# Patient Record
Sex: FEMALE | Race: WHITE | NOT HISPANIC OR LATINO | URBAN - METROPOLITAN AREA
[De-identification: names, ages, dates, MRNs, and addresses within clinical notes are randomized per-mention and may not be internally consistent; named-entity substitution may affect disease eponyms.]

---

## 2017-01-05 ENCOUNTER — ON CAMPUS - OUTPATIENT (AMBULATORY)
Dept: URBAN - METROPOLITAN AREA HOSPITAL 2 | Facility: HOSPITAL | Age: 62
End: 2017-01-05

## 2017-01-05 VITALS
DIASTOLIC BLOOD PRESSURE: 67 MMHG | HEART RATE: 104 BPM | DIASTOLIC BLOOD PRESSURE: 62 MMHG | HEART RATE: 100 BPM | HEART RATE: 109 BPM | OXYGEN SATURATION: 94 % | DIASTOLIC BLOOD PRESSURE: 65 MMHG | OXYGEN SATURATION: 96 % | WEIGHT: 189 LBS | RESPIRATION RATE: 17 BRPM | DIASTOLIC BLOOD PRESSURE: 90 MMHG | SYSTOLIC BLOOD PRESSURE: 146 MMHG | SYSTOLIC BLOOD PRESSURE: 121 MMHG | HEART RATE: 111 BPM | SYSTOLIC BLOOD PRESSURE: 135 MMHG | RESPIRATION RATE: 18 BRPM | HEART RATE: 102 BPM | SYSTOLIC BLOOD PRESSURE: 103 MMHG | HEIGHT: 63 IN | DIASTOLIC BLOOD PRESSURE: 74 MMHG | OXYGEN SATURATION: 93 % | SYSTOLIC BLOOD PRESSURE: 118 MMHG | TEMPERATURE: 96.9 F | DIASTOLIC BLOOD PRESSURE: 64 MMHG

## 2017-01-05 DIAGNOSIS — T18.2XXA FOREIGN BODY IN STOMACH, INITIAL ENCOUNTER: ICD-10-CM

## 2017-01-05 DIAGNOSIS — K92.1 MELENA: ICD-10-CM

## 2017-01-05 DIAGNOSIS — K44.9 DIAPHRAGMATIC HERNIA WITHOUT OBSTRUCTION OR GANGRENE: ICD-10-CM

## 2017-01-05 PROCEDURE — 43235 EGD DIAGNOSTIC BRUSH WASH: CPT

## 2017-01-05 RX ADMIN — PROPOFOL: 10 INJECTION, EMULSION INTRAVENOUS at 08:57

## 2017-03-01 ENCOUNTER — HOSPITAL ENCOUNTER (OUTPATIENT)
Dept: PAIN MEDICINE | Facility: HOSPITAL | Age: 62
Discharge: HOME | End: 2017-03-01
Attending: ANESTHESIOLOGY | Admitting: ANESTHESIOLOGY

## 2017-03-01 LAB
AMPHETAMINES UR QL SCN: NEGATIVE
BARBITURATES UR QL SCN: NEGATIVE
BENZODIAZ UR QL SCN: POSITIVE
BZE UR QL SCN: NEGATIVE
CREAT 24H UR-MCNC: ABNORMAL MG/DL
METHADONE UR QL SCN: NEGATIVE
OPIATES TESTED UR SCN: POSITIVE
PCP UR QL: NEGATIVE
THC SERPLBLD CFM-MCNC: NEGATIVE NG/ML

## 2017-03-02 ENCOUNTER — HOSPITAL ENCOUNTER (OUTPATIENT)
Dept: OTHER | Facility: HOSPITAL | Age: 62
Discharge: HOME | End: 2017-03-02
Attending: ANESTHESIOLOGY | Admitting: ANESTHESIOLOGY

## 2017-03-24 ENCOUNTER — HOSPITAL ENCOUNTER (OUTPATIENT)
Dept: PAIN MEDICINE | Facility: HOSPITAL | Age: 62
Discharge: HOME | End: 2017-03-24
Attending: ANESTHESIOLOGY | Admitting: ANESTHESIOLOGY

## 2017-04-03 ENCOUNTER — HOSPITAL ENCOUNTER (OUTPATIENT)
Dept: CARDIOLOGY | Facility: HOSPITAL | Age: 62
Discharge: HOME | End: 2017-04-03
Attending: NURSE PRACTITIONER | Admitting: NURSE PRACTITIONER

## 2017-05-10 ENCOUNTER — HOSPITAL ENCOUNTER (OUTPATIENT)
Dept: LAB | Facility: HOSPITAL | Age: 62
Setting detail: SPECIMEN
Discharge: HOME | End: 2017-05-10
Attending: NURSE PRACTITIONER | Admitting: NURSE PRACTITIONER

## 2017-05-10 LAB
ALBUMIN SERPL-MCNC: 3.7 G/DL (ref 3.5–4.8)
ALBUMIN/GLOB SERPL: 1.1 {RATIO} (ref 1–1.7)
ALP SERPL-CCNC: 94 IU/L (ref 32–91)
ALT SERPL-CCNC: 22 IU/L (ref 14–54)
ANION GAP SERPL CALC-SCNC: 14.9 MMOL/L (ref 10–20)
AST SERPL-CCNC: 22 IU/L (ref 15–41)
BILIRUB SERPL-MCNC: 0.3 MG/DL (ref 0.3–1.2)
BUN SERPL-MCNC: 30 MG/DL (ref 8–20)
BUN/CREAT SERPL: 25 (ref 5.4–26.2)
CALCIUM SERPL-MCNC: 9.7 MG/DL (ref 8.9–10.3)
CHLORIDE SERPL-SCNC: 99 MMOL/L (ref 101–111)
CHOLEST SERPL-MCNC: 260 MG/DL
CHOLEST/HDLC SERPL: 7.1 {RATIO}
CONV CO2: 31 MMOL/L (ref 22–32)
CONV LDL CHOLESTEROL DIRECT: 117 MG/DL (ref 0–100)
CONV TOTAL PROTEIN: 7 G/DL (ref 6.1–7.9)
CREAT UR-MCNC: 1.2 MG/DL (ref 0.4–1)
GLOBULIN UR ELPH-MCNC: 3.3 G/DL (ref 2.5–3.8)
GLUCOSE SERPL-MCNC: 97 MG/DL (ref 65–99)
HDLC SERPL-MCNC: 37 MG/DL
LDLC/HDLC SERPL: 3.2 {RATIO}
LIPID INTERPRETATION: ABNORMAL
POTASSIUM SERPL-SCNC: 4.9 MMOL/L (ref 3.6–5.1)
SODIUM SERPL-SCNC: 140 MMOL/L (ref 136–144)
TRIGL SERPL-MCNC: 516 MG/DL
VLDLC SERPL CALC-MCNC: 106.4 MG/DL

## 2017-05-18 ENCOUNTER — OFFICE (AMBULATORY)
Dept: URBAN - METROPOLITAN AREA CLINIC 64 | Facility: CLINIC | Age: 62
End: 2017-05-18

## 2017-05-18 VITALS
SYSTOLIC BLOOD PRESSURE: 110 MMHG | HEART RATE: 99 BPM | WEIGHT: 174 LBS | DIASTOLIC BLOOD PRESSURE: 67 MMHG | HEIGHT: 63 IN

## 2017-05-18 DIAGNOSIS — R13.10 DYSPHAGIA, UNSPECIFIED: ICD-10-CM

## 2017-05-18 DIAGNOSIS — R63.4 ABNORMAL WEIGHT LOSS: ICD-10-CM

## 2017-05-18 DIAGNOSIS — R11.0 NAUSEA: ICD-10-CM

## 2017-05-18 DIAGNOSIS — R10.11 RIGHT UPPER QUADRANT PAIN: ICD-10-CM

## 2017-05-18 DIAGNOSIS — K92.1 MELENA: ICD-10-CM

## 2017-05-18 LAB
AMYLASE, SERUM: 45 U/L (ref 31–124)
CBC WITH DIFFERENTIAL/PLATELET: BASO (ABSOLUTE): 0.1 X10E3/UL (ref 0–0.2)
CBC WITH DIFFERENTIAL/PLATELET: BASOS: 1 %
CBC WITH DIFFERENTIAL/PLATELET: EOS (ABSOLUTE): 0.4 X10E3/UL (ref 0–0.4)
CBC WITH DIFFERENTIAL/PLATELET: EOS: 4 %
CBC WITH DIFFERENTIAL/PLATELET: HEMATOCRIT: 41 % (ref 34–46.6)
CBC WITH DIFFERENTIAL/PLATELET: HEMATOLOGY COMMENTS: (no result)
CBC WITH DIFFERENTIAL/PLATELET: HEMOGLOBIN: 13.3 G/DL (ref 11.1–15.9)
CBC WITH DIFFERENTIAL/PLATELET: IMMATURE CELLS: (no result)
CBC WITH DIFFERENTIAL/PLATELET: IMMATURE GRANS (ABS): 0 X10E3/UL (ref 0–0.1)
CBC WITH DIFFERENTIAL/PLATELET: IMMATURE GRANULOCYTES: 0 %
CBC WITH DIFFERENTIAL/PLATELET: LYMPHS (ABSOLUTE): 3.1 X10E3/UL (ref 0.7–3.1)
CBC WITH DIFFERENTIAL/PLATELET: LYMPHS: 33 %
CBC WITH DIFFERENTIAL/PLATELET: MCH: 29.7 PG (ref 26.6–33)
CBC WITH DIFFERENTIAL/PLATELET: MCHC: 32.4 G/DL (ref 31.5–35.7)
CBC WITH DIFFERENTIAL/PLATELET: MCV: 92 FL (ref 79–97)
CBC WITH DIFFERENTIAL/PLATELET: MONOCYTES(ABSOLUTE): 0.8 X10E3/UL (ref 0.1–0.9)
CBC WITH DIFFERENTIAL/PLATELET: MONOCYTES: 8 %
CBC WITH DIFFERENTIAL/PLATELET: NEUTROPHILS (ABSOLUTE): 5.2 X10E3/UL (ref 1.4–7)
CBC WITH DIFFERENTIAL/PLATELET: NEUTROPHILS: 54 %
CBC WITH DIFFERENTIAL/PLATELET: NRBC: (no result)
CBC WITH DIFFERENTIAL/PLATELET: PLATELETS: 279 X10E3/UL (ref 150–379)
CBC WITH DIFFERENTIAL/PLATELET: RBC: 4.48 X10E6/UL (ref 3.77–5.28)
CBC WITH DIFFERENTIAL/PLATELET: RDW: 13.5 % (ref 12.3–15.4)
CBC WITH DIFFERENTIAL/PLATELET: WBC: 9.6 X10E3/UL (ref 3.4–10.8)
COMP. METABOLIC PANEL (14): A/G RATIO: 1.5 (ref 1.2–2.2)
COMP. METABOLIC PANEL (14): ALBUMIN, SERUM: 4.3 G/DL (ref 3.6–4.8)
COMP. METABOLIC PANEL (14): ALKALINE PHOSPHATASE, S: 124 IU/L — HIGH (ref 39–117)
COMP. METABOLIC PANEL (14): ALT (SGPT): 16 IU/L (ref 0–32)
COMP. METABOLIC PANEL (14): AST (SGOT): 15 IU/L (ref 0–40)
COMP. METABOLIC PANEL (14): BILIRUBIN, TOTAL: 0.2 MG/DL (ref 0–1.2)
COMP. METABOLIC PANEL (14): BUN/CREATININE RATIO: 25 (ref 12–28)
COMP. METABOLIC PANEL (14): BUN: 29 MG/DL — HIGH (ref 8–27)
COMP. METABOLIC PANEL (14): CALCIUM, SERUM: 10.5 MG/DL — HIGH (ref 8.7–10.3)
COMP. METABOLIC PANEL (14): CARBON DIOXIDE, TOTAL: 26 MMOL/L (ref 18–29)
COMP. METABOLIC PANEL (14): CHLORIDE, SERUM: 97 MMOL/L (ref 96–106)
COMP. METABOLIC PANEL (14): CREATININE, SERUM: 1.17 MG/DL — HIGH (ref 0.57–1)
COMP. METABOLIC PANEL (14): EGFR IF AFRICN AM: 59 ML/MIN/1.73 — LOW (ref 59–?)
COMP. METABOLIC PANEL (14): EGFR IF NONAFRICN AM: 51 ML/MIN/1.73 — LOW (ref 59–?)
COMP. METABOLIC PANEL (14): GLOBULIN, TOTAL: 2.9 G/DL (ref 1.5–4.5)
COMP. METABOLIC PANEL (14): GLUCOSE, SERUM: 205 MG/DL — HIGH (ref 65–99)
COMP. METABOLIC PANEL (14): POTASSIUM, SERUM: 5 MMOL/L (ref 3.5–5.2)
COMP. METABOLIC PANEL (14): PROTEIN, TOTAL, SERUM: 7.2 G/DL (ref 6–8.5)
COMP. METABOLIC PANEL (14): SODIUM, SERUM: 141 MMOL/L (ref 134–144)
LIPASE, SERUM: 36 U/L (ref 0–59)

## 2017-05-18 PROCEDURE — 99214 OFFICE O/P EST MOD 30 MIN: CPT | Performed by: NURSE PRACTITIONER

## 2017-05-19 ENCOUNTER — HOSPITAL ENCOUNTER (OUTPATIENT)
Dept: PAIN MEDICINE | Facility: HOSPITAL | Age: 62
Discharge: HOME | End: 2017-05-19
Attending: ANESTHESIOLOGY | Admitting: ANESTHESIOLOGY

## 2017-05-25 ENCOUNTER — ON CAMPUS - OUTPATIENT (AMBULATORY)
Dept: URBAN - METROPOLITAN AREA HOSPITAL 2 | Facility: HOSPITAL | Age: 62
End: 2017-05-25

## 2017-05-25 VITALS
OXYGEN SATURATION: 95 % | SYSTOLIC BLOOD PRESSURE: 139 MMHG | DIASTOLIC BLOOD PRESSURE: 72 MMHG | DIASTOLIC BLOOD PRESSURE: 74 MMHG | DIASTOLIC BLOOD PRESSURE: 71 MMHG | RESPIRATION RATE: 15 BRPM | WEIGHT: 173 LBS | HEART RATE: 85 BPM | HEART RATE: 102 BPM | SYSTOLIC BLOOD PRESSURE: 104 MMHG | HEART RATE: 88 BPM | TEMPERATURE: 98.3 F | RESPIRATION RATE: 16 BRPM | OXYGEN SATURATION: 100 % | HEIGHT: 63 IN | HEART RATE: 93 BPM | SYSTOLIC BLOOD PRESSURE: 118 MMHG | HEART RATE: 87 BPM | DIASTOLIC BLOOD PRESSURE: 76 MMHG | SYSTOLIC BLOOD PRESSURE: 135 MMHG

## 2017-05-25 DIAGNOSIS — R11.0 NAUSEA: ICD-10-CM

## 2017-05-25 DIAGNOSIS — R10.11 RIGHT UPPER QUADRANT PAIN: ICD-10-CM

## 2017-05-25 DIAGNOSIS — T18.128A FOOD IN ESOPHAGUS CAUSING OTHER INJURY, INITIAL ENCOUNTER: ICD-10-CM

## 2017-05-25 DIAGNOSIS — K44.9 DIAPHRAGMATIC HERNIA WITHOUT OBSTRUCTION OR GANGRENE: ICD-10-CM

## 2017-05-25 PROCEDURE — 43235 EGD DIAGNOSTIC BRUSH WASH: CPT

## 2017-05-25 RX ADMIN — PROPOFOL: 10 INJECTION, EMULSION INTRAVENOUS at 10:25

## 2017-06-02 ENCOUNTER — HOSPITAL ENCOUNTER (OUTPATIENT)
Dept: PAIN MEDICINE | Facility: HOSPITAL | Age: 62
Discharge: HOME | End: 2017-06-02
Attending: ANESTHESIOLOGY | Admitting: ANESTHESIOLOGY

## 2017-06-08 ENCOUNTER — OFFICE (AMBULATORY)
Dept: URBAN - METROPOLITAN AREA CLINIC 64 | Facility: CLINIC | Age: 62
End: 2017-06-08

## 2017-06-08 VITALS
SYSTOLIC BLOOD PRESSURE: 149 MMHG | DIASTOLIC BLOOD PRESSURE: 87 MMHG | HEIGHT: 63 IN | HEART RATE: 84 BPM | WEIGHT: 187 LBS

## 2017-06-08 DIAGNOSIS — K59.00 CONSTIPATION, UNSPECIFIED: ICD-10-CM

## 2017-06-08 PROCEDURE — 99214 OFFICE O/P EST MOD 30 MIN: CPT

## 2017-06-08 RX ORDER — LUBIPROSTONE 24 UG/1
CAPSULE, GELATIN COATED ORAL
Qty: 60 | Refills: 8 | Status: COMPLETED
End: 2017-06-08

## 2017-06-08 RX ORDER — PLECANATIDE 3 MG/1
3 TABLET ORAL
Qty: 30 | Refills: 11 | Status: COMPLETED
Start: 2017-06-08 | End: 2017-08-02

## 2017-06-23 ENCOUNTER — HOSPITAL ENCOUNTER (OUTPATIENT)
Dept: LAB | Facility: HOSPITAL | Age: 62
Discharge: HOME | End: 2017-06-23
Attending: INTERNAL MEDICINE | Admitting: INTERNAL MEDICINE

## 2017-06-23 LAB
BASOPHILS # BLD AUTO: 0.1 10*3/UL (ref 0–0.2)
BASOPHILS NFR BLD AUTO: 1 % (ref 0–2)
DIFFERENTIAL METHOD BLD: (no result)
EOSINOPHIL # BLD AUTO: 0.2 10*3/UL (ref 0–0.3)
EOSINOPHIL # BLD AUTO: 2 % (ref 0–3)
ERYTHROCYTE [DISTWIDTH] IN BLOOD BY AUTOMATED COUNT: 13.5 % (ref 11.5–14.5)
HCT VFR BLD AUTO: 38.8 % (ref 35–49)
HGB BLD-MCNC: 12.7 G/DL (ref 12–15)
LYMPHOCYTES # BLD AUTO: 3.8 10*3/UL (ref 0.8–4.8)
LYMPHOCYTES NFR BLD AUTO: 29 % (ref 18–42)
MCH RBC QN AUTO: 29.3 PG (ref 26–32)
MCHC RBC AUTO-ENTMCNC: 32.6 G/DL (ref 32–36)
MCV RBC AUTO: 89.8 FL (ref 80–94)
MONOCYTES # BLD AUTO: 0.8 10*3/UL (ref 0.1–1.3)
MONOCYTES NFR BLD AUTO: 6 % (ref 2–11)
NEUTROPHILS # BLD AUTO: 8.3 10*3/UL (ref 2.3–8.6)
NEUTROPHILS NFR BLD AUTO: 62 % (ref 50–75)
NRBC BLD AUTO-RTO: 0 /100{WBCS}
NRBC/RBC NFR BLD MANUAL: 0 10*3/UL
PLATELET # BLD AUTO: 267 10*3/UL (ref 150–450)
PMV BLD AUTO: 9.9 FL (ref 7.4–10.4)
RBC # BLD AUTO: 4.32 10*6/UL (ref 4–5.4)
WBC # BLD AUTO: 13.3 10*3/UL (ref 4.5–11.5)

## 2017-06-24 ENCOUNTER — HOSPITAL ENCOUNTER (OUTPATIENT)
Dept: LAB | Facility: HOSPITAL | Age: 62
Setting detail: SPECIMEN
Discharge: HOME | End: 2017-06-24
Attending: INTERNAL MEDICINE | Admitting: INTERNAL MEDICINE

## 2017-06-24 LAB
HEMOCCULT STL QL IA: NEGATIVE

## 2017-07-05 ENCOUNTER — HOSPITAL ENCOUNTER (OUTPATIENT)
Dept: PAIN MEDICINE | Facility: HOSPITAL | Age: 62
Discharge: HOME | End: 2017-07-05
Attending: ANESTHESIOLOGY | Admitting: ANESTHESIOLOGY

## 2017-07-05 LAB
AMPHETAMINES UR QL SCN: NEGATIVE
BZE UR QL SCN: NEGATIVE
CREAT 24H UR-MCNC: NORMAL MG/DL
METHADONE UR QL SCN: NEGATIVE
OPIATE CONFIRMATION URINE: NORMAL
THC SERPLBLD CFM-MCNC: NEGATIVE NG/ML

## 2017-08-02 ENCOUNTER — OFFICE (AMBULATORY)
Dept: URBAN - METROPOLITAN AREA CLINIC 64 | Facility: CLINIC | Age: 62
End: 2017-08-02

## 2017-08-02 VITALS
DIASTOLIC BLOOD PRESSURE: 69 MMHG | HEIGHT: 63 IN | WEIGHT: 193 LBS | SYSTOLIC BLOOD PRESSURE: 115 MMHG | HEART RATE: 92 BPM

## 2017-08-02 DIAGNOSIS — R10.11 RIGHT UPPER QUADRANT PAIN: ICD-10-CM

## 2017-08-02 DIAGNOSIS — R11.0 NAUSEA: ICD-10-CM

## 2017-08-02 DIAGNOSIS — K59.00 CONSTIPATION, UNSPECIFIED: ICD-10-CM

## 2017-08-02 LAB
CBC WITH DIFFERENTIAL/PLATELET: BASO (ABSOLUTE): 0.1 X10E3/UL (ref 0–0.2)
CBC WITH DIFFERENTIAL/PLATELET: BASOS: 1 %
CBC WITH DIFFERENTIAL/PLATELET: EOS (ABSOLUTE): 0.3 X10E3/UL (ref 0–0.4)
CBC WITH DIFFERENTIAL/PLATELET: EOS: 2 %
CBC WITH DIFFERENTIAL/PLATELET: HEMATOCRIT: 36.3 % (ref 34–46.6)
CBC WITH DIFFERENTIAL/PLATELET: HEMATOLOGY COMMENTS: (no result)
CBC WITH DIFFERENTIAL/PLATELET: HEMOGLOBIN: 12.1 G/DL (ref 11.1–15.9)
CBC WITH DIFFERENTIAL/PLATELET: IMMATURE CELLS: (no result)
CBC WITH DIFFERENTIAL/PLATELET: IMMATURE GRANS (ABS): 0.1 X10E3/UL (ref 0–0.1)
CBC WITH DIFFERENTIAL/PLATELET: IMMATURE GRANULOCYTES: 1 %
CBC WITH DIFFERENTIAL/PLATELET: LYMPHS (ABSOLUTE): 3.5 X10E3/UL — HIGH (ref 0.7–3.1)
CBC WITH DIFFERENTIAL/PLATELET: LYMPHS: 31 %
CBC WITH DIFFERENTIAL/PLATELET: MCH: 29.4 PG (ref 26.6–33)
CBC WITH DIFFERENTIAL/PLATELET: MCHC: 33.3 G/DL (ref 31.5–35.7)
CBC WITH DIFFERENTIAL/PLATELET: MCV: 88 FL (ref 79–97)
CBC WITH DIFFERENTIAL/PLATELET: MONOCYTES(ABSOLUTE): 0.9 X10E3/UL (ref 0.1–0.9)
CBC WITH DIFFERENTIAL/PLATELET: MONOCYTES: 8 %
CBC WITH DIFFERENTIAL/PLATELET: NEUTROPHILS (ABSOLUTE): 6.4 X10E3/UL (ref 1.4–7)
CBC WITH DIFFERENTIAL/PLATELET: NEUTROPHILS: 57 %
CBC WITH DIFFERENTIAL/PLATELET: NRBC: (no result)
CBC WITH DIFFERENTIAL/PLATELET: PLATELETS: 280 X10E3/UL (ref 150–379)
CBC WITH DIFFERENTIAL/PLATELET: RBC: 4.12 X10E6/UL (ref 3.77–5.28)
CBC WITH DIFFERENTIAL/PLATELET: RDW: 13.8 % (ref 12.3–15.4)
CBC WITH DIFFERENTIAL/PLATELET: WBC: 11.1 X10E3/UL — HIGH (ref 3.4–10.8)

## 2017-08-02 PROCEDURE — 99213 OFFICE O/P EST LOW 20 MIN: CPT | Performed by: NURSE PRACTITIONER

## 2017-08-02 RX ORDER — PLECANATIDE 3 MG/1
3 TABLET ORAL
Qty: 30 | Refills: 11 | Status: ACTIVE
Start: 2017-08-02

## 2017-09-14 ENCOUNTER — HOSPITAL ENCOUNTER (OUTPATIENT)
Dept: LAB | Facility: HOSPITAL | Age: 62
Setting detail: SPECIMEN
Discharge: HOME | End: 2017-09-14
Attending: INTERNAL MEDICINE | Admitting: INTERNAL MEDICINE

## 2017-10-18 ENCOUNTER — HOSPITAL ENCOUNTER (OUTPATIENT)
Dept: PAIN MEDICINE | Facility: HOSPITAL | Age: 62
Discharge: HOME | End: 2017-10-18
Attending: ANESTHESIOLOGY | Admitting: ANESTHESIOLOGY

## 2017-10-21 LAB
ALPRAZ SERPL-MCNC: NORMAL NG/ML
AMOBARBITAL SERPL-MCNC: NORMAL UG/ML
AMPHETAMINES SERPL QL SCN: NEGATIVE
AMPHETAMINES SPEC QL: NORMAL
AMPHETAMINES UR QL: NORMAL
BARBITURATES SERPLBLD QL: NEGATIVE
BENZODIAZ SERPL SCN-MCNC: NEGATIVE NG/ML
BUTABARBITAL SERPL-MCNC: NORMAL UG/ML
BUTALBITAL SERPL-MCNC: NORMAL UG/ML
BZE SERPL-MCNC: NORMAL NG/ML
BZE UR QL: NEGATIVE
CANNABINOIDS SERPL-MCNC: NEGATIVE NG/ML
CARBOXYTHC SPEC-MCNC: NORMAL NG/ML
COCAETHYLENE SERPL-MCNC: NORMAL MG/ML
COCAINE SPEC-MCNC: NORMAL NG/ML
CODEINE UR QL: NORMAL
DESALKYLFLURAZ BLD CFM-MCNC: NORMAL NG/ML
EME SERPL-MCNC: NORMAL NG/ML
HYDROCODONE SERPL-MCNC: NORMAL NG/ML
HYDROMORPHONE SERPL-MCNC: NORMAL NG/ML
LORAZEPAM SPEC-MCNC: NORMAL NG/ML
MDA SERPLBLD-MCNC: NORMAL NG/ML
MDMA SERPLBLD-MCNC: NORMAL NG/ML
METHADONE BLD QL SCN: NEGATIVE
METHADONE SPEC QL: NORMAL
MORPHINE SERPLBLD-MCNC: NORMAL NG/ML
NORDIAZEPAM SERPL-MCNC: NORMAL NG/ML
NORPROPOXYPH SPEC-MCNC: NORMAL UG/ML
OPIATES UR QL SCN: NEGATIVE
OXAZEPAM SPEC-MCNC: NORMAL UG/ML
OXYCODONE SERPL-MCNC: NORMAL NG/ML
PCP SPEC QL: NORMAL
PCP SPEC-MCNC: NEGATIVE NG/ML
PENTOBARB SERPL-MCNC: NORMAL UG/ML
PHENOBARB SERPL-MCNC: NORMAL UG/ML
PROPOXYPHENE SCREEN, SER/BLD: NEGATIVE
SECOBARBITAL UR QL: NORMAL
THC CONFIRM: NORMAL

## 2017-12-07 ENCOUNTER — ON CAMPUS - OUTPATIENT (AMBULATORY)
Dept: URBAN - METROPOLITAN AREA HOSPITAL 2 | Facility: HOSPITAL | Age: 62
End: 2017-12-07

## 2017-12-07 VITALS
HEART RATE: 90 BPM | OXYGEN SATURATION: 96 % | RESPIRATION RATE: 18 BRPM | DIASTOLIC BLOOD PRESSURE: 65 MMHG | HEART RATE: 85 BPM | OXYGEN SATURATION: 97 % | DIASTOLIC BLOOD PRESSURE: 67 MMHG | SYSTOLIC BLOOD PRESSURE: 130 MMHG | DIASTOLIC BLOOD PRESSURE: 71 MMHG | HEART RATE: 87 BPM | HEART RATE: 78 BPM | HEIGHT: 63 IN | SYSTOLIC BLOOD PRESSURE: 125 MMHG | SYSTOLIC BLOOD PRESSURE: 116 MMHG | HEART RATE: 80 BPM | OXYGEN SATURATION: 95 % | WEIGHT: 193 LBS | SYSTOLIC BLOOD PRESSURE: 101 MMHG | OXYGEN SATURATION: 98 % | OXYGEN SATURATION: 94 % | TEMPERATURE: 96.8 F | SYSTOLIC BLOOD PRESSURE: 113 MMHG | RESPIRATION RATE: 16 BRPM | DIASTOLIC BLOOD PRESSURE: 59 MMHG

## 2017-12-07 DIAGNOSIS — R10.13 EPIGASTRIC PAIN: ICD-10-CM

## 2017-12-07 PROCEDURE — 43450 DILATE ESOPHAGUS 1/MULT PASS: CPT

## 2017-12-07 PROCEDURE — 43235 EGD DIAGNOSTIC BRUSH WASH: CPT

## 2017-12-07 RX ADMIN — PROPOFOL: 10 INJECTION, EMULSION INTRAVENOUS at 09:24

## 2017-12-20 ENCOUNTER — HOSPITAL ENCOUNTER (OUTPATIENT)
Dept: CARDIOLOGY | Facility: HOSPITAL | Age: 62
Discharge: HOME | End: 2017-12-20
Attending: INTERNAL MEDICINE | Admitting: INTERNAL MEDICINE

## 2018-01-24 ENCOUNTER — OFFICE (AMBULATORY)
Dept: URBAN - METROPOLITAN AREA CLINIC 64 | Facility: CLINIC | Age: 63
End: 2018-01-24

## 2018-01-24 VITALS
HEIGHT: 63 IN | WEIGHT: 195 LBS | SYSTOLIC BLOOD PRESSURE: 118 MMHG | DIASTOLIC BLOOD PRESSURE: 67 MMHG | HEART RATE: 82 BPM

## 2018-01-24 DIAGNOSIS — R13.10 DYSPHAGIA, UNSPECIFIED: ICD-10-CM

## 2018-01-24 DIAGNOSIS — R14.0 ABDOMINAL DISTENSION (GASEOUS): ICD-10-CM

## 2018-01-24 DIAGNOSIS — R10.32 LEFT LOWER QUADRANT PAIN: ICD-10-CM

## 2018-01-24 DIAGNOSIS — Z72.0 TOBACCO USE: ICD-10-CM

## 2018-01-24 DIAGNOSIS — R19.5 OTHER FECAL ABNORMALITIES: ICD-10-CM

## 2018-01-24 DIAGNOSIS — K59.1 FUNCTIONAL DIARRHEA: ICD-10-CM

## 2018-01-24 DIAGNOSIS — Z86.010 PERSONAL HISTORY OF COLONIC POLYPS: ICD-10-CM

## 2018-01-24 PROCEDURE — 99214 OFFICE O/P EST MOD 30 MIN: CPT | Performed by: NURSE PRACTITIONER

## 2018-01-24 RX ORDER — METRONIDAZOLE 500 MG/1
1500 TABLET, FILM COATED ORAL
Qty: 42 | Refills: 0 | Status: COMPLETED
Start: 2018-01-24 | End: 2018-03-08

## 2018-02-23 ENCOUNTER — HOSPITAL ENCOUNTER (OUTPATIENT)
Dept: PAIN MEDICINE | Facility: HOSPITAL | Age: 63
Discharge: HOME | End: 2018-02-23
Attending: ANESTHESIOLOGY | Admitting: ANESTHESIOLOGY

## 2018-03-08 ENCOUNTER — OFFICE (AMBULATORY)
Dept: URBAN - METROPOLITAN AREA CLINIC 64 | Facility: CLINIC | Age: 63
End: 2018-03-08

## 2018-03-08 VITALS
SYSTOLIC BLOOD PRESSURE: 141 MMHG | DIASTOLIC BLOOD PRESSURE: 84 MMHG | HEIGHT: 63 IN | WEIGHT: 199 LBS | HEART RATE: 79 BPM

## 2018-03-08 DIAGNOSIS — K59.00 CONSTIPATION, UNSPECIFIED: ICD-10-CM

## 2018-03-08 DIAGNOSIS — R14.0 ABDOMINAL DISTENSION (GASEOUS): ICD-10-CM

## 2018-03-08 PROCEDURE — 99213 OFFICE O/P EST LOW 20 MIN: CPT

## 2018-03-08 RX ORDER — NALOXEGOL OXALATE 25 MG/1
TABLET, FILM COATED ORAL
Qty: 30 | Status: COMPLETED
End: 2018-03-08

## 2018-03-08 RX ORDER — PLECANATIDE 3 MG/1
3 TABLET ORAL
Qty: 30 | Refills: 11 | Status: COMPLETED
Start: 2018-03-08 | End: 2018-05-29

## 2018-04-06 ENCOUNTER — HOSPITAL ENCOUNTER (OUTPATIENT)
Dept: PAIN MEDICINE | Facility: HOSPITAL | Age: 63
Discharge: HOME | End: 2018-04-06
Attending: ANESTHESIOLOGY | Admitting: ANESTHESIOLOGY

## 2018-04-20 ENCOUNTER — HOSPITAL ENCOUNTER (OUTPATIENT)
Dept: PAIN MEDICINE | Facility: HOSPITAL | Age: 63
Discharge: HOME | End: 2018-04-20
Attending: ANESTHESIOLOGY | Admitting: ANESTHESIOLOGY

## 2018-05-23 ENCOUNTER — HOSPITAL ENCOUNTER (OUTPATIENT)
Dept: PAIN MEDICINE | Facility: HOSPITAL | Age: 63
Discharge: HOME | End: 2018-05-23
Attending: ANESTHESIOLOGY | Admitting: ANESTHESIOLOGY

## 2018-05-29 ENCOUNTER — OFFICE (AMBULATORY)
Dept: URBAN - METROPOLITAN AREA CLINIC 64 | Facility: CLINIC | Age: 63
End: 2018-05-29

## 2018-05-29 VITALS
SYSTOLIC BLOOD PRESSURE: 122 MMHG | DIASTOLIC BLOOD PRESSURE: 73 MMHG | HEIGHT: 63 IN | HEART RATE: 103 BPM | WEIGHT: 195 LBS

## 2018-05-29 DIAGNOSIS — R11.0 NAUSEA: ICD-10-CM

## 2018-05-29 DIAGNOSIS — K59.00 CONSTIPATION, UNSPECIFIED: ICD-10-CM

## 2018-05-29 DIAGNOSIS — R10.11 RIGHT UPPER QUADRANT PAIN: ICD-10-CM

## 2018-05-29 DIAGNOSIS — R14.0 ABDOMINAL DISTENSION (GASEOUS): ICD-10-CM

## 2018-05-29 DIAGNOSIS — K21.9 GASTRO-ESOPHAGEAL REFLUX DISEASE WITHOUT ESOPHAGITIS: ICD-10-CM

## 2018-05-29 DIAGNOSIS — R13.19 OTHER DYSPHAGIA: ICD-10-CM

## 2018-05-29 PROCEDURE — 99214 OFFICE O/P EST MOD 30 MIN: CPT | Performed by: NURSE PRACTITIONER

## 2018-05-29 RX ORDER — PLECANATIDE 3 MG/1
3 TABLET ORAL
Qty: 30 | Refills: 11 | Status: COMPLETED
Start: 2018-03-08 | End: 2018-05-29

## 2018-05-29 RX ORDER — SORBITOL SOLUTION 70 %
SOLUTION, ORAL MISCELLANEOUS
Qty: 100 | Refills: 0 | Status: COMPLETED
Start: 2018-05-29 | End: 2018-07-18

## 2018-06-06 ENCOUNTER — HOSPITAL ENCOUNTER (OUTPATIENT)
Dept: ULTRASOUND IMAGING | Facility: HOSPITAL | Age: 63
Discharge: HOME | End: 2018-06-06
Attending: NURSE PRACTITIONER | Admitting: NURSE PRACTITIONER

## 2018-06-27 ENCOUNTER — HOSPITAL ENCOUNTER (OUTPATIENT)
Dept: PAIN MEDICINE | Facility: HOSPITAL | Age: 63
Discharge: HOME | End: 2018-06-27
Attending: ANESTHESIOLOGY | Admitting: ANESTHESIOLOGY

## 2018-06-29 ENCOUNTER — ON CAMPUS - OUTPATIENT (AMBULATORY)
Dept: URBAN - METROPOLITAN AREA HOSPITAL 85 | Facility: HOSPITAL | Age: 63
End: 2018-06-29

## 2018-06-29 ENCOUNTER — HOSPITAL ENCOUNTER (OUTPATIENT)
Dept: PREOP | Facility: HOSPITAL | Age: 63
Setting detail: HOSPITAL OUTPATIENT SURGERY
Discharge: HOME | End: 2018-06-29
Attending: INTERNAL MEDICINE | Admitting: INTERNAL MEDICINE

## 2018-06-29 DIAGNOSIS — R13.10 DYSPHAGIA, UNSPECIFIED: ICD-10-CM

## 2018-06-29 DIAGNOSIS — K59.00 CONSTIPATION, UNSPECIFIED: ICD-10-CM

## 2018-06-29 DIAGNOSIS — K64.8 OTHER HEMORRHOIDS: ICD-10-CM

## 2018-06-29 DIAGNOSIS — K62.5 HEMORRHAGE OF ANUS AND RECTUM: ICD-10-CM

## 2018-06-29 DIAGNOSIS — R19.4 CHANGE IN BOWEL HABIT: ICD-10-CM

## 2018-06-29 DIAGNOSIS — D12.2 BENIGN NEOPLASM OF ASCENDING COLON: ICD-10-CM

## 2018-06-29 LAB
GLUCOSE BLD-MCNC: 133 MG/DL (ref 70–105)
GLUCOSE BLD-MCNC: 134 MG/DL (ref 70–105)
GLUCOSE BLD-MCNC: 135 MG/DL (ref 70–105)

## 2018-06-29 PROCEDURE — 43450 DILATE ESOPHAGUS 1/MULT PASS: CPT | Performed by: INTERNAL MEDICINE

## 2018-06-29 PROCEDURE — 43235 EGD DIAGNOSTIC BRUSH WASH: CPT | Performed by: INTERNAL MEDICINE

## 2018-06-29 PROCEDURE — 45380 COLONOSCOPY AND BIOPSY: CPT | Performed by: INTERNAL MEDICINE

## 2018-07-11 ENCOUNTER — HOSPITAL ENCOUNTER (OUTPATIENT)
Dept: PAIN MEDICINE | Facility: HOSPITAL | Age: 63
Discharge: HOME | End: 2018-07-11
Attending: ANESTHESIOLOGY | Admitting: ANESTHESIOLOGY

## 2018-07-18 ENCOUNTER — OFFICE (AMBULATORY)
Dept: URBAN - METROPOLITAN AREA CLINIC 64 | Facility: CLINIC | Age: 63
End: 2018-07-18

## 2018-07-18 VITALS
HEIGHT: 63 IN | SYSTOLIC BLOOD PRESSURE: 123 MMHG | WEIGHT: 200 LBS | HEART RATE: 91 BPM | DIASTOLIC BLOOD PRESSURE: 74 MMHG

## 2018-07-18 DIAGNOSIS — R13.10 DYSPHAGIA, UNSPECIFIED: ICD-10-CM

## 2018-07-18 DIAGNOSIS — K59.00 CONSTIPATION, UNSPECIFIED: ICD-10-CM

## 2018-07-18 PROCEDURE — 99214 OFFICE O/P EST MOD 30 MIN: CPT | Performed by: INTERNAL MEDICINE

## 2018-07-18 RX ORDER — METHYLNALTREXONE BROMIDE 150 MG/1
450 TABLET ORAL
Qty: 90 | Refills: 11 | Status: COMPLETED
Start: 2018-07-18 | End: 2018-10-22

## 2018-07-18 NOTE — SERVICEHPINOTES
Patient is a 63-year-old  female with a history of chronic pain on narcotics, fibromyalgia, diabetes, COPD with current tobacco use, asthma, anxiety, depression, GERD, seizures, constipation, and colon polyps who presents to clinic today with complaint of constipation. She had recent egd/colonoscopy with results below. She has tried numerous meds for constipation, most recently movantik which quit working. She goes 1 week between bms. She also notes trouble swallowing mainly coughing after drinking. She denies recent pna.BRJune 2018 egd/colon nl esophagus dilated 54F, g1 hemorrhoids, [id number]/7/17 EGD with Dr. Valentin showed normal esophagus status post dilation to 52 FrenchBRMay 2017 EGD by Dr. Valentin showed hiatal hernia, food in body of stomach, normal stomach and duodenumBRJanuary 2014 colonoscopy by Dr. Valentin showed normal colonoscopyBRNovember 2013 colonoscopy by Dr. Valentin with poor prepBR4/2012 M2A - normal capsule studyBRApril 2011 colonoscopy by Dr. Valentin showed polyps (path showing leiomyoma of muscularis mocosa)

## 2018-09-11 ENCOUNTER — HOSPITAL ENCOUNTER (OUTPATIENT)
Dept: LAB | Facility: HOSPITAL | Age: 63
Discharge: HOME | End: 2018-09-11
Attending: ANESTHESIOLOGY | Admitting: ANESTHESIOLOGY

## 2018-09-11 LAB
AMPHETAMINES UR QL SCN: NEGATIVE
BARBITURATES UR QL SCN: NEGATIVE
BENZODIAZ UR QL SCN: NEGATIVE
BZE UR QL SCN: NEGATIVE
CREAT 24H UR-MCNC: NORMAL MG/DL
METHADONE UR QL SCN: NEGATIVE
OPIATE CONFIRMATION URINE: NORMAL
OPIATES TESTED UR SCN: NEGATIVE
PCP UR QL: NEGATIVE
THC SERPLBLD CFM-MCNC: NEGATIVE NG/ML

## 2018-09-20 ENCOUNTER — OFFICE (AMBULATORY)
Dept: URBAN - METROPOLITAN AREA CLINIC 64 | Facility: CLINIC | Age: 63
End: 2018-09-20

## 2018-09-20 VITALS
HEART RATE: 85 BPM | SYSTOLIC BLOOD PRESSURE: 131 MMHG | WEIGHT: 187 LBS | HEIGHT: 63 IN | DIASTOLIC BLOOD PRESSURE: 82 MMHG

## 2018-09-20 DIAGNOSIS — R11.0 NAUSEA: ICD-10-CM

## 2018-09-20 DIAGNOSIS — R14.0 ABDOMINAL DISTENSION (GASEOUS): ICD-10-CM

## 2018-09-20 DIAGNOSIS — K62.89 OTHER SPECIFIED DISEASES OF ANUS AND RECTUM: ICD-10-CM

## 2018-09-20 DIAGNOSIS — K59.00 CONSTIPATION, UNSPECIFIED: ICD-10-CM

## 2018-09-20 DIAGNOSIS — R13.10 DYSPHAGIA, UNSPECIFIED: ICD-10-CM

## 2018-09-20 DIAGNOSIS — R19.5 OTHER FECAL ABNORMALITIES: ICD-10-CM

## 2018-09-20 PROCEDURE — 99213 OFFICE O/P EST LOW 20 MIN: CPT | Performed by: NURSE PRACTITIONER

## 2018-09-20 RX ORDER — METHYLNALTREXONE BROMIDE 150 MG/1
450 TABLET ORAL
Qty: 90 | Refills: 11 | Status: COMPLETED
Start: 2018-09-20 | End: 2018-10-22

## 2018-10-03 ENCOUNTER — HOSPITAL ENCOUNTER (OUTPATIENT)
Dept: LAB | Facility: HOSPITAL | Age: 63
Setting detail: SPECIMEN
Discharge: HOME | End: 2018-10-03
Attending: INTERNAL MEDICINE | Admitting: INTERNAL MEDICINE

## 2018-10-03 LAB
ALBUMIN SERPL-MCNC: 3.6 G/DL (ref 3.5–4.8)
ALBUMIN/GLOB SERPL: 1 {RATIO} (ref 1–1.7)
ALP SERPL-CCNC: 131 IU/L (ref 32–91)
ALT SERPL-CCNC: 21 IU/L (ref 14–54)
ANION GAP SERPL CALC-SCNC: 14.7 MMOL/L (ref 10–20)
AST SERPL-CCNC: 25 IU/L (ref 15–41)
BILIRUB SERPL-MCNC: 0.4 MG/DL (ref 0.3–1.2)
BUN SERPL-MCNC: 22 MG/DL (ref 8–20)
BUN/CREAT SERPL: 20 (ref 5.4–26.2)
CALCIUM SERPL-MCNC: 9.6 MG/DL (ref 8.9–10.3)
CHLORIDE SERPL-SCNC: 99 MMOL/L (ref 101–111)
CHOLEST SERPL-MCNC: 183 MG/DL
CHOLEST/HDLC SERPL: 4.6 {RATIO}
CONV CO2: 28 MMOL/L (ref 22–32)
CONV LDL CHOLESTEROL DIRECT: 101 MG/DL (ref 0–100)
CONV TOTAL PROTEIN: 7.1 G/DL (ref 6.1–7.9)
CREAT UR-MCNC: 1.1 MG/DL (ref 0.4–1)
GLOBULIN UR ELPH-MCNC: 3.5 G/DL (ref 2.5–3.8)
GLUCOSE SERPL-MCNC: 114 MG/DL (ref 65–99)
HDLC SERPL-MCNC: 40 MG/DL
LDLC/HDLC SERPL: 2.5 {RATIO}
LIPID INTERPRETATION: ABNORMAL
POTASSIUM SERPL-SCNC: 4.7 MMOL/L (ref 3.6–5.1)
SODIUM SERPL-SCNC: 137 MMOL/L (ref 136–144)
TRIGL SERPL-MCNC: 277 MG/DL
VLDLC SERPL CALC-MCNC: 41.4 MG/DL

## 2018-10-04 LAB — HBA1C MFR BLD: 7.3 % (ref 0–5.6)

## 2018-10-05 ENCOUNTER — HOSPITAL ENCOUNTER (OUTPATIENT)
Dept: PAIN MEDICINE | Facility: HOSPITAL | Age: 63
Discharge: HOME | End: 2018-10-05
Attending: ANESTHESIOLOGY | Admitting: ANESTHESIOLOGY

## 2018-10-22 ENCOUNTER — OFFICE (AMBULATORY)
Dept: URBAN - METROPOLITAN AREA CLINIC 64 | Facility: CLINIC | Age: 63
End: 2018-10-22

## 2018-10-22 VITALS
HEART RATE: 81 BPM | HEIGHT: 63 IN | WEIGHT: 197 LBS | SYSTOLIC BLOOD PRESSURE: 102 MMHG | DIASTOLIC BLOOD PRESSURE: 59 MMHG

## 2018-10-22 DIAGNOSIS — K59.00 CONSTIPATION, UNSPECIFIED: ICD-10-CM

## 2018-10-22 DIAGNOSIS — R19.5 OTHER FECAL ABNORMALITIES: ICD-10-CM

## 2018-10-22 DIAGNOSIS — R10.32 LEFT LOWER QUADRANT PAIN: ICD-10-CM

## 2018-10-22 PROCEDURE — 99214 OFFICE O/P EST MOD 30 MIN: CPT | Performed by: NURSE PRACTITIONER

## 2018-10-22 RX ORDER — METHYLNALTREXONE BROMIDE 150 MG/1
TABLET ORAL
Qty: 90 | Refills: 3 | Status: COMPLETED
Start: 2018-10-22 | End: 2019-08-19

## 2018-10-22 RX ORDER — SORBITOL SOLUTION 70 %
SOLUTION, ORAL MISCELLANEOUS
Qty: 100 | Refills: 0 | Status: COMPLETED
Start: 2018-10-22 | End: 2019-02-08

## 2018-10-22 RX ORDER — LACTULOSE 10 G/15ML
600 SOLUTION ORAL
Qty: 1800 | Refills: 11 | Status: COMPLETED
Start: 2018-10-22 | End: 2019-02-08

## 2018-11-26 ENCOUNTER — HOSPITAL ENCOUNTER (OUTPATIENT)
Dept: GENERAL RADIOLOGY | Facility: HOSPITAL | Age: 63
Discharge: HOME | End: 2018-11-26
Attending: NURSE PRACTITIONER | Admitting: NURSE PRACTITIONER

## 2018-11-28 ENCOUNTER — HOSPITAL ENCOUNTER (OUTPATIENT)
Dept: PAIN MEDICINE | Facility: HOSPITAL | Age: 63
Discharge: HOME | End: 2018-11-28
Attending: ANESTHESIOLOGY | Admitting: ANESTHESIOLOGY

## 2018-11-29 LAB — GLUCOSE BLD-MCNC: 200 MG/DL (ref 70–105)

## 2018-12-12 ENCOUNTER — HOSPITAL ENCOUNTER (OUTPATIENT)
Dept: PAIN MEDICINE | Facility: HOSPITAL | Age: 63
Discharge: HOME | End: 2018-12-12
Attending: ANESTHESIOLOGY | Admitting: ANESTHESIOLOGY

## 2018-12-12 ENCOUNTER — OFFICE (AMBULATORY)
Dept: URBAN - METROPOLITAN AREA CLINIC 64 | Facility: CLINIC | Age: 63
End: 2018-12-12

## 2018-12-12 VITALS
SYSTOLIC BLOOD PRESSURE: 125 MMHG | HEIGHT: 63 IN | DIASTOLIC BLOOD PRESSURE: 68 MMHG | WEIGHT: 197 LBS | HEART RATE: 82 BPM

## 2018-12-12 DIAGNOSIS — K59.00 CONSTIPATION, UNSPECIFIED: ICD-10-CM

## 2018-12-12 DIAGNOSIS — R13.10 DYSPHAGIA, UNSPECIFIED: ICD-10-CM

## 2018-12-12 DIAGNOSIS — R11.0 NAUSEA: ICD-10-CM

## 2018-12-12 DIAGNOSIS — K21.9 GASTRO-ESOPHAGEAL REFLUX DISEASE WITHOUT ESOPHAGITIS: ICD-10-CM

## 2018-12-12 PROCEDURE — 99214 OFFICE O/P EST MOD 30 MIN: CPT | Performed by: NURSE PRACTITIONER

## 2018-12-12 RX ORDER — PANTOPRAZOLE SODIUM 40 MG/1
80 TABLET, DELAYED RELEASE ORAL
Qty: 180 | Refills: 3 | Status: COMPLETED
Start: 2018-12-12 | End: 2019-09-03

## 2018-12-12 RX ORDER — ONDANSETRON 4 MG/1
12 TABLET, ORALLY DISINTEGRATING ORAL
Qty: 30 | Refills: 4 | Status: COMPLETED
Start: 2018-12-12 | End: 2019-09-03

## 2019-01-17 ENCOUNTER — HOSPITAL ENCOUNTER (OUTPATIENT)
Dept: GASTROENTEROLOGY | Facility: HOSPITAL | Age: 64
Setting detail: HOSPITAL OUTPATIENT SURGERY
Discharge: HOME | End: 2019-01-17
Attending: INTERNAL MEDICINE | Admitting: INTERNAL MEDICINE

## 2019-01-17 ENCOUNTER — ON CAMPUS - OUTPATIENT (AMBULATORY)
Dept: URBAN - METROPOLITAN AREA HOSPITAL 85 | Facility: HOSPITAL | Age: 64
End: 2019-01-17

## 2019-01-17 DIAGNOSIS — K29.70 GASTRITIS, UNSPECIFIED, WITHOUT BLEEDING: ICD-10-CM

## 2019-01-17 DIAGNOSIS — K22.4 DYSKINESIA OF ESOPHAGUS: ICD-10-CM

## 2019-01-17 DIAGNOSIS — R13.10 DYSPHAGIA, UNSPECIFIED: ICD-10-CM

## 2019-01-17 LAB
GLUCOSE BLD-MCNC: 202 MG/DL (ref 70–105)
GLUCOSE BLD-MCNC: 214 MG/DL (ref 70–105)

## 2019-01-17 PROCEDURE — 43450 DILATE ESOPHAGUS 1/MULT PASS: CPT | Performed by: INTERNAL MEDICINE

## 2019-01-17 PROCEDURE — 43239 EGD BIOPSY SINGLE/MULTIPLE: CPT | Performed by: INTERNAL MEDICINE

## 2019-02-08 ENCOUNTER — OFFICE (AMBULATORY)
Dept: URBAN - METROPOLITAN AREA CLINIC 64 | Facility: CLINIC | Age: 64
End: 2019-02-08

## 2019-02-08 VITALS
HEIGHT: 63 IN | DIASTOLIC BLOOD PRESSURE: 66 MMHG | HEART RATE: 82 BPM | WEIGHT: 201 LBS | SYSTOLIC BLOOD PRESSURE: 113 MMHG

## 2019-02-08 DIAGNOSIS — K59.00 CONSTIPATION, UNSPECIFIED: ICD-10-CM

## 2019-02-08 PROCEDURE — 99213 OFFICE O/P EST LOW 20 MIN: CPT | Performed by: NURSE PRACTITIONER

## 2019-02-08 RX ORDER — ONDANSETRON 4 MG/1
12 TABLET, ORALLY DISINTEGRATING ORAL
Qty: 30 | Refills: 4 | Status: COMPLETED
Start: 2019-02-08 | End: 2019-09-03

## 2019-02-08 RX ORDER — BISACODYL 10 MG
10 SUPPOSITORY, RECTAL RECTAL
Qty: 10 | Refills: 1 | Status: COMPLETED
Start: 2019-02-08 | End: 2019-09-03

## 2019-02-22 ENCOUNTER — HOSPITAL ENCOUNTER (OUTPATIENT)
Dept: PAIN MEDICINE | Facility: HOSPITAL | Age: 64
Discharge: HOME | End: 2019-02-22
Attending: ANESTHESIOLOGY | Admitting: ANESTHESIOLOGY

## 2019-02-22 LAB
AMPHETAMINES UR QL SCN: NEGATIVE
BARBITURATES UR QL SCN: NEGATIVE
BENZODIAZ UR QL SCN: NEGATIVE
BZE UR QL SCN: NEGATIVE
CREAT 24H UR-MCNC: ABNORMAL MG/DL
METHADONE UR QL SCN: NEGATIVE
OPIATE CONFIRMATION URINE: ABNORMAL
OPIATES TESTED UR SCN: ABNORMAL
OPIATES TESTED UR SCN: ABNORMAL
PCP UR QL: NEGATIVE
THC SERPLBLD CFM-MCNC: NEGATIVE NG/ML

## 2019-02-26 ENCOUNTER — HOSPITAL ENCOUNTER (OUTPATIENT)
Dept: LAB | Facility: HOSPITAL | Age: 64
Setting detail: SPECIMEN
Discharge: HOME | End: 2019-02-26
Attending: NURSE PRACTITIONER | Admitting: NURSE PRACTITIONER

## 2019-02-26 LAB
ALBUMIN SERPL-MCNC: 3.6 G/DL (ref 3.5–4.8)
ALBUMIN/GLOB SERPL: 1.1 {RATIO} (ref 1–1.7)
ALP SERPL-CCNC: 126 IU/L (ref 32–91)
ALT SERPL-CCNC: 16 IU/L (ref 14–54)
ANION GAP SERPL CALC-SCNC: 18.2 MMOL/L (ref 10–20)
AST SERPL-CCNC: 18 IU/L (ref 15–41)
BILIRUB SERPL-MCNC: 0.3 MG/DL (ref 0.3–1.2)
BUN SERPL-MCNC: 20 MG/DL (ref 8–20)
BUN/CREAT SERPL: 18.2 (ref 5.4–26.2)
CALCIUM SERPL-MCNC: 9.3 MG/DL (ref 8.9–10.3)
CHLORIDE SERPL-SCNC: 97 MMOL/L (ref 101–111)
CONV CO2: 27 MMOL/L (ref 22–32)
CONV TOTAL PROTEIN: 6.8 G/DL (ref 6.1–7.9)
CREAT UR-MCNC: 1.1 MG/DL (ref 0.4–1)
GLOBULIN UR ELPH-MCNC: 3.2 G/DL (ref 2.5–3.8)
GLUCOSE SERPL-MCNC: 178 MG/DL (ref 65–99)
POTASSIUM SERPL-SCNC: 4.2 MMOL/L (ref 3.6–5.1)
SODIUM SERPL-SCNC: 138 MMOL/L (ref 136–144)

## 2019-04-19 ENCOUNTER — HOSPITAL ENCOUNTER (OUTPATIENT)
Dept: PAIN MEDICINE | Facility: HOSPITAL | Age: 64
Discharge: HOME | End: 2019-04-19
Attending: ANESTHESIOLOGY | Admitting: ANESTHESIOLOGY

## 2019-05-29 ENCOUNTER — HOSPITAL ENCOUNTER (OUTPATIENT)
Dept: LAB | Facility: HOSPITAL | Age: 64
Setting detail: SPECIMEN
Discharge: HOME | End: 2019-05-29
Attending: NURSE PRACTITIONER | Admitting: NURSE PRACTITIONER

## 2019-05-29 LAB
ALBUMIN SERPL-MCNC: 3.6 G/DL (ref 3.5–4.8)
ALBUMIN/GLOB SERPL: 1.1 {RATIO} (ref 1–1.7)
ALP SERPL-CCNC: 96 IU/L (ref 32–91)
ALT SERPL-CCNC: 21 IU/L (ref 14–54)
ANION GAP SERPL CALC-SCNC: 18.2 MMOL/L (ref 10–20)
AST SERPL-CCNC: 31 IU/L (ref 15–41)
BILIRUB SERPL-MCNC: 0.4 MG/DL (ref 0.3–1.2)
BUN SERPL-MCNC: 14 MG/DL (ref 8–20)
BUN/CREAT SERPL: 12.7 (ref 5.4–26.2)
CALCIUM SERPL-MCNC: 9.3 MG/DL (ref 8.9–10.3)
CHLORIDE SERPL-SCNC: 99 MMOL/L (ref 101–111)
CHOLEST SERPL-MCNC: 191 MG/DL
CHOLEST/HDLC SERPL: 4.8 {RATIO}
CONV CO2: 25 MMOL/L (ref 22–32)
CONV LDL CHOLESTEROL DIRECT: 92 MG/DL (ref 0–100)
CONV TOTAL PROTEIN: 6.9 G/DL (ref 6.1–7.9)
CREAT UR-MCNC: 1.1 MG/DL (ref 0.4–1)
GLOBULIN UR ELPH-MCNC: 3.3 G/DL (ref 2.5–3.8)
GLUCOSE SERPL-MCNC: 186 MG/DL (ref 65–99)
HBA1C MFR BLD: 7.3 % (ref 0–5.6)
HDLC SERPL-MCNC: 40 MG/DL
LDLC/HDLC SERPL: 2.3 {RATIO}
LIPID INTERPRETATION: ABNORMAL
POTASSIUM SERPL-SCNC: 4.2 MMOL/L (ref 3.6–5.1)
SODIUM SERPL-SCNC: 138 MMOL/L (ref 136–144)
TRIGL SERPL-MCNC: 285 MG/DL
TSH SERPL-ACNC: 1.73 UIU/ML (ref 0.34–5.6)
VLDLC SERPL CALC-MCNC: 59.4 MG/DL

## 2019-06-05 ENCOUNTER — CONVERSION ENCOUNTER (OUTPATIENT)
Dept: ENDOCRINOLOGY | Facility: CLINIC | Age: 64
End: 2019-06-05

## 2019-06-05 VITALS
OXYGEN SATURATION: 90 % | WEIGHT: 195 LBS | HEART RATE: 84 BPM | BODY MASS INDEX: 33.29 KG/M2 | HEIGHT: 64 IN | SYSTOLIC BLOOD PRESSURE: 112 MMHG | DIASTOLIC BLOOD PRESSURE: 64 MMHG

## 2019-06-06 NOTE — PROGRESS NOTES
Visit Type:  Follow-up Visit  Referring Provider:  EBER Cunningham  Primary Provider:  Dottie VELÁZQUEZ    CC:  DM 2.    History of Present Illness:  This is a 64 years old female who presents with follow-up of diabetes Type 2.  She is currently taking Tresiba 30 units HS, Janumet 50/1000 mg XR 2 daily, and Novolog 4 units ac meals. She tells me, she forgets the Novolog with meals. The patient  denies   polyuria, polyphagia, nocturia, hypoglycemia requiring assistance, self managed hypoglycemia, nocturnal hypoglycemia, hypoglycemic unawareness and weight gain.  The patient denies any nausea and vomiting.  Since the last visit the patient admits to HBGM   testing:, dietary compliance is good and complying with medications.  Checks BS 3 x daily . Reports BS are high in the morning > 200 mg /dl. Labs showed A1c is 7.3 % -      Standards of Care   Discussed Carrying Glucose Source: Yes  Discussed Wear DM Alert ID: Advised  NO  Last Eye Exam: 2019  Influenza vaccine: 2018  Pneumovax: 2018      Past Medical History:     Reviewed history from 06/07/2018 and no changes required:        COPD        Atrial Fibrilation proxysmal        Hypertension        Hyperlipidemia        Diabetes, Type 2        GERD        Anxiety Disorder        Depression        IBS         Seizure Disorder        Obesity        dark stool        Weight Gain         Anemia        Arthritis        Hernia        Kidney Disease        Liver Disease        Thyroid Disorder    Past Surgical History:     Reviewed history from 03/24/2017 and no changes required:        Tonsillectomy        Cholecystectomy        Arthroscopy        Cardiac Cath: [facility] ?[facility], 3-16        back surgery        hysterectomy        EGD-3/31/15-normal        hernia repair-- 1-19-16  Dr Mota        COPD    Family History Summary:      Reviewed history Last on 04/30/2019 and no changes required:06/05/2019  Mother - Has Family History of Other Cancer - Entered On:  6/7/2018    General Comments - FH:  FH Heart Disease mother  FH Hypertension-both parents   FH Diabetes-paternal uncles  FH Stroke-mother  Cancer - uncles paternal side - colon cancer   FH Kidney Disease-father       Social History:     Reviewed history from 10/10/2018 and no changes required:        Patient currently smokes every day.        Patient has been counseled to quit.        Passive Smoke: N        Alcohol Use: N        Regular Exercise: N                Risk Factors:     Smoked Tobacco Use:  Current every day smoker     Cigarettes:  Yes -- 1/2 pack(s) per day,    Pack-years:  40        Year started:  started back ~ 6 months ago         Years smoked:  45  Smokeless Tobacco Use:  Never     Counseled to quit/cut down:  yes  Passive smoke exposure:  no  Drug use:  none  Caffeine use:  3 drinks per day  Alcohol use:  no  Exercise:  no  Seatbelt use:  100 %  Sun Exposure:  occasionally    Family History Risk Factors:     Family History of MI in females < 65 years old:  yes     Family History of MI in males < 55 years old:  no    Previous Tobacco Use: Signed On - 04/30/2019  Smoked Tobacco Use:  Current every day smoker     Cigarettes:  Yes -- 1/2 pack(s) per day,    Pack-years:  40        Year started:  started back ~ 6 months ago         Years smoked:  45  Smokeless Tobacco Use:  Never     Counseled to quit/cut down:  yes  Passive smoke exposure:  no  Drug use:  none  Caffeine use:  3 drinks per day    Previous Alcohol Use: Signed On - 04/30/2019  Alcohol use:  no  Exercise:  no  Seatbelt use:  100 %  Sun Exposure:  occasionally    Family History Risk Factors:     Family History of MI in females < 65 years old:  yes     Family History of MI in males < 55 years old:  no    Colonoscopy History:     Date of Last Colonoscopy:  01/02/2012    Mammogram History:     Date of Last Mammogram:  07/01/2009    Active Medications (reviewed today):  ZOHYDRO ER 15 MG ORAL CAPSULE ER 12 HOUR ABUSE-DETERRENT (HYDROCODONE  BITARTRATE) 1 po Q12H [BMN]  BUPROPION HCL ER (XL) 150 MG ORAL TABLET EXTENDED RELEASE 24 HOUR (BUPROPION HCL) Take one tablet every morning for depression  ARIPIPRAZOLE 15 MG ORAL TABLET (ARIPIPRAZOLE) Take one tablety by mouth once daily  ISOSORBIDE MONONITRATE ER 30 MG ORAL TABLET EXTENDED RELEASE 24 HOUR (ISOSORBIDE MONONITRATE) 1 po  qday  AMITIZA 24 MCG ORAL CAPSULE (LUBIPROSTONE) 1 po BID [BMN]  NOVOLOG FLEXPEN 100 UNIT/ML SUBCUTANEOUS SOLUTION PEN-INJECTOR (INSULIN ASPART) inject 5 units each meal  TRESIBA FLEXTOUCH 200 UNIT/ML SUBCUTANEOUS SOLUTION PEN-INJECTOR (INSULIN DEGLUDEC) Inject 30  units daily  JANUMET XR  MG ORAL TABLET EXTENDED RELEASE 24 HOUR (SITAGLIPTIN-METFORMIN HCL) Take 2 tablets by mouth daily with dinner  EQ COMPLETE MULTIVIT ADULT 50+ ORAL TABLET (MULTIPLE VITAMINS-MINERALS) Take 1 tablet by mouth daily  TRAZODONE  MG ORAL TABLET (TRAZODONE HCL) Take one tablet at bedtime  SYNTHROID 50 MCG ORAL TABLET (LEVOTHYROXINE SODIUM) Take 1 tablet by mouth daily  EFFEXOR  MG ORAL CAPSULE EXTENDED RELEASE 24 HOUR (VENLAFAXINE HCL) Take two capsules daily  LANCETS ULTRA THIN 30G (LANCETS) use to check BS 4 times daily DX E11.65  ALCOHOL SWABS 70 % PAD (ALCOHOL SWABS) use 7 times daily with insulin injections and blood sugar checks. DX E11.76  UNFINE PEN-NEEDLES 07AE7XR (INSULIN PEN NEEDLE) USE 3 TIMES DAILY WITH INSULIN INJECTIONS DX E11.65  NITROSTAT 0.4 MG SUBLINGUAL TABLET SUBLINGUAL (NITROGLYCERIN) Dissolve one (1) tablet under the tongue every five minutes as needed for chest pain. Do Not Exceed 3 tablets.  METOPROLOL TARTRATE 25 MG ORAL TABLET (METOPROLOL TARTRATE) Take 1 tablet by mouth twice a day.  FUROSEMIDE 40 MG ORAL TABLET (FUROSEMIDE) Take 1 tablet by mouth daily  ADVAIR DISKUS 250-50 MCG/DOSE INHALATION AEROSOL POWDER BREATH ACTIVATED (FLUTICASONE-SALMETEROL) Inhale 1 puff into the lungs two times daily for 30 days.  CETIRIZINE HCL 10 MG ORAL TABLET (CETIRIZINE  HCL) Take 1 tablet by mouth daily as needed for allergies or rhinitis  CLONAZEPAM 0.5 MG TABS (CLONAZEPAM) TAKE ONE TABLET BY MOUTH TWICE DAILY AS NEEDED FOR ANXIETY    Current Allergies (reviewed today):  SULFA (Critical)  PCN (Critical)  * ASPIRIN (Critical)  * STATINS (Critical)  * PAPER TAPE (Critical)  * IV DYE (Critical)  * DAYQIUL (Critical)  LATEX EXAM GLOVES (Severe)      Review of Systems     General       Denies fever, fever & chills, sweat, sweating, appetite loss, appetite, weight loss, malaise and fatigue.    Eyes       Denies double vision, vision loss - both eyes, blurring, halos and light sensitivity.    ENT       Denies ringing in the ears, earache and nosebleeds.    CV       Denies chest pain or discomfort, lightheadedness, palpitations and difficulty breathing while lying down.    Resp       Denies shortness of breath and chest discomfort.    GI       Denies loss of appetite, nausea, vomiting, abdominal pain, diarrhea, dark tarry stools and bloody stools.           Denies urinary urgency, painful urination, genital sores, other abnormal vaginal bleeding and pelvic pain.    MS       Complains of back pain.       Denies joint pain, presence of joint fluid, stiffness, loss of strength and muscle aches.    Derm       Denies excessive perspiration, night sweats, suspicious lesions, changes in nail beds, dryness, poor wound healing, unusual hair distribution, skin cancer, itching, changes in color of skin, flushing and rash.    Neuro       Denies difficulty with concentration, poor balance, headaches, disturbances in coordination, numbness, inability to speak, falling down, tingling, brief paralysis, visual disturbances, seizures, weakness, sensation of room spinning, tremors, fainting,   excessive daytime sleeping and memory loss.    Psych       Denies sense of great danger, anxiety, thoughts of suicide, mental problems, depression, thoughts of violence and frightening visions or sounds.    Endo        Denies excessive hunger, cold intolerance, heat intolerance, excessive urination, excessive thirst and weight change.    Heme       Denies enlarged lymph nodes, bleeding, skin discoloration, abnormal bruising and fevers.    Allergy       Denies persistent infections, hives or rash, seasonal allergies and HIV exposure.      Vital Signs:    Patient Profile:    64 Years Old Female  Height:     64 inches (161.29 cm)  Weight:     195 pounds  BMI:        33.47     O2 Sat:     90 %  Pulse rate: 84 / minute  BP Sittin / 64  (left arm)    Cuff size:  large      Problems: Active problems were reviewed with the patient during this visit.  Medications: Medications were reviewed with the patient during this visit.  Allergies: Allergies were reviewed with the patient during this visit.        Vitals Entered By: Fernanda Zimmerman (2019 9:11 AM)      Physical Exam    General:      Well developed, well nourished, in no acute distress. Voice and ability to communicate normal with no hoarseness or stridor.  Head:      Normocephalic and atraumatic.  Eyes:      Head, eyes, ears, nose and throat examination reveals pupils that are equally reactive light. Conjunctivae are clear.   Neck:      Neck is supple with no JVD or LAD.  Thyroid is not palpable.  No Carotid bruits are heard at this time.  Lungs:      Chest and lungs are symmetrical and clear to auscultation bilaterally.   Heart:      Cardiovascular examination reveals S1 and S2 are regular with no murmurs heard at this time.  Abdomen:      Abdomen is soft and nontender with no organomegaly.  Bowel sounds are positive.  Msk:      No deformity or scoliosis noted with normal posture and gait.  Extremities:      No ulcer / or swelling   Neurologic:      No focal deficits, CN II-XII grossly intact. Hearing normal to conversational speech.   Skin:      Intact without lesions or rashes.  Cervical Nodes:      No significant adenopathy.  Psych:      Alert and cooperative;  normal mood and affect; normal attention span and concentration.      Blood Pressure:  Today's BP: 112/64 mm Hg    Labwork:   Most Recent Lab Results:   LDL: 92 mg/dL 05/29/2019  HbA1c: : 7.3 % 05/29/2019      Impression & Recommendations:    Problem # 1:  DIABETES, TYPE 2 (ICD-250.00) (AGG92-W03.9)  Assessment: Comment Only  A1c is 7.3 % - acceptable . BS are running high in the morning .   Advised to take tresiba at bedtime .     Her updated medication list for this problem includes:     Novolog Flexpen 100 Unit/ml Subcutaneous Solution Pen-injector (Insulin aspart) ..... Inject 4 units in the am     Tresiba Flextouch 200 Unit/ml Subcutaneous Solution Pen-injector (Insulin degludec) ..... Inject 20   units daily at bedtime     Janumet Xr  Mg Oral Tablet Extended Release 24 Hour (Sitagliptin-metformin hcl) ..... Take 2 tablets by mouth daily with dinner    Orders:  Glucose (55226)  Ofc Vst, Est Level IV (55140)  [facility] HEMOGLOBIN A1c (A1DCA)  [facility] BASIC METABOLIC PANEL (BMP) (MPB)      Problem # 2:  HYPERTENSION (ICD-401.9) ([id number])  Assessment: Comment Only  BP is at goal. Continue same meds   Her updated medication list for this problem includes:     Metoprolol Tartrate 25 Mg Oral Tablet (Metoprolol tartrate) ..... Take 1 tablet by mouth twice a day.     Furosemide 40 Mg Oral Tablet (Furosemide) ..... Take 1 tablet by mouth daily    Orders:  Ofc Vst, Est Level IV (01765)  [facility] HEMOGLOBIN A1c (A1DCA)  [facility] BASIC METABOLIC PANEL (BMP) (MPB)      Problem # 3:  HYPERLIPIDEMIA (ICD-272.4) (HOH54-G68.5)  Add Atorvastatin - 10 mg per day .   Her updated medication list for this problem includes:     Atorvastatin Calcium 10 Mg Oral Tablet (Atorvastatin calcium) ..... One tab po daily    Orders:  Ofc Vst, Est Level IV (38732)  [facility] HEMOGLOBIN A1c (A1DCA)  [facility] BASIC METABOLIC PANEL (BMP) (MPB)      Medications Added to Medication List This Visit:  1)  Atorvastatin Calcium 10 Mg Oral Tablet (Atorvastatin calcium) ....  One tab po daily  2)  Novolog Flexpen 100 Unit/ml Subcutaneous Solution Pen-injector (Insulin aspart) .... Inject 4 units in the am  3)  Tresiba Flextouch 200 Unit/ml Subcutaneous Solution Pen-injector (Insulin degludec) .... Inject 20   units daily at bedtime      Patient Instructions:  1)  Please schedule a follow-up appointment in 3 months.  2)  Discussed importance of regular exercise and recommended starting or continuing a regular exercise program for good health.  3)  The patient was encouraged to lose weight for better health.                    Medication Administration    Orders Added:  1)  Glucose [05981]  2)  Ofc Vst, Est Level IV [24196]  3)  [facility] HEMOGLOBIN A1c [A1DCA]  4)  [facility] BASIC METABOLIC PANEL (BMP) [MPB]  ]  Technician: Ubaldo Irizarry         Date/Time Collected: June 5, 2019 9:10 AM)  Date/Time Received: June 5, 2019 9:10 AM)  Performed by: UBALDO IRIZARRY    Glucose (fast): 144 mg/dL       65-99 mg/dL (normal range)                Electronically signed by Kiki De Los Santos MD on 06/05/2019 at 10:09 AM  ________________________________________________________________________       Disclaimer: Converted Note message may not contain all data elements that existed in the legacy source system. Please see [employer] for the original note details.

## 2019-06-19 ENCOUNTER — TELEPHONE (OUTPATIENT)
Dept: ENDOCRINOLOGY | Facility: CLINIC | Age: 64
End: 2019-06-19

## 2019-06-19 NOTE — TELEPHONE ENCOUNTER
Pt called to say Bg has been elevated in am.   She has not been taking the 20 of Tresiba as EMR shows. She has been taking 14 units. Will increase back up to 18 units at hs and call BG next week. May increase back up to dose as Rx after reassessing BG next week.

## 2019-06-20 ENCOUNTER — TELEPHONE (OUTPATIENT)
Dept: PSYCHIATRY | Facility: CLINIC | Age: 64
End: 2019-06-20

## 2019-06-20 RX ORDER — BACLOFEN 5 MG/1
TABLET ORAL
Refills: 0 | COMMUNITY
Start: 2019-06-04 | End: 2019-07-30 | Stop reason: ALTCHOICE

## 2019-06-20 RX ORDER — VENLAFAXINE HYDROCHLORIDE 150 MG/1
2 CAPSULE, EXTENDED RELEASE ORAL EVERY 24 HOURS
COMMUNITY
Start: 2018-02-23 | End: 2019-07-12 | Stop reason: SDUPTHER

## 2019-06-20 RX ORDER — CLONAZEPAM 0.5 MG/1
TABLET ORAL EVERY 12 HOURS
COMMUNITY
End: 2019-08-29 | Stop reason: SDUPTHER

## 2019-06-20 RX ORDER — BUPROPION HYDROCHLORIDE 150 MG/1
TABLET ORAL
Refills: 3 | COMMUNITY
Start: 2019-03-20 | End: 2019-07-15 | Stop reason: SDUPTHER

## 2019-06-20 RX ORDER — ARIPIPRAZOLE 15 MG/1
TABLET ORAL
COMMUNITY
Start: 2019-03-20 | End: 2019-07-12 | Stop reason: SDUPTHER

## 2019-06-20 RX ORDER — TRAZODONE HYDROCHLORIDE 150 MG/1
TABLET ORAL
Refills: 3 | COMMUNITY
Start: 2019-03-20 | End: 2019-07-12 | Stop reason: SDUPTHER

## 2019-06-20 NOTE — TELEPHONE ENCOUNTER
Patient is requesting increase on clonazePAM (KlonoPIN) 0.5 MG tablet she states she is having panic attacks. Patient cancelled appt today due to not feeling well

## 2019-06-28 ENCOUNTER — APPOINTMENT (OUTPATIENT)
Dept: PAIN MEDICINE | Facility: HOSPITAL | Age: 64
End: 2019-06-28

## 2019-07-12 ENCOUNTER — APPOINTMENT (OUTPATIENT)
Dept: PAIN MEDICINE | Facility: HOSPITAL | Age: 64
End: 2019-07-12

## 2019-07-15 RX ORDER — TRAZODONE HYDROCHLORIDE 150 MG/1
TABLET ORAL
Qty: 30 TABLET | Refills: 3 | Status: SHIPPED | OUTPATIENT
Start: 2019-07-15 | End: 2019-07-30 | Stop reason: ALTCHOICE

## 2019-07-15 RX ORDER — ARIPIPRAZOLE 15 MG/1
TABLET ORAL
Qty: 30 TABLET | Refills: 3 | Status: SHIPPED | OUTPATIENT
Start: 2019-07-15 | End: 2019-07-30 | Stop reason: ALTCHOICE

## 2019-07-15 RX ORDER — VENLAFAXINE HYDROCHLORIDE 150 MG/1
CAPSULE, EXTENDED RELEASE ORAL
Qty: 60 CAPSULE | Refills: 3 | Status: SHIPPED | OUTPATIENT
Start: 2019-07-15 | End: 2019-10-14 | Stop reason: SDUPTHER

## 2019-07-16 RX ORDER — BUPROPION HYDROCHLORIDE 150 MG/1
TABLET ORAL
Qty: 30 TABLET | Refills: 3 | Status: SHIPPED | OUTPATIENT
Start: 2019-07-16 | End: 2019-07-30 | Stop reason: ALTCHOICE

## 2019-07-30 ENCOUNTER — OFFICE VISIT (OUTPATIENT)
Dept: PAIN MEDICINE | Facility: CLINIC | Age: 64
End: 2019-07-30

## 2019-07-30 VITALS
WEIGHT: 196 LBS | DIASTOLIC BLOOD PRESSURE: 77 MMHG | HEART RATE: 90 BPM | HEIGHT: 64 IN | RESPIRATION RATE: 16 BRPM | SYSTOLIC BLOOD PRESSURE: 117 MMHG | TEMPERATURE: 97.9 F | BODY MASS INDEX: 33.46 KG/M2

## 2019-07-30 DIAGNOSIS — G89.29 CHRONIC MIDLINE LOW BACK PAIN WITH SCIATICA, SCIATICA LATERALITY UNSPECIFIED: ICD-10-CM

## 2019-07-30 DIAGNOSIS — M25.562 ARTHRALGIA OF LEFT KNEE: ICD-10-CM

## 2019-07-30 DIAGNOSIS — M46.96 LUMBAR SPONDYLITIS (HCC): ICD-10-CM

## 2019-07-30 DIAGNOSIS — M79.604 LEG PAIN, BILATERAL: ICD-10-CM

## 2019-07-30 DIAGNOSIS — M47.816 LUMBAR SPONDYLOSIS: ICD-10-CM

## 2019-07-30 DIAGNOSIS — Z79.899 OTHER LONG TERM (CURRENT) DRUG THERAPY: ICD-10-CM

## 2019-07-30 DIAGNOSIS — M54.12 CERVICAL RADICULOPATHY: ICD-10-CM

## 2019-07-30 DIAGNOSIS — M54.40 CHRONIC MIDLINE LOW BACK PAIN WITH SCIATICA, SCIATICA LATERALITY UNSPECIFIED: ICD-10-CM

## 2019-07-30 DIAGNOSIS — M48.061 SPINAL STENOSIS OF LUMBAR REGION, UNSPECIFIED WHETHER NEUROGENIC CLAUDICATION PRESENT: ICD-10-CM

## 2019-07-30 DIAGNOSIS — M54.16 LUMBAR RADICULOPATHY: ICD-10-CM

## 2019-07-30 DIAGNOSIS — M54.40 LUMBAGO OF LUMBAR REGION WITH SCIATICA: ICD-10-CM

## 2019-07-30 DIAGNOSIS — M79.605 LEG PAIN, BILATERAL: ICD-10-CM

## 2019-07-30 DIAGNOSIS — M54.2 NECK PAIN: ICD-10-CM

## 2019-07-30 DIAGNOSIS — K59.03 CONSTIPATION DUE TO PAIN MEDICATION: Primary | ICD-10-CM

## 2019-07-30 PROCEDURE — G0463 HOSPITAL OUTPT CLINIC VISIT: HCPCS | Performed by: PHYSICAL MEDICINE & REHABILITATION

## 2019-07-30 PROCEDURE — 99214 OFFICE O/P EST MOD 30 MIN: CPT | Performed by: PHYSICAL MEDICINE & REHABILITATION

## 2019-07-30 RX ORDER — INSULIN DEGLUDEC INJECTION 100 U/ML
INJECTION, SOLUTION SUBCUTANEOUS
Refills: 5 | COMMUNITY
Start: 2019-06-14 | End: 2019-11-14

## 2019-07-30 RX ORDER — PEN NEEDLE, DIABETIC 32GX 5/32"
NEEDLE, DISPOSABLE MISCELLANEOUS
Refills: 12 | COMMUNITY
Start: 2019-07-19 | End: 2019-10-28 | Stop reason: SDUPTHER

## 2019-07-30 RX ORDER — ALBUTEROL SULFATE 2.5 MG/3ML
SOLUTION RESPIRATORY (INHALATION)
COMMUNITY
Start: 2018-01-11 | End: 2019-11-14

## 2019-07-30 RX ORDER — TIZANIDINE 2 MG/1
TABLET ORAL
Refills: 0 | COMMUNITY
Start: 2019-07-10 | End: 2019-08-07 | Stop reason: SDUPTHER

## 2019-07-30 RX ORDER — OXYCODONE AND ACETAMINOPHEN 10; 325 MG/1; MG/1
1 TABLET ORAL EVERY 6 HOURS PRN
Qty: 90 TABLET | Refills: 0 | Status: SHIPPED | OUTPATIENT
Start: 2019-07-30 | End: 2019-07-30 | Stop reason: SDUPTHER

## 2019-07-30 RX ORDER — NITROGLYCERIN 0.4 MG/1
TABLET SUBLINGUAL
Refills: 3 | COMMUNITY
Start: 2019-04-30 | End: 2019-11-14

## 2019-07-30 RX ORDER — CETIRIZINE HYDROCHLORIDE 10 MG/1
TABLET ORAL
COMMUNITY
Start: 2016-08-05 | End: 2019-11-14

## 2019-07-30 RX ORDER — LANCETS 30 GAUGE
EACH MISCELLANEOUS
COMMUNITY
Start: 2017-09-20 | End: 2019-11-14

## 2019-07-30 RX ORDER — TRAZODONE HYDROCHLORIDE 100 MG/1
100 TABLET ORAL NIGHTLY
COMMUNITY
End: 2019-10-29 | Stop reason: ALTCHOICE

## 2019-07-30 RX ORDER — HYDROCODONE BITARTRATE AND ACETAMINOPHEN 10; 325 MG/1; MG/1
TABLET ORAL
Refills: 0 | COMMUNITY
Start: 2019-07-10 | End: 2019-07-30

## 2019-07-30 RX ORDER — PREGABALIN 100 MG/1
CAPSULE ORAL
COMMUNITY
Start: 2019-06-17 | End: 2019-10-14 | Stop reason: SDUPTHER

## 2019-07-30 RX ORDER — CIMETIDINE 400 MG/1
TABLET, FILM COATED ORAL
Refills: 3 | COMMUNITY
Start: 2019-07-03 | End: 2019-11-14

## 2019-07-30 RX ORDER — LUBIPROSTONE 24 UG/1
24 CAPSULE ORAL
COMMUNITY
Start: 2019-02-22 | End: 2020-05-28 | Stop reason: SDUPTHER

## 2019-07-30 RX ORDER — DICLOFENAC SODIUM 75 MG/1
75 TABLET, DELAYED RELEASE ORAL 2 TIMES DAILY
Refills: 0 | COMMUNITY
Start: 2019-06-24 | End: 2022-06-09

## 2019-07-30 RX ORDER — FLUTICASONE PROPIONATE 50 MCG
2 SPRAY, SUSPENSION (ML) NASAL DAILY
Refills: 11 | COMMUNITY
Start: 2019-05-07 | End: 2019-11-14

## 2019-07-30 RX ORDER — OXYCODONE AND ACETAMINOPHEN 10; 325 MG/1; MG/1
1 TABLET ORAL EVERY 6 HOURS PRN
Qty: 21 TABLET | Refills: 0 | Status: SHIPPED | OUTPATIENT
Start: 2019-07-30 | End: 2019-07-30 | Stop reason: SDUPTHER

## 2019-07-30 RX ORDER — PROMETHAZINE HYDROCHLORIDE 25 MG/1
25 TABLET ORAL DAILY
Refills: 1 | COMMUNITY
Start: 2019-06-04 | End: 2019-10-14

## 2019-07-30 RX ORDER — OXYCODONE AND ACETAMINOPHEN 10; 325 MG/1; MG/1
1 TABLET ORAL EVERY 6 HOURS PRN
Qty: 90 TABLET | Refills: 0 | Status: SHIPPED | OUTPATIENT
Start: 2019-07-30 | End: 2019-09-30

## 2019-07-30 RX ORDER — MULTIVITAMIN/IRON/FOLIC ACID 18MG-0.4MG
TABLET ORAL
COMMUNITY
Start: 2018-06-07 | End: 2019-11-14 | Stop reason: SDUPTHER

## 2019-07-30 RX ORDER — FUROSEMIDE 40 MG/1
TABLET ORAL
COMMUNITY
Start: 2016-08-05 | End: 2019-11-14 | Stop reason: SDUPTHER

## 2019-07-30 RX ORDER — OXYCODONE AND ACETAMINOPHEN 10; 325 MG/1; MG/1
1 TABLET ORAL EVERY 6 HOURS PRN
Qty: 69 TABLET | Refills: 0 | Status: SHIPPED | OUTPATIENT
Start: 2019-07-30 | End: 2019-07-30 | Stop reason: SDUPTHER

## 2019-07-30 RX ORDER — LEVOTHYROXINE SODIUM 0.05 MG/1
TABLET ORAL
COMMUNITY
Start: 2018-06-07 | End: 2019-11-14

## 2019-07-30 RX ORDER — INSULIN ASPART 100 [IU]/ML
INJECTION, SOLUTION INTRAVENOUS; SUBCUTANEOUS
Refills: 3 | COMMUNITY
Start: 2019-06-14 | End: 2019-11-14

## 2019-07-30 NOTE — PROGRESS NOTES
Subjective   Marlena Avilez is a 64 y.o. female.     Low back and yen. legs pain,   Lumbar Back Pain with BLLE Pain,  RLE Numbness and RLE Weakness,  Lumbar Back Pain is increased with prolonged sitting.  Patient planned Lumbar Epidural #1 with Dr. Pleitez, reports very little relief with LESIs. Pain is 9/10 at best, aching, throbbing, worse with bending and standing, interferes with ADLs, activity, failed injections, meds. MRI L-spine with laminectomy and fusion. Taking Zohydro 15mg BID with Dr. Pleitez with poor relief, Norco 10mg TID with PCP with poor relief, had good relief in past with Percocet 10mg TID prn. Current patient of this clinic.         The following portions of the patient's history were reviewed and updated as appropriate: allergies, current medications, past family history, past medical history, past social history, past surgical history and problem list.    Review of Systems   Constitutional: Negative for chills, fatigue and fever.   HENT: Positive for hearing loss. Negative for trouble swallowing.    Eyes: Positive for visual disturbance.   Respiratory: Positive for shortness of breath.    Cardiovascular: Negative for chest pain.   Gastrointestinal: Negative for abdominal pain, constipation, diarrhea, nausea and vomiting.   Genitourinary: Negative for urinary incontinence.   Musculoskeletal: Negative for arthralgias, back pain, joint swelling, myalgias and neck pain.   Neurological: Positive for headache. Negative for dizziness, weakness and numbness.       Objective   Physical Exam   Constitutional: She is oriented to person, place, and time. She appears well-developed and well-nourished.   HENT:   Head: Normocephalic and atraumatic.   Eyes: EOM are normal. Pupils are equal, round, and reactive to light.   Neck: Normal range of motion.   Cardiovascular: Normal rate, regular rhythm, normal heart sounds and intact distal pulses.   Pulmonary/Chest: Breath sounds normal.   Abdominal: Soft. Bowel  sounds are normal. She exhibits no distension. There is no tenderness.   Musculoskeletal:   Strength 4/5 globally   Neurological: She is alert and oriented to person, place, and time. She has normal strength and normal reflexes. She displays normal reflexes. No sensory deficit.   Psychiatric: She has a normal mood and affect. Her behavior is normal. Thought content normal.         Assessment/Plan   Marlena was seen today for back pain and leg pain.    Diagnoses and all orders for this visit:    Constipation due to pain medication    Leg pain, bilateral    Cervical radiculopathy    Chronic midline low back pain with sciatica, sciatica laterality unspecified    Lumbago of lumbar region with sciatica    Lumbar radiculopathy    Neck pain    Arthralgia of left knee    Lumbar spondylitis (CMS/HCC)    Lumbar spondylosis    Spinal stenosis of lumbar region, unspecified whether neurogenic claudication present    Other long term (current) drug therapy        Inspect reviewed, in order. UDS 2/22/19 in order.  Stop Zohydro, Norco. Begin Percocet 10mg TID prn which has worked well in past, cont Zanaflex 2mg qHS prn.  RTC 2-3 months for f/u.

## 2019-08-07 ENCOUNTER — TELEPHONE (OUTPATIENT)
Dept: PAIN MEDICINE | Facility: HOSPITAL | Age: 64
End: 2019-08-07

## 2019-08-07 RX ORDER — TIZANIDINE 2 MG/1
2 TABLET ORAL EVERY 8 HOURS PRN
Qty: 90 TABLET | Refills: 2 | Status: SHIPPED | OUTPATIENT
Start: 2019-08-07 | End: 2019-09-30 | Stop reason: SDUPTHER

## 2019-08-19 ENCOUNTER — OFFICE (AMBULATORY)
Dept: URBAN - METROPOLITAN AREA CLINIC 64 | Facility: CLINIC | Age: 64
End: 2019-08-19

## 2019-08-19 VITALS
HEIGHT: 63 IN | HEART RATE: 93 BPM | WEIGHT: 194 LBS | DIASTOLIC BLOOD PRESSURE: 65 MMHG | SYSTOLIC BLOOD PRESSURE: 112 MMHG

## 2019-08-19 DIAGNOSIS — R13.10 DYSPHAGIA, UNSPECIFIED: ICD-10-CM

## 2019-08-19 DIAGNOSIS — R19.5 OTHER FECAL ABNORMALITIES: ICD-10-CM

## 2019-08-19 DIAGNOSIS — K59.00 CONSTIPATION, UNSPECIFIED: ICD-10-CM

## 2019-08-19 PROCEDURE — 99214 OFFICE O/P EST MOD 30 MIN: CPT | Performed by: NURSE PRACTITIONER

## 2019-08-19 RX ORDER — LINACLOTIDE 290 UG/1
290 CAPSULE, GELATIN COATED ORAL
Qty: 90 | Refills: 3 | Status: COMPLETED
Start: 2019-08-19 | End: 2019-08-28

## 2019-08-19 RX ORDER — HYDROCORTISONE ACETATE 25 MG/1
25 SUPPOSITORY RECTAL
Qty: 14 | Refills: 1 | Status: COMPLETED
Start: 2019-08-19 | End: 2019-09-03

## 2019-08-21 ENCOUNTER — TELEPHONE (OUTPATIENT)
Dept: PAIN MEDICINE | Facility: HOSPITAL | Age: 64
End: 2019-08-21

## 2019-08-21 NOTE — TELEPHONE ENCOUNTER
Pt states she has whelps on her arms and legs and forehead. She is wondering if it could be her Percocet. She started percocet 7/20/19 and has not had a problem with it. She has also taken it in the past without problem.  She also recently started Linzess-3 days ago. I told her to hold both the Percocet and Linzess and see if the whelps improved.  I informed her to call Dr Fabi Gottlieb who ordered the Linzess to tell her about the whelps and to see if she would like to order her something different. Pt said she would try to do without her Percocet for a day or two to see if the whelps resolve.  She denies SOA.  Would you like to do anything else.

## 2019-08-21 NOTE — TELEPHONE ENCOUNTER
It's unlikely to be the Percocet, probably the Linzess. She could try benadryl for the welts.     PS Whelp is another word for puppy.

## 2019-08-28 NOTE — TELEPHONE ENCOUNTER
Spoke to Marlena today to follow up on reaction.  She states the rash/welts are gone. She stopped the Linzess and continues taking the Percocet. So the Linzess was causing the rash.  Instructed her to call the physician that ordered the Linzess to inform them of the reaction and to get a different medication for her constipation. Verbalized understanding

## 2019-08-29 DIAGNOSIS — E11.9 TYPE 2 DIABETES MELLITUS WITHOUT COMPLICATION, UNSPECIFIED WHETHER LONG TERM INSULIN USE (HCC): Primary | ICD-10-CM

## 2019-08-29 PROBLEM — E66.01 MORBID OBESITY (HCC): Status: ACTIVE | Noted: 2018-06-07

## 2019-08-29 PROBLEM — F17.200 CURRENT EVERY DAY SMOKER: Status: ACTIVE | Noted: 2019-08-29

## 2019-08-29 PROBLEM — K22.2 ESOPHAGEAL STRICTURE: Status: ACTIVE | Noted: 2018-06-07

## 2019-08-29 PROBLEM — I10 HYPERTENSION: Status: ACTIVE | Noted: 2018-05-15

## 2019-08-29 PROBLEM — R29.90 SYMPTOM REFERRABLE TO NERVOUS SYSTEM: Status: ACTIVE | Noted: 2018-06-07

## 2019-08-29 PROBLEM — Z98.890 HISTORY OF TRACHEOSTOMY: Status: ACTIVE | Noted: 2019-08-29

## 2019-08-29 PROBLEM — R00.2 PALPITATIONS: Status: ACTIVE | Noted: 2017-12-18

## 2019-08-29 PROBLEM — I48.91 ATRIAL FIBRILLATION (HCC): Status: ACTIVE | Noted: 2019-08-29

## 2019-08-29 RX ORDER — CLONAZEPAM 0.5 MG/1
TABLET ORAL
Qty: 60 TABLET | Refills: 0 | Status: SHIPPED | OUTPATIENT
Start: 2019-08-29 | End: 2019-10-14 | Stop reason: ALTCHOICE

## 2019-08-29 NOTE — TELEPHONE ENCOUNTER
Please let Marlena know that this is the last refill until she is seen since she missed her June appt.  She can make this last until October appt or call daily to for cancellation to be seen sooner.

## 2019-09-03 ENCOUNTER — ON CAMPUS - OUTPATIENT (AMBULATORY)
Dept: URBAN - METROPOLITAN AREA HOSPITAL 2 | Facility: HOSPITAL | Age: 64
End: 2019-09-03

## 2019-09-03 VITALS
OXYGEN SATURATION: 93 % | DIASTOLIC BLOOD PRESSURE: 67 MMHG | HEART RATE: 97 BPM | DIASTOLIC BLOOD PRESSURE: 64 MMHG | HEART RATE: 90 BPM | OXYGEN SATURATION: 95 % | TEMPERATURE: 97.8 F | SYSTOLIC BLOOD PRESSURE: 131 MMHG | DIASTOLIC BLOOD PRESSURE: 65 MMHG | HEART RATE: 93 BPM | SYSTOLIC BLOOD PRESSURE: 97 MMHG | OXYGEN SATURATION: 90 % | RESPIRATION RATE: 14 BRPM | OXYGEN SATURATION: 92 % | DIASTOLIC BLOOD PRESSURE: 69 MMHG | RESPIRATION RATE: 18 BRPM | HEART RATE: 91 BPM | HEIGHT: 63 IN | HEART RATE: 86 BPM | OXYGEN SATURATION: 97 % | SYSTOLIC BLOOD PRESSURE: 118 MMHG | DIASTOLIC BLOOD PRESSURE: 73 MMHG | WEIGHT: 191 LBS | HEART RATE: 103 BPM | SYSTOLIC BLOOD PRESSURE: 125 MMHG | SYSTOLIC BLOOD PRESSURE: 132 MMHG | OXYGEN SATURATION: 99 % | HEART RATE: 89 BPM | DIASTOLIC BLOOD PRESSURE: 78 MMHG | OXYGEN SATURATION: 96 % | SYSTOLIC BLOOD PRESSURE: 114 MMHG | DIASTOLIC BLOOD PRESSURE: 77 MMHG | RESPIRATION RATE: 16 BRPM

## 2019-09-03 DIAGNOSIS — K22.2 ESOPHAGEAL OBSTRUCTION: ICD-10-CM

## 2019-09-03 DIAGNOSIS — R13.10 DYSPHAGIA, UNSPECIFIED: ICD-10-CM

## 2019-09-03 DIAGNOSIS — K31.811 ANGIODYSPLASIA OF STOMACH AND DUODENUM WITH BLEEDING: ICD-10-CM

## 2019-09-03 PROCEDURE — 43255 EGD CONTROL BLEEDING ANY: CPT | Performed by: INTERNAL MEDICINE

## 2019-09-03 PROCEDURE — 43450 DILATE ESOPHAGUS 1/MULT PASS: CPT | Performed by: INTERNAL MEDICINE

## 2019-09-03 RX ORDER — PANTOPRAZOLE SODIUM 40 MG/1
80 TABLET, DELAYED RELEASE ORAL
Qty: 60 | Refills: 3 | Status: COMPLETED
Start: 2019-09-03 | End: 2021-10-27

## 2019-09-03 RX ADMIN — IPRATROPIUM BROMIDE: 21 SPRAY, METERED NASAL at 14:05

## 2019-09-03 RX ADMIN — ALBUTEROL SULFATE: 0.63 SOLUTION INTRABRONCHIAL at 14:04

## 2019-09-24 RX ORDER — CLONAZEPAM 0.5 MG/1
TABLET ORAL
Qty: 60 TABLET | Refills: 0 | OUTPATIENT
Start: 2019-09-24

## 2019-09-24 NOTE — TELEPHONE ENCOUNTER
Please let Marlena know that the las refill given in August was it until she is seen since she missed her June appt.  She was supposed to make the refill last until October appt or call daily to for cancellation to be seen sooner

## 2019-09-25 RX ORDER — CLONAZEPAM 0.5 MG/1
TABLET ORAL
Qty: 60 TABLET | Refills: 0 | OUTPATIENT
Start: 2019-09-25

## 2019-09-25 NOTE — TELEPHONE ENCOUNTER
Please let Marlena know that the August refill was the last refill until she is seen since she missed her June appt. She must be seen for further refills.  She can call daily for cancellation

## 2019-09-30 ENCOUNTER — OFFICE VISIT (OUTPATIENT)
Dept: PAIN MEDICINE | Facility: CLINIC | Age: 64
End: 2019-09-30

## 2019-09-30 VITALS
WEIGHT: 191 LBS | RESPIRATION RATE: 16 BRPM | HEIGHT: 67 IN | OXYGEN SATURATION: 97 % | SYSTOLIC BLOOD PRESSURE: 141 MMHG | TEMPERATURE: 97.1 F | DIASTOLIC BLOOD PRESSURE: 84 MMHG | HEART RATE: 105 BPM | BODY MASS INDEX: 29.98 KG/M2

## 2019-09-30 DIAGNOSIS — M54.40 CHRONIC MIDLINE LOW BACK PAIN WITH SCIATICA, SCIATICA LATERALITY UNSPECIFIED: ICD-10-CM

## 2019-09-30 DIAGNOSIS — G89.29 CHRONIC MIDLINE LOW BACK PAIN WITH SCIATICA, SCIATICA LATERALITY UNSPECIFIED: ICD-10-CM

## 2019-09-30 DIAGNOSIS — M79.605 LEG PAIN, BILATERAL: ICD-10-CM

## 2019-09-30 DIAGNOSIS — M54.12 CERVICAL RADICULOPATHY: ICD-10-CM

## 2019-09-30 DIAGNOSIS — M54.2 NECK PAIN: ICD-10-CM

## 2019-09-30 DIAGNOSIS — M54.40 LUMBAGO OF LUMBAR REGION WITH SCIATICA: ICD-10-CM

## 2019-09-30 DIAGNOSIS — K59.03 CONSTIPATION DUE TO PAIN MEDICATION: Primary | ICD-10-CM

## 2019-09-30 DIAGNOSIS — M79.604 LEG PAIN, BILATERAL: ICD-10-CM

## 2019-09-30 DIAGNOSIS — M54.16 LUMBAR RADICULOPATHY: ICD-10-CM

## 2019-09-30 PROCEDURE — G0463 HOSPITAL OUTPT CLINIC VISIT: HCPCS | Performed by: PHYSICAL MEDICINE & REHABILITATION

## 2019-09-30 PROCEDURE — 99213 OFFICE O/P EST LOW 20 MIN: CPT | Performed by: PHYSICAL MEDICINE & REHABILITATION

## 2019-09-30 RX ORDER — HYDROCODONE BITARTRATE AND ACETAMINOPHEN 10; 325 MG/1; MG/1
1 TABLET ORAL EVERY 6 HOURS PRN
Qty: 120 TABLET | Refills: 0 | Status: SHIPPED | OUTPATIENT
Start: 2019-09-30 | End: 2019-12-30 | Stop reason: SDUPTHER

## 2019-09-30 RX ORDER — CEPHALEXIN 500 MG/1
CAPSULE ORAL
Refills: 0 | COMMUNITY
Start: 2019-07-31 | End: 2019-11-14

## 2019-09-30 RX ORDER — ROPINIROLE 0.5 MG/1
TABLET, FILM COATED ORAL
COMMUNITY
Start: 2019-09-09 | End: 2019-10-14

## 2019-09-30 RX ORDER — HYDROCODONE BITARTRATE AND ACETAMINOPHEN 10; 325 MG/1; MG/1
1 TABLET ORAL EVERY 6 HOURS PRN
Qty: 120 TABLET | Refills: 0 | Status: SHIPPED | OUTPATIENT
Start: 2019-09-30 | End: 2019-09-30 | Stop reason: SDUPTHER

## 2019-09-30 RX ORDER — AMMONIUM LACTATE 12 G/100G
LOTION TOPICAL
COMMUNITY
Start: 2019-01-07 | End: 2019-11-14

## 2019-09-30 RX ORDER — PROMETHAZINE HYDROCHLORIDE 25 MG/1
25 TABLET ORAL
COMMUNITY
Start: 2019-06-04 | End: 2019-10-14

## 2019-09-30 RX ORDER — CYCLOBENZAPRINE HCL 10 MG
TABLET ORAL
Refills: 0 | COMMUNITY
Start: 2019-08-29 | End: 2019-11-14

## 2019-09-30 RX ORDER — OXYCODONE AND ACETAMINOPHEN 10; 325 MG/1; MG/1
1 TABLET ORAL
COMMUNITY
Start: 2019-07-30 | End: 2019-09-30

## 2019-09-30 RX ORDER — PANTOPRAZOLE SODIUM 40 MG/1
40 TABLET, DELAYED RELEASE ORAL DAILY
COMMUNITY
Start: 2019-08-12 | End: 2022-02-10

## 2019-09-30 RX ORDER — FUROSEMIDE 40 MG/1
TABLET ORAL
COMMUNITY
Start: 2019-09-09 | End: 2019-11-14

## 2019-09-30 RX ORDER — TIZANIDINE 2 MG/1
2 TABLET ORAL
COMMUNITY
Start: 2019-08-07 | End: 2019-10-14

## 2019-09-30 RX ORDER — TIZANIDINE 2 MG/1
2 TABLET ORAL EVERY 8 HOURS PRN
Qty: 90 TABLET | Refills: 2 | Status: SHIPPED | OUTPATIENT
Start: 2019-09-30 | End: 2019-10-02 | Stop reason: SDUPTHER

## 2019-09-30 RX ORDER — DOXYCYCLINE HYCLATE 100 MG/1
100 CAPSULE ORAL 2 TIMES DAILY
Refills: 0 | COMMUNITY
Start: 2019-09-26 | End: 2019-10-14

## 2019-09-30 RX ORDER — PREGABALIN 100 MG/1
CAPSULE ORAL
COMMUNITY
Start: 2019-08-16 | End: 2019-11-14

## 2019-09-30 RX ORDER — NYSTATIN 100000 U/G
CREAM TOPICAL
COMMUNITY
Start: 2019-01-11 | End: 2019-11-14

## 2019-09-30 NOTE — PROGRESS NOTES
Subjective   Marlena Avilez is a 64 y.o. female.     Low back and yen. legs pain,   Lumbar Back Pain with BLLE Pain,  RLE Numbness and RLE Weakness,  Lumbar Back Pain is increased with prolonged sitting.  Patient planned Lumbar Epidural #1 with Dr. Pleitez, reports very little relief with LESIs. Pain is 9/10 at best, aching, throbbing, worse with bending and standing, interferes with ADLs, activity, failed injections, meds. MRI L-spine with laminectomy and fusion. Taking Zohydro 15mg BID with Dr. Pleitez with poor relief, Norco 10mg TID with PCP with poor relief, had good relief in past with Percocet 10mg TID prn, restarted, but less relief than Hydrocodone, worsening pain. Current patient of this clinic.         The following portions of the patient's history were reviewed and updated as appropriate: allergies, current medications, past family history, past medical history, past social history, past surgical history and problem list.    Review of Systems   Constitutional: Negative for chills, fatigue and fever.   HENT: Positive for hearing loss. Negative for trouble swallowing.    Eyes: Positive for visual disturbance.   Respiratory: Positive for shortness of breath.    Cardiovascular: Negative for chest pain.   Gastrointestinal: Negative for abdominal pain, constipation, diarrhea, nausea and vomiting.   Genitourinary: Negative for urinary incontinence.   Musculoskeletal: Negative for arthralgias, back pain, joint swelling, myalgias and neck pain.   Neurological: Positive for headache. Negative for dizziness, weakness and numbness.       Objective   Physical Exam   Constitutional: She is oriented to person, place, and time. She appears well-developed and well-nourished.   HENT:   Head: Normocephalic and atraumatic.   Eyes: EOM are normal. Pupils are equal, round, and reactive to light.   Neck: Normal range of motion.   Cardiovascular: Normal rate, regular rhythm, normal heart sounds and intact distal pulses.    Pulmonary/Chest: Breath sounds normal.   Abdominal: Soft. Bowel sounds are normal. She exhibits no distension. There is no tenderness.   Musculoskeletal:   Strength 4/5 globally   Neurological: She is alert and oriented to person, place, and time. She has normal strength and normal reflexes. She displays normal reflexes. No sensory deficit.   Psychiatric: She has a normal mood and affect. Her behavior is normal. Thought content normal.         Assessment/Plan   Marlena was seen today for back pain.    Diagnoses and all orders for this visit:    Constipation due to pain medication    Leg pain, bilateral    Cervical radiculopathy    Chronic midline low back pain with sciatica, sciatica laterality unspecified    Lumbago of lumbar region with sciatica    Lumbar radiculopathy    Neck pain        Inspect reviewed, in order. UDS 2/22/19 in order.  Stopped Zohydro, Norco. Failed Percocet 10mg TID prn which has worked well in past, cont Zanaflex 2mg qHS prn.   Begin Norco 10mg QID prn.  RTC 3 months for f/u.

## 2019-10-02 ENCOUNTER — TELEPHONE (OUTPATIENT)
Dept: PAIN MEDICINE | Facility: CLINIC | Age: 64
End: 2019-10-02

## 2019-10-02 RX ORDER — TIZANIDINE 2 MG/1
2 TABLET ORAL EVERY 8 HOURS PRN
Qty: 90 TABLET | Refills: 2 | Status: SHIPPED | OUTPATIENT
Start: 2019-10-02 | End: 2019-10-14

## 2019-10-02 NOTE — TELEPHONE ENCOUNTER
Patient wants to know if you can send something for muscle spasm,  she stated she was not able to sleep.

## 2019-10-08 RX ORDER — ATORVASTATIN CALCIUM 10 MG/1
TABLET, FILM COATED ORAL
Qty: 30 TABLET | Refills: 3 | Status: SHIPPED | OUTPATIENT
Start: 2019-10-08 | End: 2019-11-14

## 2019-10-14 ENCOUNTER — OFFICE VISIT (OUTPATIENT)
Dept: PSYCHIATRY | Facility: CLINIC | Age: 64
End: 2019-10-14

## 2019-10-14 DIAGNOSIS — F34.1 DYSTHYMIA: ICD-10-CM

## 2019-10-14 DIAGNOSIS — F51.05 INSOMNIA DUE TO MENTAL DISORDER: ICD-10-CM

## 2019-10-14 DIAGNOSIS — F41.1 GENERALIZED ANXIETY DISORDER: Primary | ICD-10-CM

## 2019-10-14 PROCEDURE — 99213 OFFICE O/P EST LOW 20 MIN: CPT | Performed by: PHYSICIAN ASSISTANT

## 2019-10-14 RX ORDER — ALPRAZOLAM 0.5 MG/1
0.5 TABLET ORAL 2 TIMES DAILY PRN
Qty: 60 TABLET | Refills: 2 | Status: SHIPPED | OUTPATIENT
Start: 2019-10-14 | End: 2020-01-15

## 2019-10-14 RX ORDER — ICOSAPENT ETHYL 1000 MG/1
2 CAPSULE ORAL DAILY
COMMUNITY
Start: 2019-10-04 | End: 2022-12-05 | Stop reason: ALTCHOICE

## 2019-10-14 RX ORDER — BUPROPION HYDROCHLORIDE 150 MG/1
150 TABLET ORAL EVERY MORNING
Qty: 30 TABLET | Refills: 3 | Status: SHIPPED | OUTPATIENT
Start: 2019-10-14 | End: 2020-03-23

## 2019-10-14 RX ORDER — VENLAFAXINE HYDROCHLORIDE 150 MG/1
CAPSULE, EXTENDED RELEASE ORAL
Qty: 60 CAPSULE | Refills: 3 | Status: SHIPPED | OUTPATIENT
Start: 2019-10-14 | End: 2019-11-14

## 2019-10-14 NOTE — PROGRESS NOTES
Subjective   Marlena Avilez is a 64 y.o.white female who presents today for follow up    Chief Complaint:  Depression and anxiety    History of Present Illness:   She missed her last appt and has not had Klonopin for a month, last visit March 2019  She is still in Pain management, rx for hyrdocodone QID but she says she only takes it BID  She ran out of the Wellbutrin since she did not follow up and did not get to increase it.  Sleeping ok  Depression 6/10  Anxiety 6/10  No SI/HI    The following portions of the patient's history were reviewed and updated as appropriate: allergies, current medications, past family history, past medical history, past social history, past surgical history and problem list.    PAST PSYCHIATRIC HISTORY  Axis I  Affective/Bipoloar Disorder, Anxiety/Panic Disorder  Axis II  None    PAST OUTPATIENT TREATMENT  Diagnosis treated:  Affective Disorder, Anxiety/Panic Disorder  Treatment Type:  Individual Therapy, Medication Management  Prior Psychiatric Medications:  Vistaril  Effexor  Abilify  Trazodone  Clonazepam  nepport Groups:  None  Sequelae Of Mental Disorder:  social isolation, emotional distress          Interval History  No Change    Side Effects  None      Past Medical History:  Past Medical History:   Diagnosis Date   • Anemia    • Anxiety disorder    • Arthritis    • Atrial fibrillation (CMS/HCC)     Proxysmal   • Chronic kidney disease    • COPD (chronic obstructive pulmonary disease) (CMS/HCC)    • Dark stools    • Depression    • Disease of thyroid gland    • GERD (gastroesophageal reflux disease)    • History of hernia repair    • Hyperlipidemia    • Hypertension    • IBS (irritable bowel syndrome)    • Liver disease    • Low back pain    • Obesity    • Seizure disorder (CMS/HCC)    • Type 2 diabetes mellitus (CMS/HCC)    • Weight gain        Social History:  Social History     Socioeconomic History   • Marital status: Single     Spouse name: Not on file   • Number of  children: Not on file   • Years of education: Not on file   • Highest education level: Not on file   Tobacco Use   • Smoking status: Current Every Day Smoker     Packs/day: 1.00   • Smokeless tobacco: Never Used   • Tobacco comment: Passive Smoke: N   Substance and Sexual Activity   • Alcohol use: No     Frequency: Never   • Drug use: Defer   • Sexual activity: Defer       Family History:  Family History   Problem Relation Age of Onset   • Cancer Mother         Other Cancer   • Heart disease Mother    • Hypertension Mother    • Stroke Mother    • Hypertension Father    • Kidney disease Father    • Diabetes Paternal Uncle    • Cancer Paternal Uncle         Colon Cancer       Past Surgical History:  Past Surgical History:   Procedure Laterality Date   • BACK SURGERY     • CARDIAC CATHETERIZATION      Abstraction from [facility]: [facility]? 1980's, 3-16   • CHOLECYSTECTOMY     • ENDOSCOPY  03/31/2015    Normal   • HERNIA REPAIR  01/19/2016    Dr Mota   • HYSTERECTOMY     • TONSILLECTOMY         Problem List:  Patient Active Problem List   Diagnosis   • Dysthymia   • Generalized anxiety disorder   • Arthralgia of left knee   • Lumbar spondylitis (CMS/HCC)   • Constipation due to pain medication   • Diabetic neuropathy (CMS/HCC)   • Leg pain, bilateral   • Cervical radiculopathy   • Low back pain   • Lumbago of lumbar region with sciatica   • Lumbar radiculopathy   • Spinal stenosis of lumbar region   • Lumbar spondylosis   • Neck pain   • Other long term (current) drug therapy   • Abnormal cardiovascular stress test   • Atrial fibrillation (CMS/HCC)   • Chronic obstructive pulmonary disease (CMS/HCC)   • Congenital coronary artery fistula   • Congestive heart failure (CMS/HCC)   • Current every day smoker   • Fibromyositis   • Esophageal stricture   • Generalized abdominal pain   • History of tracheostomy   • Hyperlipidemia   • Hypertension   • Hypothyroidism   • Insomnia due to mental disorder   • Iron deficiency  anemia   • Myofascial muscle pain   • Nausea   • Morbid obesity (CMS/HCC)   • Palpitations   • Renal insufficiency   • S/P lumbar fusion   • Seasonal allergies   • Symptom referrable to nervous system   • Tobacco abuse counseling   • Tobacco dependence syndrome   • Type 2 diabetes mellitus (CMS/HCC)   • Upper respiratory tract infection   • Vitamin D deficiency   • Chronic back pain   • Depression   • Bronchitis   • Chest pain       Allergy:   Allergies   Allergen Reactions   • Adhesive Tape Rash     Paper tape causes the rash   • Contrast Dye GI Intolerance   • Iodinated Diagnostic Agents Nausea Only   • Latex Rash   • Penicillins Hives   • Statins Rash     BAD LEG CRAMPS     • Sulfa Antibiotics Hives        Discontinued Medications:  Medications Discontinued During This Encounter   Medication Reason   • clonazePAM (KlonoPIN) 0.5 MG tablet Alternate therapy   • doxycycline (VIBRAMYCIN) 100 MG capsule *Therapy completed   • pregabalin (LYRICA) 100 MG capsule Duplicate order   • promethazine (PHENERGAN) 25 MG tablet *Therapy completed   • promethazine (PHENERGAN) 25 MG tablet *Therapy completed   • SITagliptin-metFORMIN HCl ER (JANUMET XR)  MG tablet Duplicate order   • neomycin-polymyxin-hydrocortisone (CORTISPORIN) 3.5-37773-7 otic solution    • rOPINIRole (REQUIP) 0.5 MG tablet *Therapy completed   • tiZANidine (ZANAFLEX) 2 MG tablet *Therapy completed   • tiZANidine (ZANAFLEX) 2 MG tablet *Therapy completed   • venlafaxine XR (EFFEXOR-XR) 150 MG 24 hr capsule Reorder       Current Medications:   Current Outpatient Medications   Medication Sig Dispense Refill   • traZODone (DESYREL) 100 MG tablet Take 100 mg by mouth Every Night.     • venlafaxine XR (EFFEXOR-XR) 150 MG 24 hr capsule Take two capsules daily 60 capsule 3   • albuterol (PROVENTIL) (2.5 MG/3ML) 0.083% nebulizer solution USE 1 VIAL IN MINI-NEBULIZER EVERY FOUR HOURS AS NEEDED FOR WHEEZING     • ALPRAZolam (XANAX) 0.5 MG tablet Take 1 tablet by  mouth 2 (Two) Times a Day As Needed for Anxiety. 60 tablet 2   • ammonium lactate (LAC-HYDRIN) 12 % lotion Apply  topically to the appropriate area as directed.     • atorvastatin (LIPITOR) 10 MG tablet TAKE ONE TABLET BY MOUTH DAILY 30 tablet 3   • buPROPion XL (WELLBUTRIN XL) 150 MG 24 hr tablet Take 1 tablet by mouth Every Morning. 30 tablet 3   • cephalexin (KEFLEX) 500 MG capsule TAKE 1 CAPSULE BY MOUTH 3 (THREE) TIMES DAILY FOR 10 DAYS.  0   • cetirizine (ALL DAY ALLERGY) 10 MG tablet TAKE ONE TABLET BY MOUTH EVERY DAY AS NEEDED FOR ALLERGIES/RHINITIS     • cimetidine (TAGAMET) 400 MG tablet TAKE ONE TABLET BY MOUTH TWICE DAILY AS NEEDED (NOT DISPILL)  3   • cyclobenzaprine (FLEXERIL) 10 MG tablet TAKE ONE BY MOUTH THREE TIMES PER DAY AS NEEDED FOR MUSCLE SPASM  0   • diclofenac (VOLTAREN) 75 MG EC tablet Take 75 mg by mouth 2 (Two) Times a Day.  0   • fluticasone (FLONASE) 50 MCG/ACT nasal spray 2 sprays by Each Nare route Daily.  11   • fluticasone-salmeterol (ADVAIR) 250-50 MCG/DOSE DISKUS Inhale 1 puff.     • furosemide (LASIX) 40 MG tablet TAKE ONE TABLET BY MOUTH EVERY DAY     • furosemide (LASIX) 40 MG tablet TAKE ONE TABLET BY MOUTH EVERY DAY     • HYDROcodone-acetaminophen (NORCO)  MG per tablet Take 1 tablet by mouth Every 6 (Six) Hours As Needed for Moderate Pain . 120 tablet 0   • icosapent ethyl (VASCEPA) 1 g capsule capsule Take 2 g by mouth.     • LANCETS ULTRA THIN 30G [facility] LANCETS ULTRA THIN 30G     • levothyroxine (SYNTHROID, LEVOTHROID) 50 MCG tablet TAKE ONE TABLET BY MOUTH EVERY DAY     • lubiprostone (AMITIZA) 24 MCG capsule Take 24 mcg by mouth.     • metoprolol tartrate (LOPRESSOR) 25 MG tablet TAKE ONE TABLET BY MOUTH TWICE DAILY     • MULTIPLE VITAMINS ESSENTIAL PO Take 1 tablet by mouth Daily.     • Multiple Vitamins-Minerals (EQ COMPLETE MULTIVIT ADULT 50+) tablet EQ COMPLETE MULTIVIT ADULT 50+ TABS     • neomycin-polymyxin-hydrocortisone (CORTISPORIN) 3.5-20737-3 otic  solution Administer 3 drops into ear(s) as directed by provider 3 (Three) Times a Day.     • nitroglycerin (NITROSTAT) 0.4 MG SL tablet DISSOLVE 1 TABLET UNDER TONGUE EVERY 5 MINUTES AS NEEDED FOR CHEST PAIN NO MORE THAN 3 TABLETS  3   • NOVOLOG FLEXPEN 100 UNIT/ML solution pen-injector sc pen INJECT 5 UNITS INTO SKIN THREE TIMES A DAY BEFORE MEALS  3   • nystatin (MYCOSTATIN) 851519 UNIT/GM cream Apply  topically to the appropriate area as directed.     • pantoprazole (PROTONIX) 40 MG EC tablet Take 40 mg by mouth Daily.     • pregabalin (LYRICA) 100 MG capsule TAKE 1 CAPSULE BY MOUTH TWICE DAILY     • SITagliptin-metFORMIN HCl ER (JANUMET XR)  MG tablet TAKE 2 TABLETS BY MOUTH DAILY WITH DINNER *PT REQUEST AM/PM DUE TO SWALLOWING TROUBLE*     • TRESIBA FLEXTOUCH 100 UNIT/ML solution pen-injector injection INJECT 14 UNITS SUB-Q DAILY (ACTUAL D/S 107)  5   • UNIFINE PENTIPS 32G X 4 MM misc USE 3 TIMES DAILY WITH INSULIN INJECTIONS DX E11.65  12     No current facility-administered medications for this visit.          Review of Symptoms:    Psychiatric/Behavioral: Negative for agitation, behavioral problems, confusion, decreased concentration, hallucinations, self-injury, sleep disturbance and suicidal ideas. The patient is  nervous/anxious with dysphoric mood and is not hyperactive.        Physical Exam:   There were no vitals taken for this visit.    Mental Status Exam:   Hygiene:   good  Cooperation:  Cooperative  Eye Contact:  Good  Psychomotor Behavior:  Slow  Affect:  Blunted  Mood: depressed  Hopelessness: Denies  Speech:  Normal  Thought Process:  Goal directed  Thought Content:  Normal  Suicidal:  None  Homicidal:  None  Hallucinations:  None  Delusion:  None  Memory:  Intact  Orientation:  Person, Place, Time and Situation  Reliability:  good  Insight:  Good  Judgement:  Good  Impulse Control:  Good  Physical/Medical Issues:  No        PHQ-9 Depression Screening  Little interest or pleasure in doing  things? 2   Feeling down, depressed, or hopeless? 2   Trouble falling or staying asleep, or sleeping too much? 2   Feeling tired or having little energy? 2   Poor appetite or overeating? 1   Feeling bad about yourself - or that you are a failure or have let yourself or your family down? 1   Trouble concentrating on things, such as reading the newspaper or watching television? 1   Moving or speaking so slowly that other people could have noticed? Or the opposite - being so fidgety or restless that you have been moving around a lot more than usual? 1   Thoughts that you would be better off dead, or of hurting yourself in some way? 0   PHQ-9 Total Score 12   If you checked off any problems, how difficult have these problems made it for you to do your work, take care of things at home, or get along with other people? Very difficult           Current every day smoker less than 3 minutes spent counseling Will try to cut down    I advised Marlena of the risks of tobacco use.     Lab Results:   No visits with results within 3 Month(s) from this visit.   Latest known visit with results is:   Hospital Outpatient Visit on 05/29/2019   Component Date Value Ref Range Status   • Total Cholesterol 05/29/2019 191  <200 mg/dL Final   • Triglycerides 05/29/2019 285* <150 mg/dL Final   • HDL Cholesterol 05/29/2019 40  >39 mg/dL Final   • LDL Cholesterol  05/29/2019 92  0 - 100 mg/dL Final   • VLDL Cholesterol 05/29/2019 59.4* <21 mg/dL Final   • LDL/HDL Ratio 05/29/2019 2.3   Final   • Chol/HDL Ratio 05/29/2019 4.8   Final   • Lipid Interpretation 05/29/2019 (SEE BELOW)   Final    Comment:   The following guidelines have been recommended by the NCEP for Total  Cholesterol, Total Triglycerides, LDL Cholesterol,and HDL Cholesterol:    TOTAL CHOLESTEROL                    HDL CHOLESTEROL   Desirable:            <200 mg/dL     Low HDL:            <40 mg/dL   Borderline High:   200-239 mg/dL     Normal:           40-60 mg/dL   High:            > or = 240 mg/dL     Desirable:          >60 mg/dL    TOTAL TRIGLYCERIDE                   LDL CHOLESTEROL   Normal:               <150 mg/dL     Optimal:           <100 mg/dL   Borderline High:   150-199 mg/dL     Low Risk:       100-129 mg/dL   High:              200-499 mg/dL     Borderline High:130-159 mg/dL   Very High:      > or = 500 mg/dL     High:           160-189 mg/dL                                        Very High:   > or = 190 mg/dL    The following ratios of LDL to HDL and Total Cholesterol to HDL are  for information only:    LDL/HDL RATIO                        TOTAL CHOLESTEROL/HDL RATIO   Desirable:              <5                                      Low Risk:       3.3-4.4    Optimal:        < or = 3.5           Average Risk:   4.4-7.1                                         Medium Risk:    7.1-11                                          High Risk:         >11         • Sodium 05/29/2019 138  136 - 144 mmol/L Final   • Potassium 05/29/2019 4.2  3.6 - 5.1 mmol/L Final   • Chloride 05/29/2019 99* 101 - 111 mmol/L Final   • CO2 05/29/2019 25  22 - 32 mmol/L Final   • Glucose 05/29/2019 186* 65 - 99 mg/dL Final   • BUN 05/29/2019 14  8 - 20 mg/dL Final   • Creatinine 05/29/2019 1.1* 0.4 - 1.0 mg/dl Final   • Calcium 05/29/2019 9.3  8.9 - 10.3 mg/dL Final   • Total Protein 05/29/2019 6.9  6.1 - 7.9 g/dL Final   • Albumin 05/29/2019 3.6  3.5 - 4.8 g/dL Final   • Total Bilirubin 05/29/2019 0.4  0.3 - 1.2 mg/dL Final   • Alkaline Phosphatase 05/29/2019 96* 32 - 91 IU/L Final   • AST (SGOT) 05/29/2019 31  15 - 41 IU/L Final   • ALT (SGPT) 05/29/2019 21  14 - 54 IU/L Final   • Anion Gap 05/29/2019 18.2  10 - 20 Final   • BUN/Creatinine Ratio 05/29/2019 12.7  5.4 - 26.2 Final   • GFR MDRD Non  05/29/2019 50* >60 mL/min/1.73m2 Final   • GFR MDRD  05/29/2019 >60  >60 mL/min/1.73m2 Final   • Globulin 05/29/2019 3.3  2.5 - 3.8 G/dL Final   • A/G Ratio 05/29/2019 1.1  1.0 - 1.7  Final   • TSH 05/29/2019 1.73  0.34 - 5.60 uIU/mL Final    Comment: (SEE BELOW)  NOTE: Results may be falsely decreased if patient taking Biotin     • Hemoglobin A1C 05/29/2019 7.3* 0 - 5.6 % Final    Comment: (SEE BELOW)       Range          Diabetic Status  __________________________________________      <5.7 %              Normal   5.7 - 6.4 %   Increased risk for Diabetes      >6.4 %             Diabetes    These guidelines have been recommended by the  American Diabetic Association for Hgb A1c.         Assessment/Plan   Problems Addressed this Visit        Other    Dysthymia    Relevant Medications    ALPRAZolam (XANAX) 0.5 MG tablet    venlafaxine XR (EFFEXOR-XR) 150 MG 24 hr capsule    buPROPion XL (WELLBUTRIN XL) 150 MG 24 hr tablet    Generalized anxiety disorder - Primary    Relevant Medications    ALPRAZolam (XANAX) 0.5 MG tablet    venlafaxine XR (EFFEXOR-XR) 150 MG 24 hr capsule    buPROPion XL (WELLBUTRIN XL) 150 MG 24 hr tablet    Insomnia due to mental disorder    Relevant Medications    ALPRAZolam (XANAX) 0.5 MG tablet    venlafaxine XR (EFFEXOR-XR) 150 MG 24 hr capsule    buPROPion XL (WELLBUTRIN XL) 150 MG 24 hr tablet          Visit Diagnoses:    ICD-10-CM ICD-9-CM   1. Generalized anxiety disorder F41.1 300.02   2. Dysthymia F34.1 300.4   3. Insomnia due to mental disorder F51.05 300.9     327.02       TREATMENT PLAN/GOALS: Continue supportive psychotherapy efforts and medications as indicated. Treatment and medication options discussed during today's visit. Patient ackowledged and verbally consented to continue with current treatment plan and was educated on the importance of compliance with treatment and follow-up appointments.    MEDICATION ISSUES:  INSPECT reviewed as expected  Discussed medication options and treatment plan of prescribed medication as well as the risks, benefits, and side effects including potential falls, possible impaired driving and metabolic adversities among others.  Patient is agreeable to call the office with any worsening of symptoms or onset of side effects. Patient is agreeable to call 911 or go to the nearest ER should he/she begin having SI/HI. No medication side effects or related complaints today.     Continue Trazodone, Effexor, Klonopin and Abilify at current dosages.  Restart Wellbutrin XL 150mg and will increase to 300mg in a month or so if needed.    MEDS ORDERED DURING VISIT:  New Medications Ordered This Visit   Medications   • ALPRAZolam (XANAX) 0.5 MG tablet     Sig: Take 1 tablet by mouth 2 (Two) Times a Day As Needed for Anxiety.     Dispense:  60 tablet     Refill:  2   • venlafaxine XR (EFFEXOR-XR) 150 MG 24 hr capsule     Sig: Take two capsules daily     Dispense:  60 capsule     Refill:  3     Generic For:*EFFEXOR XR   150MG  07/12/2019 3:53:19 PM   • buPROPion XL (WELLBUTRIN XL) 150 MG 24 hr tablet     Sig: Take 1 tablet by mouth Every Morning.     Dispense:  30 tablet     Refill:  3       Return in about 3 months (around 1/14/2020).         This document has been electronically signed by Erin Avilez PA-C  October 16, 2019 11:10 PM

## 2019-10-24 ENCOUNTER — TELEPHONE (OUTPATIENT)
Dept: PSYCHIATRY | Facility: CLINIC | Age: 64
End: 2019-10-24
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
## 2019-10-24 NOTE — TELEPHONE ENCOUNTER
If she is really shaken up, she may want to go to [facility] so they can help medicate her through it because I cannot give her anymore Xanax with her being on pain medication at home.  [facility] may be able to calm her with close supervision.  You can move her appt with up to a sooner date if you can find one available.  If she does not go inpatient, I would advise that she immediately get into counseling.

## 2019-10-29 ENCOUNTER — OFFICE VISIT (OUTPATIENT)
Dept: PSYCHIATRY | Facility: CLINIC | Age: 64
End: 2019-10-29

## 2019-10-29 DIAGNOSIS — F51.05 INSOMNIA DUE TO MENTAL DISORDER: Primary | ICD-10-CM

## 2019-10-29 PROCEDURE — 99213 OFFICE O/P EST LOW 20 MIN: CPT | Performed by: PHYSICIAN ASSISTANT

## 2019-10-29 RX ORDER — PRAZOSIN HYDROCHLORIDE 1 MG/1
CAPSULE ORAL
Qty: 30 CAPSULE | Refills: 2 | Status: SHIPPED | OUTPATIENT
Start: 2019-10-29 | End: 2019-12-30

## 2019-10-29 RX ORDER — TRAZODONE HYDROCHLORIDE 100 MG/1
TABLET ORAL
Qty: 60 TABLET | Refills: 2 | Status: SHIPPED | OUTPATIENT
Start: 2019-10-29 | End: 2019-12-30
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
## 2019-10-30 RX ORDER — PEN NEEDLE, DIABETIC 32GX 5/32"
NEEDLE, DISPOSABLE MISCELLANEOUS
Qty: 100 EACH | Refills: 1 | Status: SHIPPED | OUTPATIENT
Start: 2019-10-30 | End: 2019-11-14

## 2019-11-06 ENCOUNTER — OFFICE (AMBULATORY)
Dept: URBAN - METROPOLITAN AREA CLINIC 64 | Facility: CLINIC | Age: 64
End: 2019-11-06

## 2019-11-06 VITALS
SYSTOLIC BLOOD PRESSURE: 131 MMHG | HEART RATE: 79 BPM | HEIGHT: 63 IN | DIASTOLIC BLOOD PRESSURE: 86 MMHG | WEIGHT: 189 LBS

## 2019-11-06 DIAGNOSIS — R10.31 RIGHT LOWER QUADRANT PAIN: ICD-10-CM

## 2019-11-06 DIAGNOSIS — K59.00 CONSTIPATION, UNSPECIFIED: ICD-10-CM

## 2019-11-06 DIAGNOSIS — R13.10 DYSPHAGIA, UNSPECIFIED: ICD-10-CM

## 2019-11-06 PROCEDURE — 99213 OFFICE O/P EST LOW 20 MIN: CPT | Performed by: NURSE PRACTITIONER

## 2019-11-06 RX ORDER — LACTULOSE 10 G/15ML
600 SOLUTION ORAL
Qty: 1800 | Refills: 11 | Status: COMPLETED
Start: 2019-11-06 | End: 2020-10-08

## 2019-11-12 ENCOUNTER — APPOINTMENT (OUTPATIENT)
Dept: CT IMAGING | Facility: HOSPITAL | Age: 64
End: 2019-11-12

## 2019-11-12 ENCOUNTER — HOSPITAL ENCOUNTER (EMERGENCY)
Facility: HOSPITAL | Age: 64
Discharge: HOME | End: 2019-11-12
Attending: EMERGENCY MEDICINE | Admitting: EMERGENCY MEDICINE

## 2019-11-12 VITALS
RESPIRATION RATE: 18 BRPM | BODY MASS INDEX: 32.63 KG/M2 | HEIGHT: 64 IN | OXYGEN SATURATION: 92 % | HEART RATE: 96 BPM | TEMPERATURE: 98 F | WEIGHT: 191.14 LBS | SYSTOLIC BLOOD PRESSURE: 108 MMHG | DIASTOLIC BLOOD PRESSURE: 52 MMHG

## 2019-11-12 DIAGNOSIS — R10.9 ABDOMINAL PAIN, UNSPECIFIED ABDOMINAL LOCATION: Primary | ICD-10-CM

## 2019-11-12 DIAGNOSIS — R33.9 URINARY RETENTION: ICD-10-CM

## 2019-11-12 LAB
ANION GAP SERPL CALCULATED.3IONS-SCNC: 14 MMOL/L (ref 5–15)
BASOPHILS # BLD AUTO: 0.1 10*3/MM3 (ref 0–0.2)
BASOPHILS NFR BLD AUTO: 1.2 % (ref 0–1.5)
BILIRUB UR QL STRIP: NEGATIVE
BUN BLD-MCNC: 23 MG/DL (ref 8–23)
BUN/CREAT SERPL: 21.5 (ref 7–25)
CALCIUM SPEC-SCNC: 10 MG/DL (ref 8.6–10.5)
CHLORIDE SERPL-SCNC: 96 MMOL/L (ref 98–107)
CLARITY UR: CLEAR
CO2 SERPL-SCNC: 27 MMOL/L (ref 22–29)
COLOR UR: YELLOW
CREAT BLD-MCNC: 1.07 MG/DL (ref 0.57–1)
DEPRECATED RDW RBC AUTO: 42 FL (ref 37–54)
EOSINOPHIL # BLD AUTO: 0.4 10*3/MM3 (ref 0–0.4)
EOSINOPHIL NFR BLD AUTO: 3.4 % (ref 0.3–6.2)
ERYTHROCYTE [DISTWIDTH] IN BLOOD BY AUTOMATED COUNT: 13.2 % (ref 12.3–15.4)
GFR SERPL CREATININE-BSD FRML MDRD: 52 ML/MIN/1.73
GLUCOSE BLD-MCNC: 111 MG/DL (ref 65–99)
GLUCOSE UR STRIP-MCNC: NEGATIVE MG/DL
HCT VFR BLD AUTO: 41.4 % (ref 34–46.6)
HGB BLD-MCNC: 14.4 G/DL (ref 12–15.9)
HGB UR QL STRIP.AUTO: NEGATIVE
KETONES UR QL STRIP: NEGATIVE
LEUKOCYTE ESTERASE UR QL STRIP.AUTO: NEGATIVE
LYMPHOCYTES # BLD AUTO: 3.8 10*3/MM3 (ref 0.7–3.1)
LYMPHOCYTES NFR BLD AUTO: 35.2 % (ref 19.6–45.3)
MCH RBC QN AUTO: 31.4 PG (ref 26.6–33)
MCHC RBC AUTO-ENTMCNC: 34.9 G/DL (ref 31.5–35.7)
MCV RBC AUTO: 90.2 FL (ref 79–97)
MONOCYTES # BLD AUTO: 0.8 10*3/MM3 (ref 0.1–0.9)
MONOCYTES NFR BLD AUTO: 7.8 % (ref 5–12)
NEUTROPHILS # BLD AUTO: 5.6 10*3/MM3 (ref 1.7–7)
NEUTROPHILS NFR BLD AUTO: 52.4 % (ref 42.7–76)
NITRITE UR QL STRIP: NEGATIVE
NRBC BLD AUTO-RTO: 0.1 /100 WBC (ref 0–0.2)
PH UR STRIP.AUTO: <=5 [PH] (ref 5–8)
PLATELET # BLD AUTO: 223 10*3/MM3 (ref 140–450)
PMV BLD AUTO: 9.1 FL (ref 6–12)
POTASSIUM BLD-SCNC: 4.1 MMOL/L (ref 3.5–5.2)
PROT UR QL STRIP: NEGATIVE
RBC # BLD AUTO: 4.59 10*6/MM3 (ref 3.77–5.28)
SODIUM BLD-SCNC: 137 MMOL/L (ref 136–145)
SP GR UR STRIP: 1.01 (ref 1–1.03)
UROBILINOGEN UR QL STRIP: NORMAL
WBC NRBC COR # BLD: 10.7 10*3/MM3 (ref 3.4–10.8)

## 2019-11-12 PROCEDURE — 96374 THER/PROPH/DIAG INJ IV PUSH: CPT

## 2019-11-12 PROCEDURE — 81003 URINALYSIS AUTO W/O SCOPE: CPT | Performed by: EMERGENCY MEDICINE

## 2019-11-12 PROCEDURE — 74176 CT ABD & PELVIS W/O CONTRAST: CPT

## 2019-11-12 PROCEDURE — 96375 TX/PRO/DX INJ NEW DRUG ADDON: CPT

## 2019-11-12 PROCEDURE — 25010000002 HYDROMORPHONE PER 4 MG: Performed by: EMERGENCY MEDICINE

## 2019-11-12 PROCEDURE — P9612 CATHETERIZE FOR URINE SPEC: HCPCS

## 2019-11-12 PROCEDURE — 99283 EMERGENCY DEPT VISIT LOW MDM: CPT

## 2019-11-12 PROCEDURE — 80048 BASIC METABOLIC PNL TOTAL CA: CPT | Performed by: EMERGENCY MEDICINE

## 2019-11-12 PROCEDURE — 25010000002 ONDANSETRON PER 1 MG: Performed by: EMERGENCY MEDICINE

## 2019-11-12 PROCEDURE — 85025 COMPLETE CBC W/AUTO DIFF WBC: CPT | Performed by: EMERGENCY MEDICINE

## 2019-11-12 RX ORDER — ONDANSETRON 2 MG/ML
4 INJECTION INTRAMUSCULAR; INTRAVENOUS ONCE
Status: COMPLETED | OUTPATIENT
Start: 2019-11-12 | End: 2019-11-12

## 2019-11-12 RX ORDER — HYDROMORPHONE HCL 110MG/55ML
1 PATIENT CONTROLLED ANALGESIA SYRINGE INTRAVENOUS ONCE
Status: COMPLETED | OUTPATIENT
Start: 2019-11-12 | End: 2019-11-12

## 2019-11-12 RX ORDER — SODIUM CHLORIDE 0.9 % (FLUSH) 0.9 %
10 SYRINGE (ML) INJECTION AS NEEDED
Status: DISCONTINUED | OUTPATIENT
Start: 2019-11-12 | End: 2019-11-12 | Stop reason: HOSPADM

## 2019-11-12 RX ADMIN — HYDROMORPHONE HYDROCHLORIDE 1 MG: 2 INJECTION, SOLUTION INTRAMUSCULAR; INTRAVENOUS; SUBCUTANEOUS at 17:27

## 2019-11-12 RX ADMIN — SODIUM CHLORIDE 500 ML: 900 INJECTION, SOLUTION INTRAVENOUS at 17:27

## 2019-11-12 RX ADMIN — ONDANSETRON 4 MG: 2 INJECTION INTRAMUSCULAR; INTRAVENOUS at 17:27

## 2019-11-12 NOTE — ED NOTES
Informed primary that patient states she needs to urinate but can't.      Jody Parikh RN  11/12/19 [id number]

## 2019-11-12 NOTE — ED PROVIDER NOTES
Subjective   History of Present Illness  Abdominal pain  64-year-old female complains of moderate to severe pain in her right lower quadrant over the last 1 week.  She states she is had some loose stool she denies melena or hematochezia or fevers or chills she has had no vomiting she has had nausea.  She reports no unusual ingestions or trauma.  She reports no relieving or exacerbating factors.  Review of Systems   Constitutional: Negative.    HENT: Negative.    Eyes: Negative.    Cardiovascular: Negative.    Gastrointestinal: Positive for abdominal pain, diarrhea and nausea. Negative for vomiting.   Genitourinary: Negative.    Musculoskeletal: Negative.    Skin: Negative.    Neurological: Negative.    Psychiatric/Behavioral: Negative.        Past Medical History:   Diagnosis Date   • Anemia    • Anxiety disorder    • Arthritis    • Atrial fibrillation (CMS/HCC)     Proxysmal   • Chronic kidney disease    • COPD (chronic obstructive pulmonary disease) (CMS/HCC)    • Dark stools    • Depression    • Disease of thyroid gland    • GERD (gastroesophageal reflux disease)    • History of hernia repair    • Hyperlipidemia    • Hypertension    • IBS (irritable bowel syndrome)    • Liver disease    • Low back pain    • Obesity    • Seizure disorder (CMS/HCC)    • Type 2 diabetes mellitus (CMS/HCC)    • Weight gain        Allergies   Allergen Reactions   • Adhesive Tape Rash     Paper tape causes the rash   • Contrast Dye GI Intolerance   • Iodinated Diagnostic Agents Nausea Only   • Latex Rash   • Penicillins Hives   • Statins Rash     BAD LEG CRAMPS     • Sulfa Antibiotics Hives       Past Surgical History:   Procedure Laterality Date   • BACK SURGERY     • CARDIAC CATHETERIZATION      [facility]? 1980's, 3-16   • CHOLECYSTECTOMY     • ENDOSCOPY  03/31/2015    Normal   • HERNIA REPAIR  01/19/2016    Dr Mota   • HYSTERECTOMY     • TONSILLECTOMY         Family History   Problem Relation Age of  "Onset   • Cancer Mother         Other Cancer   • Heart disease Mother    • Hypertension Mother    • Stroke Mother    • Hypertension Father    • Kidney disease Father    • Diabetes Paternal Uncle    • Cancer Paternal Uncle         Colon Cancer       Social History     Socioeconomic History   • Marital status: Single     Spouse name: Not on file   • Number of children: Not on file   • Years of education: Not on file   • Highest education level: Not on file   Tobacco Use   • Smoking status: Current Every Day Smoker     Packs/day: 1.00   • Smokeless tobacco: Never Used   • Tobacco comment: Passive Smoke: N   Substance and Sexual Activity   • Alcohol use: No     Frequency: Never   • Drug use: Defer   • Sexual activity: Defer           Objective   Physical Exam  /84 (BP Location: Left arm, Patient Position: Sitting)   Pulse 108   Temp 99.6 °F (37.6 °C) (Oral)   Resp 20   Ht 162.6 cm (64\")   Wt 86.7 kg (191 lb 2.2 oz)   SpO2 99%   BMI 32.81 kg/m²   General: Well-developed well-appearing, no acute distress, alert and appropriate  Eyes: Pupils round and reactive, sclera nonicteric  HEENT: Mucous membranes moist, no mucosal swelling  Neck: Supple, no nuchal rigidity, no lymphadenopathy  Respirations: Respirations nonlabored, equal breath sounds bilaterally, clear lungs  Heart regular rate and rhythm, no murmurs rubs or gallops,   Abdomen soft, tender palpation right lower quadrant, no rebound or guarding, nondistended, no hepatosplenomegaly, no hernia, no mass, normal bowel sounds, no CVA tenderness  Extremities no clubbing cyanosis or edema, calves are symmetric and nontender  Neuro cranial nerves grossly intact, no focal limb deficits  Psych oriented, pleasant affect  Skin no rash, brisk cap refill  Procedures           ED Course        Results for orders placed or performed during the hospital encounter of 11/12/19   Basic Metabolic Panel   Result Value Ref Range    Glucose 111 (H) 65 - 99 mg/dL    BUN 23 8 - " 23 mg/dL    Creatinine 1.07 (H) 0.57 - 1.00 mg/dL    Sodium 137 136 - 145 mmol/L    Potassium 4.1 3.5 - 5.2 mmol/L    Chloride 96 (L) 98 - 107 mmol/L    CO2 27.0 22.0 - 29.0 mmol/L    Calcium 10.0 8.6 - 10.5 mg/dL    eGFR Non African Amer 52 (L) >60 mL/min/1.73    BUN/Creatinine Ratio 21.5 7.0 - 25.0    Anion Gap 14.0 5.0 - 15.0 mmol/L   Urinalysis With Culture If Indicated - Urine, Catheter   Result Value Ref Range    Color, UA Yellow Yellow, Straw    Appearance, UA Clear Clear    pH, UA <=5.0 5.0 - 8.0    Specific Gravity, UA 1.012 1.005 - 1.030    Glucose, UA Negative Negative    Ketones, UA Negative Negative    Bilirubin, UA Negative Negative    Blood, UA Negative Negative    Protein, UA Negative Negative    Leuk Esterase, UA Negative Negative    Nitrite, UA Negative Negative    Urobilinogen, UA 0.2 E.U./dL 0.2 - 1.0 E.U./dL   CBC Auto Differential   Result Value Ref Range    WBC 10.70 3.40 - 10.80 10*3/mm3    RBC 4.59 3.77 - 5.28 10*6/mm3    Hemoglobin 14.4 12.0 - 15.9 g/dL    Hematocrit 41.4 34.0 - 46.6 %    MCV 90.2 79.0 - 97.0 fL    MCH 31.4 26.6 - 33.0 pg    MCHC 34.9 31.5 - 35.7 g/dL    RDW 13.2 12.3 - 15.4 %    RDW-SD 42.0 37.0 - 54.0 fl    MPV 9.1 6.0 - 12.0 fL    Platelets 223 140 - 450 10*3/mm3    Neutrophil % 52.4 42.7 - 76.0 %    Lymphocyte % 35.2 19.6 - 45.3 %    Monocyte % 7.8 5.0 - 12.0 %    Eosinophil % 3.4 0.3 - 6.2 %    Basophil % 1.2 0.0 - 1.5 %    Neutrophils, Absolute 5.60 1.70 - 7.00 10*3/mm3    Lymphocytes, Absolute 3.80 (H) 0.70 - 3.10 10*3/mm3    Monocytes, Absolute 0.80 0.10 - 0.90 10*3/mm3    Eosinophils, Absolute 0.40 0.00 - 0.40 10*3/mm3    Basophils, Absolute 0.10 0.00 - 0.20 10*3/mm3    nRBC 0.1 0.0 - 0.2 /100 WBC     Ct Abdomen Pelvis Without Contrast    Result Date: 11/12/2019   1. Distention of the bladder which was present on previous remote study from October 2017 which may be chronic. Correlate with findings of bowel obstruction. A small amount of air is present within the  bladder lumen which may related to recent catheterization. Recommend clinical correlation. Otherwise, no acute process. 2. Nonvisualization of the appendix, similar as compared to the previous study which may related to previous resection. 3. Ancillary findings as described above.    Electronically Signed By-Jodee Gregory On:11/12/2019 6:03 PM This report was finalized on [id number] by  Jodee Gregory, .              MDM  Patient presents with some lower abdominal discomfort and has CT findings of some urinary bladder distention.  In and out catheter was placed and she had about 1800 cc of urine output she was able to void afterwards.  BUN and creatinine are essentially normal.  She has a benign abdominal examination on repeat examination no signs of peritonitis or acute abdomen.  She is referred to urology.  She was given warning signs for return.  Verbally she is not having symptoms of bowel obstruction.  Final diagnoses:   Abdominal pain, unspecified abdominal location   Urinary retention              Melchor Lehman MD  11/12/19 [id number]

## 2019-11-13 ENCOUNTER — HOSPITAL ENCOUNTER (OUTPATIENT)
Facility: HOSPITAL | Age: 64
Setting detail: OBSERVATION
Discharge: HOME | End: 2019-11-16
Attending: EMERGENCY MEDICINE | Admitting: HOSPITALIST

## 2019-11-13 DIAGNOSIS — N39.0 ACUTE UTI: ICD-10-CM

## 2019-11-13 DIAGNOSIS — N39.0 ACUTE UTI (URINARY TRACT INFECTION): ICD-10-CM

## 2019-11-13 DIAGNOSIS — R10.9 ABDOMINAL PAIN, UNSPECIFIED ABDOMINAL LOCATION: Primary | ICD-10-CM

## 2019-11-13 PROCEDURE — 99284 EMERGENCY DEPT VISIT MOD MDM: CPT

## 2019-11-13 PROCEDURE — 96374 THER/PROPH/DIAG INJ IV PUSH: CPT

## 2019-11-14 PROBLEM — R33.8 ACUTE URINARY RETENTION: Status: ACTIVE | Noted: 2019-11-14

## 2019-11-14 PROBLEM — R10.9 ABDOMINAL PAIN: Status: ACTIVE | Noted: 2019-11-14

## 2019-11-14 PROBLEM — N39.0 ACUTE UTI (URINARY TRACT INFECTION): Status: ACTIVE | Noted: 2019-11-14

## 2019-11-14 PROBLEM — R40.0 SOMNOLENCE: Chronic | Status: ACTIVE | Noted: 2019-11-14

## 2019-11-14 PROBLEM — Z79.899 POLYPHARMACY: Chronic | Status: ACTIVE | Noted: 2019-11-14

## 2019-11-14 LAB
ALBUMIN SERPL-MCNC: 3.9 G/DL (ref 3.5–5.2)
ALBUMIN/GLOB SERPL: 1.2 G/DL
ALP SERPL-CCNC: 114 U/L (ref 39–117)
ALT SERPL W P-5'-P-CCNC: 16 U/L (ref 1–33)
ANION GAP SERPL CALCULATED.3IONS-SCNC: 10 MMOL/L (ref 5–15)
ANION GAP SERPL CALCULATED.3IONS-SCNC: 11 MMOL/L (ref 5–15)
APTT PPP: 31.7 SECONDS (ref 24–31)
AST SERPL-CCNC: 14 U/L (ref 1–32)
BACTERIA UR QL AUTO: ABNORMAL /HPF
BASOPHILS # BLD AUTO: 0.1 10*3/MM3 (ref 0–0.2)
BASOPHILS # BLD AUTO: 0.1 10*3/MM3 (ref 0–0.2)
BASOPHILS NFR BLD AUTO: 0.9 % (ref 0–1.5)
BASOPHILS NFR BLD AUTO: 1.1 % (ref 0–1.5)
BILIRUB SERPL-MCNC: <0.2 MG/DL (ref 0.2–1.2)
BILIRUB UR QL STRIP: NEGATIVE
BUN BLD-MCNC: 22 MG/DL (ref 8–23)
BUN BLD-MCNC: 24 MG/DL (ref 8–23)
BUN/CREAT SERPL: 23.2 (ref 7–25)
BUN/CREAT SERPL: 25.8 (ref 7–25)
CALCIUM SPEC-SCNC: 9.2 MG/DL (ref 8.6–10.5)
CALCIUM SPEC-SCNC: 9.7 MG/DL (ref 8.6–10.5)
CHLORIDE SERPL-SCNC: 102 MMOL/L (ref 98–107)
CHLORIDE SERPL-SCNC: 97 MMOL/L (ref 98–107)
CLARITY UR: CLEAR
CO2 SERPL-SCNC: 30 MMOL/L (ref 22–29)
CO2 SERPL-SCNC: 31 MMOL/L (ref 22–29)
COLOR UR: YELLOW
CREAT BLD-MCNC: 0.93 MG/DL (ref 0.57–1)
CREAT BLD-MCNC: 0.95 MG/DL (ref 0.57–1)
DEPRECATED RDW RBC AUTO: 42.9 FL (ref 37–54)
DEPRECATED RDW RBC AUTO: 42.9 FL (ref 37–54)
EOSINOPHIL # BLD AUTO: 0.3 10*3/MM3 (ref 0–0.4)
EOSINOPHIL # BLD AUTO: 0.4 10*3/MM3 (ref 0–0.4)
EOSINOPHIL NFR BLD AUTO: 3.3 % (ref 0.3–6.2)
EOSINOPHIL NFR BLD AUTO: 4.5 % (ref 0.3–6.2)
ERYTHROCYTE [DISTWIDTH] IN BLOOD BY AUTOMATED COUNT: 13.2 % (ref 12.3–15.4)
ERYTHROCYTE [DISTWIDTH] IN BLOOD BY AUTOMATED COUNT: 13.3 % (ref 12.3–15.4)
GFR SERPL CREATININE-BSD FRML MDRD: 60 ML/MIN/1.73
GFR SERPL CREATININE-BSD FRML MDRD: 61 ML/MIN/1.73
GLOBULIN UR ELPH-MCNC: 3.3 GM/DL
GLUCOSE BLD-MCNC: 143 MG/DL (ref 65–99)
GLUCOSE BLD-MCNC: 144 MG/DL (ref 65–99)
GLUCOSE BLDC GLUCOMTR-MCNC: 103 MG/DL (ref 70–105)
GLUCOSE BLDC GLUCOMTR-MCNC: 117 MG/DL (ref 70–105)
GLUCOSE BLDC GLUCOMTR-MCNC: 122 MG/DL (ref 70–105)
GLUCOSE BLDC GLUCOMTR-MCNC: 137 MG/DL (ref 70–105)
GLUCOSE BLDC GLUCOMTR-MCNC: 152 MG/DL (ref 70–105)
GLUCOSE UR STRIP-MCNC: NEGATIVE MG/DL
HBA1C MFR BLD: 7.1 % (ref 3.5–5.6)
HCT VFR BLD AUTO: 36 % (ref 34–46.6)
HCT VFR BLD AUTO: 40.7 % (ref 34–46.6)
HGB BLD-MCNC: 12.3 G/DL (ref 12–15.9)
HGB BLD-MCNC: 13.5 G/DL (ref 12–15.9)
HGB UR QL STRIP.AUTO: NEGATIVE
HYALINE CASTS UR QL AUTO: ABNORMAL /LPF
INR PPP: 0.92 (ref 0.9–1.1)
KETONES UR QL STRIP: NEGATIVE
LEUKOCYTE ESTERASE UR QL STRIP.AUTO: ABNORMAL
LIPASE SERPL-CCNC: 38 U/L (ref 13–60)
LYMPHOCYTES # BLD AUTO: 3.4 10*3/MM3 (ref 0.7–3.1)
LYMPHOCYTES # BLD AUTO: 3.8 10*3/MM3 (ref 0.7–3.1)
LYMPHOCYTES NFR BLD AUTO: 37.3 % (ref 19.6–45.3)
LYMPHOCYTES NFR BLD AUTO: 40.8 % (ref 19.6–45.3)
MCH RBC QN AUTO: 30.3 PG (ref 26.6–33)
MCH RBC QN AUTO: 31.3 PG (ref 26.6–33)
MCHC RBC AUTO-ENTMCNC: 33.2 G/DL (ref 31.5–35.7)
MCHC RBC AUTO-ENTMCNC: 34.2 G/DL (ref 31.5–35.7)
MCV RBC AUTO: 91.3 FL (ref 79–97)
MCV RBC AUTO: 91.6 FL (ref 79–97)
MONOCYTES # BLD AUTO: 0.7 10*3/MM3 (ref 0.1–0.9)
MONOCYTES # BLD AUTO: 0.9 10*3/MM3 (ref 0.1–0.9)
MONOCYTES NFR BLD AUTO: 8.5 % (ref 5–12)
MONOCYTES NFR BLD AUTO: 8.6 % (ref 5–12)
NEUTROPHILS # BLD AUTO: 3.8 10*3/MM3 (ref 1.7–7)
NEUTROPHILS # BLD AUTO: 5 10*3/MM3 (ref 1.7–7)
NEUTROPHILS NFR BLD AUTO: 45.3 % (ref 42.7–76)
NEUTROPHILS NFR BLD AUTO: 49.7 % (ref 42.7–76)
NITRITE UR QL STRIP: POSITIVE
NRBC BLD AUTO-RTO: 0.1 /100 WBC (ref 0–0.2)
NRBC BLD AUTO-RTO: 0.1 /100 WBC (ref 0–0.2)
PH UR STRIP.AUTO: 5.5 [PH] (ref 5–8)
PLATELET # BLD AUTO: 206 10*3/MM3 (ref 140–450)
PLATELET # BLD AUTO: 221 10*3/MM3 (ref 140–450)
PMV BLD AUTO: 9.1 FL (ref 6–12)
PMV BLD AUTO: 9.4 FL (ref 6–12)
POTASSIUM BLD-SCNC: 4.1 MMOL/L (ref 3.5–5.2)
POTASSIUM BLD-SCNC: 4.3 MMOL/L (ref 3.5–5.2)
PROT SERPL-MCNC: 7.2 G/DL (ref 6–8.5)
PROT UR QL STRIP: NEGATIVE
PROTHROMBIN TIME: 9.8 SECONDS (ref 9.6–11.7)
RBC # BLD AUTO: 3.93 10*6/MM3 (ref 3.77–5.28)
RBC # BLD AUTO: 4.46 10*6/MM3 (ref 3.77–5.28)
RBC # UR: ABNORMAL /HPF
REF LAB TEST METHOD: ABNORMAL
SODIUM BLD-SCNC: 138 MMOL/L (ref 136–145)
SODIUM BLD-SCNC: 143 MMOL/L (ref 136–145)
SP GR UR STRIP: 1.01 (ref 1–1.03)
SQUAMOUS #/AREA URNS HPF: ABNORMAL /HPF
UROBILINOGEN UR QL STRIP: ABNORMAL
WBC NRBC COR # BLD: 10.1 10*3/MM3 (ref 3.4–10.8)
WBC NRBC COR # BLD: 8.5 10*3/MM3 (ref 3.4–10.8)
WBC UR QL AUTO: ABNORMAL /HPF

## 2019-11-14 PROCEDURE — 96374 THER/PROPH/DIAG INJ IV PUSH: CPT

## 2019-11-14 PROCEDURE — 85025 COMPLETE CBC W/AUTO DIFF WBC: CPT | Performed by: EMERGENCY MEDICINE

## 2019-11-14 PROCEDURE — 63710000001 INSULIN GLARGINE PER 5 UNITS: Performed by: NURSE PRACTITIONER

## 2019-11-14 PROCEDURE — 83690 ASSAY OF LIPASE: CPT | Performed by: EMERGENCY MEDICINE

## 2019-11-14 PROCEDURE — 25010000002 CEFTRIAXONE IN SWFI 1 GRAM/10ML IV PUSH SYRINGE (SIMPLE): Performed by: EMERGENCY MEDICINE

## 2019-11-14 PROCEDURE — G0378 HOSPITAL OBSERVATION PER HR: HCPCS

## 2019-11-14 PROCEDURE — 94640 AIRWAY INHALATION TREATMENT: CPT

## 2019-11-14 PROCEDURE — 87186 SC STD MICRODIL/AGAR DIL: CPT | Performed by: INTERNAL MEDICINE

## 2019-11-14 PROCEDURE — 87088 URINE BACTERIA CULTURE: CPT | Performed by: INTERNAL MEDICINE

## 2019-11-14 PROCEDURE — 99220 PR INITIAL OBSERVATION CARE/DAY 70 MINUTES: CPT | Performed by: INTERNAL MEDICINE

## 2019-11-14 PROCEDURE — 85730 THROMBOPLASTIN TIME PARTIAL: CPT | Performed by: EMERGENCY MEDICINE

## 2019-11-14 PROCEDURE — 63710000001 INSULIN LISPRO (HUMAN) PER 5 UNITS: Performed by: NURSE PRACTITIONER

## 2019-11-14 PROCEDURE — 25010000002 ONDANSETRON PER 1 MG: Performed by: NURSE PRACTITIONER

## 2019-11-14 PROCEDURE — 96361 HYDRATE IV INFUSION ADD-ON: CPT

## 2019-11-14 PROCEDURE — 96376 TX/PRO/DX INJ SAME DRUG ADON: CPT

## 2019-11-14 PROCEDURE — 82962 GLUCOSE BLOOD TEST: CPT

## 2019-11-14 PROCEDURE — 94799 UNLISTED PULMONARY SVC/PX: CPT

## 2019-11-14 PROCEDURE — 81001 URINALYSIS AUTO W/SCOPE: CPT | Performed by: EMERGENCY MEDICINE

## 2019-11-14 PROCEDURE — 25010000002 MORPHINE PER 10 MG: Performed by: EMERGENCY MEDICINE

## 2019-11-14 PROCEDURE — 85025 COMPLETE CBC W/AUTO DIFF WBC: CPT | Performed by: NURSE PRACTITIONER

## 2019-11-14 PROCEDURE — 85610 PROTHROMBIN TIME: CPT | Performed by: EMERGENCY MEDICINE

## 2019-11-14 PROCEDURE — 96375 TX/PRO/DX INJ NEW DRUG ADDON: CPT

## 2019-11-14 PROCEDURE — 96365 THER/PROPH/DIAG IV INF INIT: CPT

## 2019-11-14 PROCEDURE — 25010000002 ONDANSETRON PER 1 MG: Performed by: EMERGENCY MEDICINE

## 2019-11-14 PROCEDURE — 80053 COMPREHEN METABOLIC PANEL: CPT | Performed by: EMERGENCY MEDICINE

## 2019-11-14 PROCEDURE — 87086 URINE CULTURE/COLONY COUNT: CPT | Performed by: INTERNAL MEDICINE

## 2019-11-14 PROCEDURE — 83036 HEMOGLOBIN GLYCOSYLATED A1C: CPT | Performed by: NURSE PRACTITIONER

## 2019-11-14 RX ORDER — INSULIN GLARGINE 100 [IU]/ML
14 INJECTION, SOLUTION SUBCUTANEOUS EVERY MORNING
Status: DISCONTINUED | OUTPATIENT
Start: 2019-11-14 | End: 2019-11-16 | Stop reason: HOSPADM

## 2019-11-14 RX ORDER — LEVOTHYROXINE SODIUM 0.05 MG/1
50 TABLET ORAL DAILY
COMMUNITY

## 2019-11-14 RX ORDER — FAMOTIDINE 20 MG/1
20 TABLET, FILM COATED ORAL DAILY
Status: DISCONTINUED | OUTPATIENT
Start: 2019-11-14 | End: 2019-11-16 | Stop reason: HOSPADM

## 2019-11-14 RX ORDER — VENLAFAXINE HYDROCHLORIDE 75 MG/1
300 CAPSULE, EXTENDED RELEASE ORAL DAILY
Status: DISCONTINUED | OUTPATIENT
Start: 2019-11-14 | End: 2019-11-16 | Stop reason: HOSPADM

## 2019-11-14 RX ORDER — NITROGLYCERIN 0.4 MG/1
0.4 TABLET SUBLINGUAL
COMMUNITY
End: 2020-07-16 | Stop reason: SDUPTHER

## 2019-11-14 RX ORDER — ALBUTEROL SULFATE 2.5 MG/3ML
2.5 SOLUTION RESPIRATORY (INHALATION) EVERY 4 HOURS PRN
COMMUNITY
End: 2022-02-10

## 2019-11-14 RX ORDER — BUPROPION HYDROCHLORIDE 150 MG/1
150 TABLET ORAL EVERY MORNING
Status: DISCONTINUED | OUTPATIENT
Start: 2019-11-14 | End: 2019-11-16 | Stop reason: HOSPADM

## 2019-11-14 RX ORDER — CETIRIZINE HYDROCHLORIDE 10 MG/1
10 TABLET ORAL DAILY
COMMUNITY
End: 2022-03-09 | Stop reason: ALTCHOICE

## 2019-11-14 RX ORDER — CIMETIDINE 400 MG/1
400 TABLET, FILM COATED ORAL 2 TIMES DAILY PRN
COMMUNITY
End: 2020-11-20

## 2019-11-14 RX ORDER — LEVOTHYROXINE SODIUM 0.05 MG/1
50 TABLET ORAL
Status: DISCONTINUED | OUTPATIENT
Start: 2019-11-14 | End: 2019-11-16 | Stop reason: HOSPADM

## 2019-11-14 RX ORDER — ONDANSETRON 2 MG/ML
4 INJECTION INTRAMUSCULAR; INTRAVENOUS ONCE
Status: COMPLETED | OUTPATIENT
Start: 2019-11-14 | End: 2019-11-14

## 2019-11-14 RX ORDER — PANTOPRAZOLE SODIUM 40 MG/1
40 TABLET, DELAYED RELEASE ORAL
Status: DISCONTINUED | OUTPATIENT
Start: 2019-11-14 | End: 2019-11-16 | Stop reason: HOSPADM

## 2019-11-14 RX ORDER — ATORVASTATIN CALCIUM 10 MG/1
10 TABLET, FILM COATED ORAL NIGHTLY
Status: DISCONTINUED | OUTPATIENT
Start: 2019-11-14 | End: 2019-11-16 | Stop reason: HOSPADM

## 2019-11-14 RX ORDER — LUBIPROSTONE 24 UG/1
24 CAPSULE ORAL
Status: DISCONTINUED | OUTPATIENT
Start: 2019-11-14 | End: 2019-11-16 | Stop reason: HOSPADM

## 2019-11-14 RX ORDER — ATORVASTATIN CALCIUM 10 MG/1
10 TABLET, FILM COATED ORAL DAILY
COMMUNITY
End: 2021-08-12 | Stop reason: SINTOL

## 2019-11-14 RX ORDER — VENLAFAXINE HYDROCHLORIDE 150 MG/1
300 CAPSULE, EXTENDED RELEASE ORAL DAILY
COMMUNITY
End: 2020-01-22 | Stop reason: SDUPTHER

## 2019-11-14 RX ORDER — MORPHINE SULFATE 4 MG/ML
2 INJECTION, SOLUTION INTRAMUSCULAR; INTRAVENOUS ONCE
Status: COMPLETED | OUTPATIENT
Start: 2019-11-14 | End: 2019-11-14

## 2019-11-14 RX ORDER — BUDESONIDE AND FORMOTEROL FUMARATE DIHYDRATE 160; 4.5 UG/1; UG/1
2 AEROSOL RESPIRATORY (INHALATION)
Status: DISCONTINUED | OUTPATIENT
Start: 2019-11-14 | End: 2019-11-16 | Stop reason: HOSPADM

## 2019-11-14 RX ORDER — SODIUM CHLORIDE 0.9 % (FLUSH) 0.9 %
10 SYRINGE (ML) INJECTION AS NEEDED
Status: DISCONTINUED | OUTPATIENT
Start: 2019-11-14 | End: 2019-11-16 | Stop reason: HOSPADM

## 2019-11-14 RX ORDER — TRAZODONE HYDROCHLORIDE 100 MG/1
200 TABLET ORAL NIGHTLY
Status: DISCONTINUED | OUTPATIENT
Start: 2019-11-14 | End: 2019-11-14

## 2019-11-14 RX ORDER — SODIUM CHLORIDE 0.9 % (FLUSH) 0.9 %
10 SYRINGE (ML) INJECTION EVERY 12 HOURS SCHEDULED
Status: DISCONTINUED | OUTPATIENT
Start: 2019-11-14 | End: 2019-11-16 | Stop reason: HOSPADM

## 2019-11-14 RX ORDER — FUROSEMIDE 40 MG/1
40 TABLET ORAL DAILY
COMMUNITY
End: 2020-08-06 | Stop reason: SDUPTHER

## 2019-11-14 RX ORDER — PREGABALIN 100 MG/1
100 CAPSULE ORAL 2 TIMES DAILY
Status: DISCONTINUED | OUTPATIENT
Start: 2019-11-14 | End: 2019-11-14

## 2019-11-14 RX ORDER — TERAZOSIN 1 MG/1
1 CAPSULE ORAL NIGHTLY
Status: DISCONTINUED | OUTPATIENT
Start: 2019-11-14 | End: 2019-11-16 | Stop reason: HOSPADM

## 2019-11-14 RX ORDER — ONDANSETRON 2 MG/ML
4 INJECTION INTRAMUSCULAR; INTRAVENOUS EVERY 6 HOURS PRN
Status: DISCONTINUED | OUTPATIENT
Start: 2019-11-14 | End: 2019-11-14

## 2019-11-14 RX ORDER — ALPRAZOLAM 0.5 MG/1
0.5 TABLET ORAL 2 TIMES DAILY PRN
Status: DISCONTINUED | OUTPATIENT
Start: 2019-11-14 | End: 2019-11-14

## 2019-11-14 RX ORDER — FUROSEMIDE 40 MG/1
40 TABLET ORAL DAILY
Status: DISCONTINUED | OUTPATIENT
Start: 2019-11-14 | End: 2019-11-16 | Stop reason: HOSPADM

## 2019-11-14 RX ORDER — CYCLOBENZAPRINE HCL 10 MG
10 TABLET ORAL 3 TIMES DAILY PRN
COMMUNITY
End: 2021-02-26 | Stop reason: SDUPTHER

## 2019-11-14 RX ORDER — CYCLOBENZAPRINE HCL 10 MG
10 TABLET ORAL 3 TIMES DAILY PRN
Status: DISCONTINUED | OUTPATIENT
Start: 2019-11-14 | End: 2019-11-14

## 2019-11-14 RX ORDER — ALBUTEROL SULFATE 2.5 MG/3ML
2.5 SOLUTION RESPIRATORY (INHALATION) EVERY 4 HOURS PRN
Status: DISCONTINUED | OUTPATIENT
Start: 2019-11-14 | End: 2019-11-16 | Stop reason: HOSPADM

## 2019-11-14 RX ORDER — DEXTROSE MONOHYDRATE 25 G/50ML
25 INJECTION, SOLUTION INTRAVENOUS
Status: DISCONTINUED | OUTPATIENT
Start: 2019-11-14 | End: 2019-11-16 | Stop reason: HOSPADM

## 2019-11-14 RX ORDER — LOPERAMIDE HYDROCHLORIDE 2 MG/1
2 CAPSULE ORAL 4 TIMES DAILY PRN
Status: DISCONTINUED | OUTPATIENT
Start: 2019-11-14 | End: 2019-11-15

## 2019-11-14 RX ORDER — ONDANSETRON 4 MG/1
4 TABLET, FILM COATED ORAL EVERY 6 HOURS PRN
Status: DISCONTINUED | OUTPATIENT
Start: 2019-11-14 | End: 2019-11-14

## 2019-11-14 RX ORDER — HYDROCODONE BITARTRATE AND ACETAMINOPHEN 10; 325 MG/1; MG/1
1 TABLET ORAL EVERY 6 HOURS PRN
Status: DISCONTINUED | OUTPATIENT
Start: 2019-11-14 | End: 2019-11-16 | Stop reason: HOSPADM

## 2019-11-14 RX ORDER — NICOTINE POLACRILEX 4 MG
15 LOZENGE BUCCAL
Status: DISCONTINUED | OUTPATIENT
Start: 2019-11-14 | End: 2019-11-16 | Stop reason: HOSPADM

## 2019-11-14 RX ORDER — PREGABALIN 50 MG/1
100 CAPSULE ORAL 2 TIMES DAILY
COMMUNITY
End: 2021-10-19

## 2019-11-14 RX ORDER — SODIUM CHLORIDE 9 MG/ML
100 INJECTION, SOLUTION INTRAVENOUS CONTINUOUS
Status: DISCONTINUED | OUTPATIENT
Start: 2019-11-14 | End: 2019-11-16 | Stop reason: HOSPADM

## 2019-11-14 RX ORDER — CETIRIZINE HYDROCHLORIDE 10 MG/1
10 TABLET ORAL DAILY
Status: DISCONTINUED | OUTPATIENT
Start: 2019-11-14 | End: 2019-11-14

## 2019-11-14 RX ADMIN — INSULIN LISPRO 5 UNITS: 100 INJECTION, SOLUTION INTRAVENOUS; SUBCUTANEOUS at 12:13

## 2019-11-14 RX ADMIN — BUPROPION HYDROCHLORIDE 150 MG: 150 TABLET, EXTENDED RELEASE ORAL at 06:14

## 2019-11-14 RX ADMIN — HYDROCODONE BITARTRATE AND ACETAMINOPHEN 1 TABLET: 10; 325 TABLET ORAL at 12:12

## 2019-11-14 RX ADMIN — FAMOTIDINE 20 MG: 20 TABLET ORAL at 10:00

## 2019-11-14 RX ADMIN — VENLAFAXINE HYDROCHLORIDE 300 MG: 75 CAPSULE, EXTENDED RELEASE ORAL at 10:02

## 2019-11-14 RX ADMIN — LEVOTHYROXINE SODIUM 50 MCG: 50 TABLET ORAL at 06:15

## 2019-11-14 RX ADMIN — HYDROCODONE BITARTRATE AND ACETAMINOPHEN 1 TABLET: 10; 325 TABLET ORAL at 18:26

## 2019-11-14 RX ADMIN — TERAZOSIN HYDROCHLORIDE 1 MG: 1 CAPSULE ORAL at 20:40

## 2019-11-14 RX ADMIN — SODIUM CHLORIDE 1000 ML: 900 INJECTION, SOLUTION INTRAVENOUS at 00:17

## 2019-11-14 RX ADMIN — FUROSEMIDE 40 MG: 40 TABLET ORAL at 09:36

## 2019-11-14 RX ADMIN — Medication 10 ML: at 20:40

## 2019-11-14 RX ADMIN — Medication 10 ML: at 09:35

## 2019-11-14 RX ADMIN — INSULIN LISPRO 5 UNITS: 100 INJECTION, SOLUTION INTRAVENOUS; SUBCUTANEOUS at 09:35

## 2019-11-14 RX ADMIN — CEFTRIAXONE SODIUM 1 G: 1 INJECTION, POWDER, FOR SOLUTION INTRAMUSCULAR; INTRAVENOUS at 02:10

## 2019-11-14 RX ADMIN — BUDESONIDE AND FORMOTEROL FUMARATE DIHYDRATE 2 PUFF: 160; 4.5 AEROSOL RESPIRATORY (INHALATION) at 07:43

## 2019-11-14 RX ADMIN — ONDANSETRON 4 MG: 2 INJECTION INTRAMUSCULAR; INTRAVENOUS at 02:35

## 2019-11-14 RX ADMIN — LUBIPROSTONE 24 MCG: 24 CAPSULE, GELATIN COATED ORAL at 10:01

## 2019-11-14 RX ADMIN — SODIUM CHLORIDE 100 ML/HR: 900 INJECTION, SOLUTION INTRAVENOUS at 10:26

## 2019-11-14 RX ADMIN — HYDROCODONE BITARTRATE AND ACETAMINOPHEN 1 TABLET: 10; 325 TABLET ORAL at 06:14

## 2019-11-14 RX ADMIN — ONDANSETRON 4 MG: 2 INJECTION INTRAMUSCULAR; INTRAVENOUS at 00:17

## 2019-11-14 RX ADMIN — CETIRIZINE HYDROCHLORIDE 10 MG: 10 TABLET, FILM COATED ORAL at 09:37

## 2019-11-14 RX ADMIN — SODIUM CHLORIDE 100 ML/HR: 900 INJECTION, SOLUTION INTRAVENOUS at 15:51

## 2019-11-14 RX ADMIN — PANTOPRAZOLE SODIUM 40 MG: 40 TABLET, DELAYED RELEASE ORAL at 09:36

## 2019-11-14 RX ADMIN — INSULIN LISPRO 5 UNITS: 100 INJECTION, SOLUTION INTRAVENOUS; SUBCUTANEOUS at 17:41

## 2019-11-14 RX ADMIN — MORPHINE SULFATE 2 MG: 4 INJECTION INTRAVENOUS at 02:07

## 2019-11-14 RX ADMIN — PREGABALIN 100 MG: 100 CAPSULE ORAL at 09:37

## 2019-11-14 RX ADMIN — INSULIN GLARGINE 14 UNITS: 100 INJECTION, SOLUTION SUBCUTANEOUS at 06:15

## 2019-11-14 RX ADMIN — BUDESONIDE AND FORMOTEROL FUMARATE DIHYDRATE 2 PUFF: 160; 4.5 AEROSOL RESPIRATORY (INHALATION) at 20:19

## 2019-11-14 NOTE — PLAN OF CARE
Problem: Patient Care Overview  Goal: Plan of Care Review  Outcome: Ongoing (interventions implemented as appropriate)   11/14/19 9775   Coping/Psychosocial   Plan of Care Reviewed With patient   OTHER   Outcome Summary patient c/o with low abdominal pain with diarrhea previously discharged earlier in the week     Goal: Individualization and Mutuality  Outcome: Ongoing (interventions implemented as appropriate)    Goal: Discharge Needs Assessment  Outcome: Ongoing (interventions implemented as appropriate)    Goal: Interprofessional Rounds/Family Conf  Outcome: Ongoing (interventions implemented as appropriate)      Problem: Fall Risk (Adult)  Goal: Identify Related Risk Factors and Signs and Symptoms  Outcome: Ongoing (interventions implemented as appropriate)    Goal: Absence of Fall  Outcome: Ongoing (interventions implemented as appropriate)      Problem: Pain, Acute (Adult)  Goal: Identify Related Risk Factors and Signs and Symptoms  Outcome: Ongoing (interventions implemented as appropriate)    Goal: Acceptable Pain Control/Comfort Level  Outcome: Ongoing (interventions implemented as appropriate)

## 2019-11-14 NOTE — CONSULTS
"Diabetes Education  Assessment/Teaching    Patient Name:  Marlena Avilez  YOB: 1955  MRN: [id number]  Admit Date:  11/13/2019      Assessment Date:  11/14/2019    Most Recent Value   General Information    Referral From:  A1c [A1c 11/14/2019 7.1%]   Height  162.6 cm (64\")   Height Method  Stated   Weight  86.5 kg (190 lb 9.6 oz)   Weight Method  Standing scale   Pregnancy Assessment   Diabetes History   What type of diabetes do you have?  Type 2   Current DM knowledge  good   Do you test your blood sugar at home?  yes   Frequency of checks  3 times/day   Meter type  talking bs meter but unsure of name of meter   Who performs the test?  self   Typical readings  <180   How would you rate your diabetes control?  good   Education Preferences   What areas of diabetes would you like to learn about?  avoiding high blood sugar, diabetes complications, medications for diabetes, testing my blood sugar at home   Nutrition Information   Assessment Topics   Taking Medication - Assessment  Needs education   Problem Solving - Assessment  Needs education   Reducing Risk - Assessment  Needs education   Monitoring - Assessment  Needs education   DM Goals   Taking Medication - Goal  Today   Problem Solving - Goal  Today   Reducing Risk - Goal  Today   Monitoring - Goal  Today            Most Recent Value   DM Education Needs   Meter  Has own   Frequency of Testing  3 times a day   Blood Glucose Target Range  Discussed with pt her A1c result of 7.1%. Reviewed how this relates to bs levels. Discussed healthy bs range and healthy A1c target.   Medication  Insulin, Pen [Pt taking Tresiba at hs and Novolog premeals. She states she is also taking Janument bid. Pt states takes her insulin as prescribed.]   Problem Solving  Hyperglycemia, Signs, Symptoms, Treatment   Reducing Risks  A1C testing   Motivation  Engaged   Teaching Method  Explanation, Discussion, Handouts   Patient Response  Verbalized understanding    "         Other Comments:  Discussed importance of notifying MD if bs begin running outside the healthy target. Pt states she is needing more test strips for bs monitoring soon but unsure of name of meter. Encouraged pt to contact her PCP after d/c when she has meter with her and call in to MD the request for rx for test strips. Pt states she will do this. She states her bs meter is working properly and she plans to continue checking 3 times/day. Gave A1c info sheet. Pt with no additional questions/concerns at this time.        Electronically signed by:  Darlene Prabhakar RN  11/14/19 6:13 PM

## 2019-11-14 NOTE — ED PROVIDER NOTES
Subjective   64-year-old female complains of lower abdominal pain, moderate, no aggravating alleviating factors, associated with watery diarrhea, occasionally dark for 1 week.  Patient was seen here Tuesday and had labs as well as a CT scan which demonstrated urinary retention.  Patient had in and out Marques catheter placed with 1800 cc of urine return.  Patient reports chronic urinary retention greater than 1 year.  Patient denies any lower back pain weakness or numbness to extremities or any paresthesias.  Patient does report normal urine output without dysuria since discharge.  Patient denies any exposure to C. difficile or recent antibiotic usage or travel or known sick contacts.            Review of Systems   Gastrointestinal: Positive for abdominal pain, diarrhea and nausea.   All other systems reviewed and are negative.      Past Medical History:   Diagnosis Date   • Anemia    • Anxiety disorder    • Arthritis    • Atrial fibrillation (CMS/HCC)     Proxysmal   • Chronic kidney disease    • COPD (chronic obstructive pulmonary disease) (CMS/HCC)    • Dark stools    • Depression    • Disease of thyroid gland    • GERD (gastroesophageal reflux disease)    • History of hernia repair    • Hyperlipidemia    • Hypertension    • IBS (irritable bowel syndrome)    • Liver disease    • Low back pain    • Obesity    • Seizure disorder (CMS/HCC)    • Type 2 diabetes mellitus (CMS/HCC)    • Weight gain        Allergies   Allergen Reactions   • Adhesive Tape Rash     Paper tape causes the rash   • Contrast Dye GI Intolerance   • Iodinated Diagnostic Agents Nausea Only   • Latex Rash   • Penicillins Hives   • Statins Rash     BAD LEG CRAMPS     • Sulfa Antibiotics Hives       Past Surgical History:   Procedure Laterality Date   • BACK SURGERY     • CARDIAC CATHETERIZATION      Abstraction from [facility]: [facility]? 1980's, 3-16   • CHOLECYSTECTOMY     • ENDOSCOPY  03/31/2015    Normal   • HERNIA REPAIR  01/19/2016    Dr Mota    • HYSTERECTOMY     • TONSILLECTOMY         Family History   Problem Relation Age of Onset   • Cancer Mother         Other Cancer   • Heart disease Mother    • Hypertension Mother    • Stroke Mother    • Hypertension Father    • Kidney disease Father    • Diabetes Paternal Uncle    • Cancer Paternal Uncle         Colon Cancer       Social History     Socioeconomic History   • Marital status: Single     Spouse name: Not on file   • Number of children: Not on file   • Years of education: Not on file   • Highest education level: Not on file   Tobacco Use   • Smoking status: Current Every Day Smoker     Packs/day: 1.00   • Smokeless tobacco: Never Used   • Tobacco comment: Passive Smoke: N   Substance and Sexual Activity   • Alcohol use: No     Frequency: Never   • Drug use: Defer   • Sexual activity: Defer           Objective   Physical Exam   Constitutional: She is oriented to person, place, and time. She appears well-developed and well-nourished.   HENT:   Head: Normocephalic and atraumatic.   Mouth/Throat: Oropharynx is clear and moist.   Eyes: Pupils are equal, round, and reactive to light.   Cardiovascular: Normal rate, regular rhythm, normal heart sounds and intact distal pulses.   Pulmonary/Chest: Effort normal.   Mild rhonchi bilaterally   Abdominal: Soft. Bowel sounds are normal.   Mild lower abdominal ttp, no rebound or guarding   Genitourinary:   Genitourinary Comments: Stool  Brown, heme negative   Musculoskeletal: Normal range of motion. She exhibits no edema or deformity.   Neurological: She is alert and oriented to person, place, and time.   Motor and sensation intact   Skin: Skin is warm and dry. Capillary refill takes less than 2 seconds.   Psychiatric: She has a normal mood and affect. Her behavior is normal.       Procedures           ED Course                  MDM  Number of Diagnoses or Management Options  Abdominal pain, unspecified abdominal location:   Acute UTI:   Diagnosis management  comments: Results for orders placed or performed during the hospital encounter of 11/13/19  -Comprehensive Metabolic Panel       Result                      Value             Ref Range           Glucose                     144 (H)           65 - 99 mg/dL       BUN                         24 (H)            8 - 23 mg/dL        Creatinine                  0.93              0.57 - 1.00 *       Sodium                      138               136 - 145 mm*       Potassium                   4.1               3.5 - 5.2 mm*       Chloride                    97 (L)            98 - 107 mmo*       CO2                         31.0 (H)          22.0 - 29.0 *       Calcium                     9.7               8.6 - 10.5 m*       Total Protein               7.2               6.0 - 8.5 g/*       Albumin                     3.90              3.50 - 5.20 *       ALT (SGPT)                  16                1 - 33 U/L          AST (SGOT)                  14                1 - 32 U/L          Alkaline Phosphatase        114               39 - 117 U/L        Total Bilirubin             <0.2 (L)          0.2 - 1.2 mg*       eGFR Non  Amer       61                >60 mL/min/1*       Globulin                    3.3               gm/dL               A/G Ratio                   1.2               g/dL                BUN/Creatinine Ratio        25.8 (H)          7.0 - 25.0          Anion Gap                   10.0              5.0 - 15.0 m*  -Protime-INR       Result                      Value             Ref Range           Protime                     9.8               9.6 - 11.7 S*       INR                         0.92              0.90 - 1.10    -aPTT       Result                      Value             Ref Range           PTT                         31.7 (H)          24.0 - 31.0 *  -Lipase       Result                      Value             Ref Range           Lipase                      38                13 - 60 U/L    -Urinalysis  With Microscopic If Indicated (No Culture) - Urine, Catheter       Result                      Value             Ref Range           Color, UA                   Yellow            Yellow, Straw       Appearance, UA              Clear             Clear               pH, UA                      5.5               5.0 - 8.0           Specific [place], UA        1.015             1.005 - 1.030       Glucose, UA                 Negative          Negative            Ketones, UA                 Negative          Negative            Bilirubin, UA               Negative          Negative            Blood, UA                   Negative          Negative            Protein, UA                 Negative          Negative            Leuk Esterase, UA                             Negative        Moderate (2+) (A)       Nitrite, UA                 Positive (A)      Negative            Urobilinogen, UA            0.2 E.U./dL       0.2 - 1.0 E.*  -CBC Auto Differential       Result                      Value             Ref Range           WBC                         10.10             3.40 - 10.80*       RBC                         4.46              3.77 - 5.28 *       Hemoglobin                  13.5              12.0 - 15.9 *       Hematocrit                  40.7              34.0 - 46.6 %       MCV                         91.3              79.0 - 97.0 *       MCH                         30.3              26.6 - 33.0 *       MCHC                        33.2              31.5 - 35.7 *       RDW                         13.3              12.3 - 15.4 %       RDW-SD                      42.9              37.0 - 54.0 *       MPV                         9.4               6.0 - 12.0 fL       Platelets                   221               140 - 450 10*       Neutrophil %                49.7              42.7 - 76.0 %       Lymphocyte %                37.3              19.6 - 45.3 %       Monocyte %                  8.6               5.0 - 12.0  %        Eosinophil %                3.3               0.3 - 6.2 %         Basophil %                  1.1               0.0 - 1.5 %         Neutrophils, Absolute       5.00              1.70 - 7.00 *       Lymphocytes, Absolute       3.80 (H)          0.70 - 3.10 *       Monocytes, Absolute         0.90              0.10 - 0.90 *       Eosinophils, Absolute       0.30              0.00 - 0.40 *       Basophils, Absolute         0.10              0.00 - 0.20 *       nRBC                        0.1               0.0 - 0.2 /1*  -Urinalysis, Microscopic Only - Urine, Catheter       Result                      Value             Ref Range           RBC, UA                     None Seen         None Seen /H*       WBC, UA                     31-50 (A)         None Seen /H*       Bacteria, UA                1+ (A)            None Seen /H*       Squamous Epithelial Ce*     None Seen         None Seen, 0*       Hyaline Casts, UA           3-6               None Seen /L*       Methodology                                                   Automated Microscopy    CT scan done on Tuesday did not demonstrate acute abnormality other than urinary retention.  Bladder scan demonstrated approximately 200 cc of urine.  Lower abdominal pain may be related to bowel spasms from diarrhea versus cystitis, patient states pain is moderate to severe, will admit.       Amount and/or Complexity of Data Reviewed  Clinical lab tests: reviewed  Decide to obtain previous medical records or to obtain history from someone other than the patient: yes        Final diagnoses:   Abdominal pain, unspecified abdominal location   Acute UTI              Reggie Braxton MD  11/14/19 0159

## 2019-11-14 NOTE — ED NOTES
Pt assisted to bedside commode. Pt unable to leave urine sample.      Sylvia Daigle RN  11/14/19 0031

## 2019-11-14 NOTE — CONSULTS
Urology Consult Note    Patient:Marlena Avilez :1955  Room:[facility]  Admit Date2019  Age:64 y.o.     SEX:female     DOS:2019     MR:[id number]     Visit:[id number]       Attending: Javid Bonner MD  Referring Provider: Dr Bonner  Reason for Consultation: Urinary retention    Patient Care Team:  Dottie Nunez APRN as PCP - General (Nurse Practitioner)  Dottie Nunez APRN as PCP - Claims Attributed    Chief complaint urinary retention    Subjective .     History of present illness: Patient is a 64-year-old white female with a long history of urinary retention and trouble emptying her bladder with straining to void.  She also feels like she has a urinary tract infection.  CT scan revealed normal kidneys    Review of Systems  12 point review of systems were reviewed and are negative except for what is in HPI.    History  Past Medical History:   Diagnosis Date   • Anemia    • Anxiety disorder    • Arthritis    • Atrial fibrillation (CMS/HCC)     Proxysmal   • Chronic kidney disease    • COPD (chronic obstructive pulmonary disease) (CMS/HCC)    • Dark stools    • Depression    • Disease of thyroid gland    • GERD (gastroesophageal reflux disease)    • History of hernia repair    • Hyperlipidemia    • Hypertension    • IBS (irritable bowel syndrome)    • Liver disease    • Low back pain    • Obesity    • Seizure disorder (CMS/HCC)    • Type 2 diabetes mellitus (CMS/HCC)    • Weight gain      Past Surgical History:   Procedure Laterality Date   • BACK SURGERY     • CARDIAC CATHETERIZATION      Abstraction from [facility]: [facility]? s, 3-16   • CHOLECYSTECTOMY     • ENDOSCOPY  2015    Normal   • HERNIA REPAIR  2016    Dr Mota   • HYSTERECTOMY     • TONSILLECTOMY       Social History     Socioeconomic History   • Marital status: Single     Spouse name: Not on file   • Number of children: Not on file   • Years of education: Not on file   • Highest education level: Not on file   Tobacco  Use   • Smoking status: Current Every Day Smoker     Packs/day: 1.00   • Smokeless tobacco: Never Used   • Tobacco comment: Passive Smoke: N   Substance and Sexual Activity   • Alcohol use: No     Frequency: Never   • Drug use: Defer   • Sexual activity: Defer     Family History   Problem Relation Age of Onset   • Cancer Mother         Other Cancer   • Heart disease Mother    • Hypertension Mother    • Stroke Mother    • Hypertension Father    • Kidney disease Father    • Diabetes Paternal Uncle    • Cancer Paternal Uncle         Colon Cancer     Allergies   Allergen Reactions   • Adhesive Tape Rash     Paper tape causes the rash   • Contrast Dye GI Intolerance   • Iodinated Diagnostic Agents Nausea Only   • Latex Rash   • Penicillins Hives   • Statins Rash     BAD LEG CRAMPS     • Sulfa Antibiotics Hives     Prior to Admission medications    Medication Sig Start Date End Date Taking? Authorizing Provider   albuterol (PROVENTIL) (2.5 MG/3ML) 0.083% nebulizer solution Take 2.5 mg by nebulization Every 4 (Four) Hours As Needed for Wheezing.   Yes Mikhail Rausch MD   atorvastatin (LIPITOR) 10 MG tablet Take 10 mg by mouth Daily.   Yes Mikhail Rausch MD   cetirizine (zyrTEC) 10 MG tablet Take 10 mg by mouth Daily.   Yes Mikhail Rausch MD   cimetidine (TAGAMET) 400 MG tablet Take 400 mg by mouth 2 (Two) Times a Day As Needed.   Yes Mikhail Rausch MD   cyclobenzaprine (FLEXERIL) 10 MG tablet Take 10 mg by mouth 3 (Three) Times a Day As Needed for Muscle Spasms.   Yes Mikhail Rausch MD   furosemide (LASIX) 40 MG tablet Take 40 mg by mouth Daily.   Yes Mikhail Rausch MD   insulin aspart (novoLOG FLEXPEN) 100 UNIT/ML solution pen-injector sc pen Inject 5 Units under the skin into the appropriate area as directed 3 (Three) Times a Day With Meals.   Yes Mikhail Rausch MD   insulin degludec (TRESIBA FLEXTOUCH) 100 UNIT/ML solution pen-injector injection Inject 14 Units under the  skin into the appropriate area as directed Daily.   Yes Mikhail Rausch MD   levothyroxine (SYNTHROID, LEVOTHROID) 50 MCG tablet Take 50 mcg by mouth Daily.   Yes Mikhail Rausch MD   metoprolol tartrate (LOPRESSOR) 25 MG tablet Take 25 mg by mouth 2 (Two) Times a Day.   Yes Mikhail Rausch MD   nitroglycerin (NITROSTAT) 0.4 MG SL tablet Place 0.4 mg under the tongue Every 5 (Five) Minutes As Needed for Chest Pain. Take no more than 3 doses in 15 minutes.   Yes Mikhail Rausch MD   pregabalin (LYRICA) 100 MG capsule Take 100 mg by mouth 2 (Two) Times a Day.   Yes Mikhail Rausch MD   SITagliptin-metFORMIN HCl ER (JANUMET XR)  MG tablet Take 2 tablets by mouth Daily.   Yes Mikhail Rausch MD   venlafaxine XR (EFFEXOR-XR) 150 MG 24 hr capsule Take 300 mg by mouth Daily.   Yes Mikhail Rausch MD   ALPRAZolam (XANAX) 0.5 MG tablet Take 1 tablet by mouth 2 (Two) Times a Day As Needed for Anxiety. 10/14/19 10/13/20  Erin Avilez PA-C   buPROPion XL (WELLBUTRIN XL) 150 MG 24 hr tablet Take 1 tablet by mouth Every Morning. 10/14/19 10/13/20  Erin Avilez PA-C   diclofenac (VOLTAREN) 75 MG EC tablet Take 75 mg by mouth 2 (Two) Times a Day. 6/24/19   Mikhail Rausch MD   fluticasone-salmeterol (ADVAIR) 250-50 MCG/DOSE DISKUS Inhale 1 puff 2 (Two) Times a Day. 8/5/16   Mikhail Rausch MD   HYDROcodone-acetaminophen (NORCO)  MG per tablet Take 1 tablet by mouth Every 6 (Six) Hours As Needed for Moderate Pain . 9/30/19   Donnell Calderon MD   icosapent ethyl (VASCEPA) 1 g capsule capsule Take 2 g by mouth Daily. 10/4/19   Mikhail Rausch MD   lubiprostone (AMITIZA) 24 MCG capsule Take 24 mcg by mouth Daily With Breakfast. 2/22/19   Mikhail Rausch MD   MULTIPLE VITAMINS ESSENTIAL PO Take 1 tablet by mouth Daily.    Mikhail Rausch MD   pantoprazole (PROTONIX) 40 MG EC tablet Take 40 mg by mouth Daily. 8/12/19   Provider, Mikhail,  MD   prazosin (MINIPRESS) 1 MG capsule Take one capsule at bedtime for nightmares. 10/29/19   Erin Avilez PA-C   traZODone (DESYREL) 100 MG tablet Take two tablets at bedtime for sleep 10/29/19   Erin Avilez PA-C   albuterol (PROVENTIL) (2.5 MG/3ML) 0.083% nebulizer solution USE 1 VIAL IN MINI-NEBULIZER EVERY FOUR HOURS AS NEEDED FOR WHEEZING 1/11/18 11/14/19  Mikhail Rausch MD   ammonium lactate (LAC-HYDRIN) 12 % lotion Apply  topically to the appropriate area as directed. 1/7/19 11/14/19  Mikhail Rausch MD   atorvastatin (LIPITOR) 10 MG tablet TAKE ONE TABLET BY MOUTH DAILY 10/8/19 11/14/19  Kiki De Los Santos MD   cephalexin (KEFLEX) 500 MG capsule TAKE 1 CAPSULE BY MOUTH 3 (THREE) TIMES DAILY FOR 10 DAYS. 7/31/19 11/14/19  Mikhali Rausch MD   cetirizine (ALL DAY ALLERGY) 10 MG tablet TAKE ONE TABLET BY MOUTH EVERY DAY AS NEEDED FOR ALLERGIES/RHINITIS 8/5/16 11/14/19  Mikhail Rausch MD   cimetidine (TAGAMET) 400 MG tablet TAKE ONE TABLET BY MOUTH TWICE DAILY AS NEEDED (NOT DISPILL) 7/3/19 11/14/19  Mikhail Rausch MD   cyclobenzaprine (FLEXERIL) 10 MG tablet TAKE ONE BY MOUTH THREE TIMES PER DAY AS NEEDED FOR MUSCLE SPASM 8/29/19 11/14/19  Mikhail Rausch MD   fluticasone (FLONASE) 50 MCG/ACT nasal spray 2 sprays by Each Nare route Daily. 5/7/19 11/14/19  Mikhail Rausch MD   furosemide (LASIX) 40 MG tablet TAKE ONE TABLET BY MOUTH EVERY DAY 8/5/16 11/14/19  Mikhail Rausch MD   furosemide (LASIX) 40 MG tablet TAKE ONE TABLET BY MOUTH EVERY DAY 9/9/19 11/14/19  Mikhail Rausch MD   LANCETS ULTRA THIN 30G [facility] LANCETS ULTRA THIN 30G 9/20/17 11/14/19  Mikhail Rausch MD   levothyroxine (SYNTHROID, LEVOTHROID) 50 MCG tablet TAKE ONE TABLET BY MOUTH EVERY DAY 6/7/18 11/14/19  Provider, MD Mikhail   metoprolol tartrate (LOPRESSOR) 25 MG tablet TAKE ONE TABLET BY MOUTH TWICE DAILY 8/5/16 11/14/19  Provider, Historical, MD   Multiple  Vitamins-Minerals (EQ COMPLETE MULTIVIT ADULT 50+) tablet EQ COMPLETE MULTIVIT ADULT 50+ TABS 18  Mikhail Rausch MD   neomycin-polymyxin-hydrocortisone (CORTISPORIN) 3.5-73916-3 otic solution Administer 3 drops into ear(s) as directed by provider 3 (Three) Times a Day. 19  Mikhail Rausch MD   nitroglycerin (NITROSTAT) 0.4 MG SL tablet DISSOLVE 1 TABLET UNDER TONGUE EVERY 5 MINUTES AS NEEDED FOR CHEST PAIN NO MORE THAN 3 TABLETS 19  Mikhail Rausch MD   NOVOLOG FLEXPEN 100 UNIT/ML solution pen-injector sc pen INJECT 5 UNITS INTO SKIN THREE TIMES A DAY BEFORE MEALS 19  Mikhail Rausch MD   nystatin (MYCOSTATIN) 686943 UNIT/GM cream Apply  topically to the appropriate area as directed. 19  Mikhail Rausch MD   pregabalin (LYRICA) 100 MG capsule TAKE 1 CAPSULE BY MOUTH TWICE DAILY 19  Mikahil Rausch MD   SITagliptin-metFORMIN HCl ER (JANUMET XR)  MG tablet TAKE 2 TABLETS BY MOUTH DAILY WITH DINNER *PT REQUEST AM/PM DUE TO SWALLOWING TROUBLE* 19  Mikhail Rausch MD   TRESIBA FLEXTOUCH 100 UNIT/ML solution pen-injector injection INJECT 14 UNITS SUB-Q DAILY (ACTUAL D/S 107) 19  Mikhail Rausch MD   UNIFINE PENTIPS 32G X 4 MM misc USE 3 TIMES DAILY WITH INSULIN INJECTIONS DX E11.65 10/30/19 11/14/19  Kiki De Los Santos MD   venlafaxine XR (EFFEXOR-XR) 150 MG 24 hr capsule Take two capsules daily  Patient taking differently: 300 mg. Take two capsules daily 10/14/19 11/14/19  Erin Avilez PA-C         Objective     tMax 24 hours:  Temp (24hrs), Av.9 °F (36.6 °C), Min:97.5 °F (36.4 °C), Max:98.4 °F (36.9 °C)    Vital Sign Ranges:  Temp:  [97.5 °F (36.4 °C)-98.4 °F (36.9 °C)] 98.4 °F (36.9 °C)  Heart Rate:  [84-96] 84  Resp:  [16-22] 16  BP: ()/(52-65) 98/65  Intake and Output Last 3 Shifts:  No intake/output data recorded.      Physical  Exam:     General Appearance:    Alert, cooperative, in no acute distress   Head:    Normocephalic, without obvious abnormality, atraumatic   Eyes:            Lids and lashes normal, conjunctivae and sclerae normal, no   icterus, no pallor, corneas clear, PERRLA   Ears:    Ears appear intact with no abnormalities noted   Throat:   No oral lesions, no thrush, oral mucosa moist   Neck:   No adenopathy, supple, trachea midline, no thyromegaly, no     carotid bruit, no JVD   Back:     No kyphosis present, no scoliosis present, no skin lesions,       erythema or scars, no tenderness to percussion or                   palpation,   range of motion normal   Lungs:     Clear to auscultation,respirations regular, even and                   unlabored    Heart:    Regular rhythm and normal rate, normal S1 and S2, no            murmur, no gallop, no rub, no click   Breast Exam:    Deferred   Abdomen:     Normal bowel sounds, no masses, no organomegaly, soft        non-tender, non-distended, no guarding, no rebound                 tenderness   Genitalia:    Deferred   Extremities:   Moves all extremities well, no edema, no cyanosis, no              redness   Pulses:   Pulses palpable and equal bilaterally   Skin:   No bleeding, bruising or rash   Lymph nodes:   No palpable adenopathy   Neurologic:   Cranial nerves 2 - 12 grossly intact, sensation intact, DTR        present and equal bilaterally       Results Review:     Lab Results (last 24 hours)     Procedure Component Value Units Date/Time    Urine Culture - Urine, Urine, Clean Catch [[id number]] Collected:  11/14/19 0126    Specimen:  Urine, Clean Catch Updated:  11/14/19 1219    POC Glucose Once [[id number]]  (Abnormal) Collected:  11/14/19 1157    Specimen:  Blood Updated:  11/14/19 1210     Glucose 117 mg/dL      Comment: Serial Number: [id number]:  [id number]       POC Glucose Once [[id number]]  (Abnormal) Collected:  11/14/19 0714    Specimen:  Blood Updated:  11/14/19  0717     Glucose 122 mg/dL      Comment: Serial Number: [id number]:  [id number]       Basic Metabolic Panel [[id number]]  (Abnormal) Collected:  11/14/19 0607    Specimen:  Blood Updated:  11/14/19 0717     Glucose 143 mg/dL      BUN 22 mg/dL      Creatinine 0.95 mg/dL      Sodium 143 mmol/L      Potassium 4.3 mmol/L      Chloride 102 mmol/L      CO2 30.0 mmol/L      Calcium 9.2 mg/dL      eGFR Non African Amer 60 mL/min/1.73      BUN/Creatinine Ratio 23.2     Anion Gap 11.0 mmol/L     Narrative:       GFR Normal >60  Chronic Kidney Disease <60  Kidney Failure <15    CBC Auto Differential [[id number]]  (Abnormal) Collected:  11/14/19 0607    Specimen:  Blood Updated:  11/14/19 0656     WBC 8.50 10*3/mm3      RBC 3.93 10*6/mm3      Hemoglobin 12.3 g/dL      Hematocrit 36.0 %      MCV 91.6 fL      MCH 31.3 pg      MCHC 34.2 g/dL      RDW 13.2 %      RDW-SD 42.9 fl      MPV 9.1 fL      Platelets 206 10*3/mm3      Neutrophil % 45.3 %      Lymphocyte % 40.8 %      Monocyte % 8.5 %      Eosinophil % 4.5 %      Basophil % 0.9 %      Neutrophils, Absolute 3.80 10*3/mm3      Lymphocytes, Absolute 3.40 10*3/mm3      Monocytes, Absolute 0.70 10*3/mm3      Eosinophils, Absolute 0.40 10*3/mm3      Basophils, Absolute 0.10 10*3/mm3      nRBC 0.1 /100 WBC     Hemoglobin A1c [[id number]] Collected:  11/14/19 0607    Specimen:  Blood Updated:  11/14/19 0653    POC Glucose Once [[id number]]  (Abnormal) Collected:  11/14/19 0450    Specimen:  Blood Updated:  11/14/19 0451     Glucose 152 mg/dL      Comment: Serial Number: [id number]:  [id number]       Urinalysis With Microscopic If Indicated (No Culture) - Urine, Catheter [[id number]]  (Abnormal) Collected:  11/14/19 0126    Specimen:  Urine, Catheter Updated:  11/14/19 0150     Color, UA Yellow     Appearance, UA Clear     pH, UA 5.5     Specific Gravity, UA 1.015     Glucose, UA Negative     Ketones, UA Negative     Bilirubin, UA Negative     Blood, UA Negative     Protein, UA  Negative     Leuk Esterase, UA Moderate (2+)     Nitrite, UA Positive     Urobilinogen, UA 0.2 E.U./dL    Urinalysis, Microscopic Only - Urine, Catheter [[id number]]  (Abnormal) Collected:  11/14/19 0126    Specimen:  Urine, Catheter Updated:  11/14/19 0150     RBC, UA None Seen /HPF      WBC, UA 31-50 /HPF      Bacteria, UA 1+ /HPF      Squamous Epithelial Cells, UA None Seen /HPF      Hyaline Casts, UA 3-6 /LPF      Methodology Automated Microscopy    Comprehensive Metabolic Panel [[id number]]  (Abnormal) Collected:  11/14/19 0019    Specimen:  Blood Updated:  11/14/19 0043     Glucose 144 mg/dL      BUN 24 mg/dL      Creatinine 0.93 mg/dL      Sodium 138 mmol/L      Potassium 4.1 mmol/L      Chloride 97 mmol/L      CO2 31.0 mmol/L      Calcium 9.7 mg/dL      Total Protein 7.2 g/dL      Albumin 3.90 g/dL      ALT (SGPT) 16 U/L      AST (SGOT) 14 U/L      Alkaline Phosphatase 114 U/L      Total Bilirubin <0.2 mg/dL      eGFR Non African Amer 61 mL/min/1.73      Globulin 3.3 gm/dL      A/G Ratio 1.2 g/dL      BUN/Creatinine Ratio 25.8     Anion Gap 10.0 mmol/L     Narrative:       GFR Normal >60  Chronic Kidney Disease <60  Kidney Failure <15    Lipase [[id number]]  (Normal) Collected:  11/14/19 0019    Specimen:  Blood Updated:  11/14/19 0043     Lipase 38 U/L     Protime-INR [[id number]]  (Normal) Collected:  11/14/19 0019    Specimen:  Blood Updated:  11/14/19 0037     Protime 9.8 Seconds      INR 0.92    aPTT [[id number]]  (Abnormal) Collected:  11/14/19 0019    Specimen:  Blood Updated:  11/14/19 0037     PTT 31.7 seconds     CBC & Differential [[id number]] Collected:  11/14/19 0019    Specimen:  Blood Updated:  11/14/19 0026    Narrative:       The following orders were created for panel order CBC & Differential.  Procedure                               Abnormality         Status                     ---------                               -----------         ------                     CBC Auto  Differential[[id number]]        Abnormal            Final result                 Please view results for these tests on the individual orders.    CBC Auto Differential [[id number]]  (Abnormal) Collected:  11/14/19 0019    Specimen:  Blood Updated:  11/14/19 0026     WBC 10.10 10*3/mm3      RBC 4.46 10*6/mm3      Hemoglobin 13.5 g/dL      Hematocrit 40.7 %      MCV 91.3 fL      MCH 30.3 pg      MCHC 33.2 g/dL      RDW 13.3 %      RDW-SD 42.9 fl      MPV 9.4 fL      Platelets 221 10*3/mm3      Neutrophil % 49.7 %      Lymphocyte % 37.3 %      Monocyte % 8.6 %      Eosinophil % 3.3 %      Basophil % 1.1 %      Neutrophils, Absolute 5.00 10*3/mm3      Lymphocytes, Absolute 3.80 10*3/mm3      Monocytes, Absolute 0.90 10*3/mm3      Eosinophils, Absolute 0.30 10*3/mm3      Basophils, Absolute 0.10 10*3/mm3      nRBC 0.1 /100 WBC          No results found for: URINECX     Imaging Results (Last 7 Days)     ** No results found for the last 168 hours. **          Inpatient Meds:   Scheduled Meds:  atorvastatin 10 mg Oral Nightly   budesonide-formoterol 2 puff Inhalation BID - RT   buPROPion  mg Oral QAM   [START ON 11/15/2019] cefTRIAXone 1 g Intravenous Q24H   famotidine 20 mg Oral Daily   furosemide 40 mg Oral Daily   insulin glargine 14 Units Subcutaneous QAM   insulin lispro 0-9 Units Subcutaneous 4x Daily With Meals & Nightly   insulin lispro 5 Units Subcutaneous TID With Meals   levothyroxine 50 mcg Oral Q AM   lubiprostone 24 mcg Oral Daily With Breakfast   metoprolol tartrate 25 mg Oral Q12H   pantoprazole 40 mg Oral Q AM   sodium chloride 10 mL Intravenous Q12H   terazosin 1 mg Oral Nightly   venlafaxine  mg Oral Daily      Continuous Infusions:  sodium chloride 100 mL/hr Last Rate: 100 mL/hr (11/14/19 1026)      PRN Meds:.•  albuterol  •  dextrose  •  dextrose  •  glucagon (human recombinant)  •  HYDROcodone-acetaminophen  •  insulin lispro **AND** insulin lispro  •  loperamide  •  sodium  chloride      Assessment/Plan     Urinary retention    Immobility syndrome    UTI with culture pending on antibiotics    Plan bedside cystoscopy.  Discussed with patient all risk benefits and options and she is willing to proceed    I discussed the patients findings and my recommendations with patient.    Simon Lopez MD  11/14/19  12:31 PM

## 2019-11-14 NOTE — H&P
[facility] Medicine Services        Patient Name:  Marlena Avilez  YOB: 1955  MRN:  [id number]  Admit Date:  11/13/2019    Patient Care Team:  Dottie Nunez APRN as PCP - General (Nurse Practitioner)  Dottie Nunez APRN as PCP - Claims Attributed        History Present Illness     Chief Complaint   Patient presents with   • Abdominal Pain            64 year old obese female presented with complaints of right lower abdominal pain, nausea, no vomiting and diarrhea past 5 days . She reports  No hematochezia but possibly dark stool. She denies fever, chills or diaphoresis . She denies any unusual ingestions. Review of records shows she was seen here in ED a day ago with same complaints CT abdomen showed urine retention and she was straight cathed with 1800 cc out. No acute abdomen and she was discharged with referral to urology.  In ED today UA shows elevated wbc, l and 1+ bacteria.. Serum wbc normal and she is afebrile . Stool studies ordered and pending. She denies recent antibiotic use. PMH of DM2 with serum glucose non fasting 144. PMH COPD, GERD, HTN, sleep disturbance , HLD, chronic pain ( sees pain management ) and anxiety. She will be admitted for IVF hydration , IV abx , further evlauation and treatment .      Review of Systems   Constitution: Negative.   HENT: Negative.    Eyes: Negative.    Cardiovascular: Negative.    Respiratory: Negative.    Endocrine: Negative.    Hematologic/Lymphatic: Negative.    Skin: Negative.    Musculoskeletal: Positive for arthritis, back pain and joint pain.   Gastrointestinal: Positive for abdominal pain, diarrhea and nausea.   Genitourinary: Negative.    Neurological: Negative.    Psychiatric/Behavioral: Positive for depression. The patient has insomnia.    Allergic/Immunologic: Negative.            Personal History     Past Medical History:   Diagnosis Date   • Anemia    • Anxiety disorder    • Arthritis    • Atrial fibrillation  (CMS/HCC)     Proxysmal   • Chronic kidney disease    • COPD (chronic obstructive pulmonary disease) (CMS/HCC)    • Dark stools    • Depression    • Disease of thyroid gland    • GERD (gastroesophageal reflux disease)    • History of hernia repair    • Hyperlipidemia    • Hypertension    • IBS (irritable bowel syndrome)    • Liver disease    • Low back pain    • Obesity    • Seizure disorder (CMS/HCC)    • Type 2 diabetes mellitus (CMS/HCC)    • Weight gain      Past Surgical History:   Procedure Laterality Date   • BACK SURGERY     • CARDIAC CATHETERIZATION      Abstraction from [facility]: [facility]? 1980's, 3-16   • CHOLECYSTECTOMY     • ENDOSCOPY  03/31/2015    Normal   • HERNIA REPAIR  01/19/2016    Dr Mota   • HYSTERECTOMY     • TONSILLECTOMY       Family History   Problem Relation Age of Onset   • Cancer Mother         Other Cancer   • Heart disease Mother    • Hypertension Mother    • Stroke Mother    • Hypertension Father    • Kidney disease Father    • Diabetes Paternal Uncle    • Cancer Paternal Uncle         Colon Cancer     Social History     Tobacco Use   • Smoking status: Current Every Day Smoker     Packs/day: 1.00   • Smokeless tobacco: Never Used   • Tobacco comment: Passive Smoke: N   Substance Use Topics   • Alcohol use: No     Frequency: Never   • Drug use: Defer     Prior to Admission medications    Medication Sig Start Date End Date Taking? Authorizing Provider   albuterol (PROVENTIL) (2.5 MG/3ML) 0.083% nebulizer solution USE 1 VIAL IN MINI-NEBULIZER EVERY FOUR HOURS AS NEEDED FOR WHEEZING 1/11/18   Mikhail Rausch MD   ALPRAZolam (XANAX) 0.5 MG tablet Take 1 tablet by mouth 2 (Two) Times a Day As Needed for Anxiety. 10/14/19 10/13/20  Erin Avilez PA-C   ammonium lactate (LAC-HYDRIN) 12 % lotion Apply  topically to the appropriate area as directed. 1/7/19   Mikhail Rausch MD   atorvastatin (LIPITOR) 10 MG tablet TAKE ONE TABLET BY MOUTH DAILY 10/8/19   Kiki De Los Santos MD    buPROPion XL (WELLBUTRIN XL) 150 MG 24 hr tablet Take 1 tablet by mouth Every Morning. 10/14/19 10/13/20  Erin Avilez PA-C   cephalexin (KEFLEX) 500 MG capsule TAKE 1 CAPSULE BY MOUTH 3 (THREE) TIMES DAILY FOR 10 DAYS. 7/31/19   Mikhail Rausch MD   cetirizine (ALL DAY ALLERGY) 10 MG tablet TAKE ONE TABLET BY MOUTH EVERY DAY AS NEEDED FOR ALLERGIES/RHINITIS 8/5/16   Mikhail Rausch MD   cimetidine (TAGAMET) 400 MG tablet TAKE ONE TABLET BY MOUTH TWICE DAILY AS NEEDED (NOT DISPILL) 7/3/19   Mikhail Rausch MD   cyclobenzaprine (FLEXERIL) 10 MG tablet TAKE ONE BY MOUTH THREE TIMES PER DAY AS NEEDED FOR MUSCLE SPASM 8/29/19   Mikhail Rausch MD   diclofenac (VOLTAREN) 75 MG EC tablet Take 75 mg by mouth 2 (Two) Times a Day. 6/24/19   Mikhail Rausch MD   fluticasone (FLONASE) 50 MCG/ACT nasal spray 2 sprays by Each Nare route Daily. 5/7/19   Mikhail Rausch MD   fluticasone-salmeterol (ADVAIR) 250-50 MCG/DOSE DISKUS Inhale 1 puff. 8/5/16   Mikhail Rausch MD   furosemide (LASIX) 40 MG tablet TAKE ONE TABLET BY MOUTH EVERY DAY 8/5/16   Mikhail Rausch MD   furosemide (LASIX) 40 MG tablet TAKE ONE TABLET BY MOUTH EVERY DAY 9/9/19   Mikhail Rausch MD   HYDROcodone-acetaminophen (NORCO)  MG per tablet Take 1 tablet by mouth Every 6 (Six) Hours As Needed for Moderate Pain . 9/30/19   Donnell Calderon MD   icosapent ethyl (VASCEPA) 1 g capsule capsule Take 2 g by mouth. 10/4/19   Mikhail Rausch MD   LANCETS ULTRA THIN 30G [facility] LANCETS ULTRA THIN 30G 9/20/17   Mikhail Rausch MD   levothyroxine (SYNTHROID, LEVOTHROID) 50 MCG tablet TAKE ONE TABLET BY MOUTH EVERY DAY 6/7/18   Mikhail Rausch MD   lubiprostone (AMITIZA) 24 MCG capsule Take 24 mcg by mouth. 2/22/19   Mikhail Rausch MD   metoprolol tartrate (LOPRESSOR) 25 MG tablet TAKE ONE TABLET BY MOUTH TWICE DAILY 8/5/16   Provider, MD Mikhail   MULTIPLE VITAMINS ESSENTIAL PO  Take 1 tablet by mouth Daily.    Mikhail Rausch MD   Multiple Vitamins-Minerals (EQ COMPLETE MULTIVIT ADULT 50+) tablet EQ COMPLETE MULTIVIT ADULT 50+ TABS 6/7/18   Mikhail Rausch MD   neomycin-polymyxin-hydrocortisone (CORTISPORIN) 3.5-02751-4 otic solution Administer 3 drops into ear(s) as directed by provider 3 (Three) Times a Day. 7/31/19   Mikhail Rausch MD   nitroglycerin (NITROSTAT) 0.4 MG SL tablet DISSOLVE 1 TABLET UNDER TONGUE EVERY 5 MINUTES AS NEEDED FOR CHEST PAIN NO MORE THAN 3 TABLETS 4/30/19   Mikhail Rausch MD   NOVOLOG FLEXPEN 100 UNIT/ML solution pen-injector sc pen INJECT 5 UNITS INTO SKIN THREE TIMES A DAY BEFORE MEALS 6/14/19   Mikhail Rausch MD   nystatin (MYCOSTATIN) 635995 UNIT/GM cream Apply  topically to the appropriate area as directed. 1/11/19 1/11/20  Mikhail Rausch MD   pantoprazole (PROTONIX) 40 MG EC tablet Take 40 mg by mouth Daily. 8/12/19   Mikhail Rausch MD   prazosin (MINIPRESS) 1 MG capsule Take one capsule at bedtime for nightmares. 10/29/19   Erin Avilez PA-C   pregabalin (LYRICA) 100 MG capsule TAKE 1 CAPSULE BY MOUTH TWICE DAILY 8/16/19   Mikhail Rausch MD   SITagliptin-metFORMIN HCl ER (JANUMET XR)  MG tablet TAKE 2 TABLETS BY MOUTH DAILY WITH DINNER *PT REQUEST AM/PM DUE TO SWALLOWING TROUBLE* 9/9/19   Mikhail Rausch MD   traZODone (DESYREL) 100 MG tablet Take two tablets at bedtime for sleep 10/29/19   Erin Avilez PA-C   TRESIBA FLEXTOUCH 100 UNIT/ML solution pen-injector injection INJECT 14 UNITS SUB-Q DAILY (ACTUAL D/S 107) 6/14/19   Mikhail Rausch MD   UNIFINROMULO PENTIPS 32G X 4 MM misc USE 3 TIMES DAILY WITH INSULIN INJECTIONS DX E11.65 10/30/19   Kiki De Los Santos MD   venlafaxine XR (EFFEXOR-XR) 150 MG 24 hr capsule Take two capsules daily 10/14/19   Erin Avilez PA-C     Allergies:    Allergies   Allergen Reactions   • Adhesive Tape Rash     Paper tape causes the rash   •  Contrast Dye GI Intolerance   • Iodinated Diagnostic Agents Nausea Only   • Latex Rash   • Penicillins Hives   • Statins Rash     BAD LEG CRAMPS     • Sulfa Antibiotics Hives         Objective     Vital Signs  Temp:  [97.5 °F (36.4 °C)-97.8 °F (36.6 °C)] 97.5 °F (36.4 °C)  Heart Rate:  [85-96] 85  Resp:  [16-22] 18  BP: (105-113)/(52-63) 109/57  SpO2:  [93 %-96 %] 95 %  on  Flow (L/min):  [2] 2;   Device (Oxygen Therapy): nasal cannula  Body mass index is 32.72 kg/m².    Physical Exam   Constitutional: She is oriented to person, place, and time. She appears well-nourished.   HENT:   Head: Normocephalic and atraumatic.   Eyes: EOM are normal.   Neck: Normal range of motion. Neck supple.   Cardiovascular: Normal rate, regular rhythm and normal heart sounds.   Pulmonary/Chest: Effort normal and breath sounds normal.   Abdominal: Soft. Bowel sounds are normal.   Musculoskeletal: Normal range of motion.   Neurological: She is alert and oriented to person, place, and time.   Skin: Skin is warm and dry.   Psychiatric: She has a normal mood and affect. Her behavior is normal. Judgment and thought content normal.   Flat affect        Results Review:  I reviewed the patient's new clinical results.  I reviewed the patient's new imaging results and agree with the interpretation.  I reviewed the patient's other test results and agree with the interpretation  I personally viewed and interpreted the patient's EKG/Telemetry data  Discussed with ED provider.    Results from last 7 days   Lab Units 11/14/19  0019 11/12/19  1726   WBC 10*3/mm3 10.10 10.70   HEMOGLOBIN g/dL 13.5 14.4   HEMATOCRIT % 40.7 41.4   PLATELETS 10*3/mm3 221 223     Results from last 7 days   Lab Units 11/14/19  0019 11/12/19  1726   SODIUM mmol/L 138 137   POTASSIUM mmol/L 4.1 4.1   CHLORIDE mmol/L 97* 96*   CO2 mmol/L 31.0* 27.0   BUN mg/dL 24* 23   CREATININE mg/dL 0.93 1.07*   GLUCOSE mg/dL 144* 111*   CALCIUM mg/dL 9.7 10.0     Results from last 7 days    Lab Units 11/14/19  0019 11/12/19  1726   SODIUM mmol/L 138 137   POTASSIUM mmol/L 4.1 4.1   CHLORIDE mmol/L 97* 96*   CO2 mmol/L 31.0* 27.0   BUN mg/dL 24* 23   CREATININE mg/dL 0.93 1.07*   CALCIUM mg/dL 9.7 10.0   BILIRUBIN mg/dL <0.2*  --    ALK PHOS U/L 114  --    ALT (SGPT) U/L 16  --    AST (SGOT) U/L 14  --    GLUCOSE mg/dL 144* 111*         Lab Results   Component Value Date    CALCIUM 9.7 11/14/2019     No results found for: HGBA1C  Lab Results   Component Value Date    CHOL 191 05/29/2019    CHLPL 216 (H) 10/03/2019    TRIG 401 (H) 10/03/2019    HDL 41 (L) 10/03/2019    LDL UNABLE TO CALCULATE 10/03/2019     Lab Results   Component Value Date    LIPASE 38 11/14/2019             Microbiology Results (last 10 days)     ** No results found for the last 240 hours. **          ECG/EMG Results (most recent)     None                    Ct Abdomen Pelvis Without Contrast    Result Date: 11/12/2019   1. Distention of the bladder which was present on previous remote study from October 2017 which may be chronic. Correlate with findings of bowel obstruction. A small amount of air is present within the bladder lumen which may related to recent catheterization. Recommend clinical correlation. Otherwise, no acute process. 2. Nonvisualization of the appendix, similar as compared to the previous study which may related to previous resection. 3. Ancillary findings as described above.    Electronically Signed By-Jodee Gregory On:11/12/2019 6:03 PM This report was finalized on [id number] by  Jodee Gregory, .        Estimated Creatinine Clearance: 66.7 mL/min (by C-G formula based on SCr of 0.93 mg/dL).      Assessment/Plan     Active Hospital Problems    Diagnosis POA   • Abdominal pain [R10.9] Yes     Priority: High   • Acute UTI (urinary tract infection) [N39.0] Unknown     Priority: High   • Current every day smoker [F17.200] Yes     Priority: Medium   • Hypertension [I10] Yes     Priority: Medium   • Generalized  anxiety disorder [F41.1] Yes     Priority: Medium   • Insomnia due to mental disorder [F51.05] Yes     Priority: Medium   • Hypothyroidism [E03.9] Yes     Priority: Medium   • Depression [F32.9] Yes     Priority: Medium   • Hyperlipidemia [E78.5] Yes     Priority: Medium   • Type 2 diabetes mellitus (CMS/HCC) [E11.9] Yes     Priority: Medium   • Chronic obstructive pulmonary disease (CMS/HCC) [J44.9] Yes     Priority: Medium       Abdominal pain , nausea, diarrhea likely secondary to viral enteritis or UTI ( see below) - CT done yesterday normal , afebrile wbc normal, stool studies pending, IVFs and Imodium prn , antiemetics     Acute UTI- continue 1 gm Rocephin with urine culture pending     COPD with tobacco use- cessation encouraged continue home inhalers stable     Anxiety / depression - on Effexor , Xanax, wellbutrin  ( INSPECT verified)     DM2 - continue home insulin  Hold Janumet for possible tests add SSI prn and accuchecks LCS diet     HLD- on statin     Chronic pain - on Flexeril Norco , Lyrica ( INSPECT verifed) , diclofenac - hold for now     HTN- controlled- on Lasix , BB    GERD- on PPI     Sleep disorder - on Trazodone     Hypothyroidism - on levothyroxine     · I discussed the patients findings and my recommendations with patient.  ·     VTE Prophylaxis - SCDs.      Code Status -      MONIK Foster  [facility] Hospitalist Team  11/14/19  3:38 AM       Agree with below mentioned except my evaluation, assessment, plan and treatment supercedes that of ARNP or PA.      64 year old obese female presented with complaints of right lower abdominal pain, nausea, no vomiting and diarrhea past 5 days . She reports  No hematochezia but possibly dark stool. She denies fever, chills or diaphoresis . She denies any unusual ingestions. Review of records shows she was seen here in ED a day ago with same complaints CT abdomen showed urine retention and she was straight cathed with 1800 cc out. No  acute abdomen and she was discharged with referral to urology.  In ED today UA shows elevated wbc, l and 1+ bacteria.. Serum wbc normal and she is afebrile . Stool studies ordered and pending. She denies recent antibiotic use. PMH of DM2 with serum glucose non fasting 144. PMH COPD, GERD, HTN, sleep disturbance , HLD, chronic pain ( sees pain management ) and anxiety. She will be admitted for IVF hydration , IV abx , further evlauation and treatment .       Review of Systems   Constitution: Negative.   HENT: Negative.    Eyes: Negative.    Cardiovascular: Negative.    Respiratory: Negative.    Endocrine: Negative.    Hematologic/Lymphatic: Negative.    Skin: Negative.    Musculoskeletal: Positive for arthritis, back pain and joint pain.   Gastrointestinal: Positive for abdominal pain, diarrhea and nausea.   Genitourinary: Negative.    Neurological: Negative.    Psychiatric/Behavioral: Positive for depression. The patient has insomnia.    Allergic/Immunologic: Negative.        Physical Exam   Constitutional: Patient appears well-developed and well-nourished and in no acute distress.  Patient very drowsy.    HEENT:   Head: Normocephalic and atraumatic.   Eyes:  Pupils are equal, round, and reactive to light. EOM are intact. Sclera are anicteric and non-injected.  Mouth and Throat: Patient has moist mucous membranes. Oropharynx is clear of any erythema or exudate.       Neck: Neck supple.  No thyromegaly present. No lymphadenopathy present. No  masses.     Cardiovascular: Inspection: No JVD present. Palpation: No parasternal heave. Pedal pulses +1 bilaterally. No leg edema. Auscultation: Regular rate, regular rhythm, S1 normal and S2 normal. reveals no gallop and no friction rub. No Carotid bruit bilaterally.    Pulmonary/Chest: Inspection: No distress, no use of accessory muscles. Lungs are clear to auscultation bilaterally. No respiratory distress. No wheezes. No rhonchi. No rales.     Abdomen /Gastrointestinal:  Inspection: no distension. Palpation: no masses, no organomegaly. Soft.  suprapubic tenderness. Bowel sounds are normal.     Musculoskeletal: Normal Muscle tone. Age appropriate, no deformities.    Neurological: Patient is alert and oriented to person, place, and time. Cranial nerves II-XII are grossly intact with no focal deficits. Sensori-motor exam is normal. No cerebellar signs.    Skin: Skin is warm. No rash noted. Nails show no clubbing.  No cyanosis or erythema. No bruising.    Emotional Behavior:   Able to assess   Debilities:  Secondary to polypharmacy      Active Hospital Problems    Diagnosis  POA   • **Acute UTI (urinary tract infection) [N39.0]  Yes     Priority: High   • Somnolence [R40.0]  Yes     Priority: Medium   • Polypharmacy [Z79.899]  Not Applicable     Priority: Medium   • Acute urinary retention [R33.8]  Yes     Priority: Medium   • Current every day smoker [F17.200]  Yes   • Hypertension [I10]  Yes   • Generalized anxiety disorder [F41.1]  Yes   • Insomnia due to mental disorder [F51.05]  Yes   • Hypothyroidism [E03.9]  Yes   • Hyperlipidemia [E78.5]  Yes   • Type 2 diabetes mellitus (CMS/HCC) [E11.9]  Yes   • Chronic obstructive pulmonary disease (CMS/HCC) [J44.9]  Yes      Resolved Hospital Problems   No resolved problems to display.       Plan  Discontinue sedating and anticholinergic medications  More than likely the urinary retention is due to polypharmacy and anticholinergics  Consult urology  Care coordination with nursing for above-mentioned

## 2019-11-14 NOTE — OP NOTE
OP Note  Preop Dx: Urinary retention   uti  Postop Dx: Dx same  Procedure:  Cysto  Surgeon: Simon Lopez  Anesthesia:  Local  Complications: None  Findings: Normal bladder except it is very full and very trabeculated.  We will leave a catheter in place until I see her in the office next week.  Please treat her UTI with culture specific antibiotics     Description of procedure:  Patient was at her bedside and her groin areas prepped review sterile fashion.  Flexible cystoscopy was carried out finding a normal urethra normal bladder with no stones or cancer but she does have severe trabeculation in her bladder is very distended.  Plan is as above

## 2019-11-14 NOTE — PLAN OF CARE
Problem: Patient Care Overview  Goal: Individualization and Mutuality  Outcome: Ongoing (interventions implemented as appropriate)    Goal: Discharge Needs Assessment  Outcome: Ongoing (interventions implemented as appropriate)    Goal: Interprofessional Rounds/Family Conf  Outcome: Ongoing (interventions implemented as appropriate)      Problem: Fall Risk (Adult)  Goal: Identify Related Risk Factors and Signs and Symptoms  Outcome: Ongoing (interventions implemented as appropriate)    Goal: Absence of Fall  Outcome: Ongoing (interventions implemented as appropriate)      Problem: Pain, Acute (Adult)  Goal: Identify Related Risk Factors and Signs and Symptoms  Outcome: Ongoing (interventions implemented as appropriate)    Goal: Acceptable Pain Control/Comfort Level  Outcome: Ongoing (interventions implemented as appropriate)

## 2019-11-14 NOTE — PROGRESS NOTES
Discharge Planning Assessment  [facility]     Patient Name: Marlena Avilez  MRN: [id number]  Today's Date: 11/14/2019    Admit Date: 11/13/2019    Discharge Needs Assessment     Row Name 11/14/19 7773       Resource/Environmental Concerns    Transportation Concerns  other (see comments) May need Medicaid Transport services at ID.       Discharge Needs Assessment    Readmission Within the Last 30 Days  no previous admission in last 30 days    Equipment Currently Used at Home  walker, rolling;oxygen;nebulizer [employer] O2 2L pnc.    Patient's Choice of Community Agency(s)  Current with [employer] for Medicaid Waiver services 3 days per week    Discharge Coordination/Progress  From home with [employer] Waiver Services 3 days per wk. PT/OT pending.    Row Name 11/14/19 7503       Living Environment    Lives With  alone    Unique Family Situation  States friends who live around her check on her. States no family visit.    Current Living Arrangements  home/apartment/condo    Duration at Residence  apt.    Primary Care Provided by  self    Provides Primary Care For  no one    Caregiving Concerns  Pt has Medicaid HHA services 3 days per week.    Family Caregiver if Needed  none    Able to Return to Prior Arrangements  yes       Resource/Environmental Concerns    Resource/Environmental Concerns  none       Transition Planning    Patient/Family Anticipates Transition to  home    Patient/Family Anticipated Services at Transition          Discharge Plan     Row Name 11/14/19 1608       Plan    Plan  From home with [employer] Waiver Services 3 days per wk. PT/OT pending.    Plan Comments  Pt has home O2 and nebulizer.                  Demographic Summary     Row Name 11/14/19 2206       General Information    Admission Type  observation    Arrived From  emergency department    Required Notices Provided  Observation Status Notice    Referral Source  admission list    Reason for Consult  discharge planning    Preferred Language   English                   Misti Bridges, RN

## 2019-11-15 PROBLEM — K59.04 CHRONIC IDIOPATHIC CONSTIPATION: Chronic | Status: ACTIVE | Noted: 2019-11-15

## 2019-11-15 PROBLEM — R10.33 PERIUMBILICAL ABDOMINAL PAIN: Status: ACTIVE | Noted: 2019-11-15

## 2019-11-15 LAB
GLUCOSE BLDC GLUCOMTR-MCNC: 136 MG/DL (ref 70–105)
GLUCOSE BLDC GLUCOMTR-MCNC: 140 MG/DL (ref 70–105)
GLUCOSE BLDC GLUCOMTR-MCNC: 144 MG/DL (ref 70–105)
GLUCOSE BLDC GLUCOMTR-MCNC: 155 MG/DL (ref 70–105)

## 2019-11-15 PROCEDURE — 96361 HYDRATE IV INFUSION ADD-ON: CPT

## 2019-11-15 PROCEDURE — 82962 GLUCOSE BLOOD TEST: CPT

## 2019-11-15 PROCEDURE — 25010000002 CEFTRIAXONE PER 250 MG: Performed by: NURSE PRACTITIONER

## 2019-11-15 PROCEDURE — 97163 PT EVAL HIGH COMPLEX 45 MIN: CPT

## 2019-11-15 PROCEDURE — 63710000001 INSULIN LISPRO (HUMAN) PER 5 UNITS: Performed by: NURSE PRACTITIONER

## 2019-11-15 PROCEDURE — 99226 PR SBSQ OBSERVATION CARE/DAY 35 MINUTES: CPT | Performed by: INTERNAL MEDICINE

## 2019-11-15 PROCEDURE — 97535 SELF CARE MNGMENT TRAINING: CPT

## 2019-11-15 PROCEDURE — G0378 HOSPITAL OBSERVATION PER HR: HCPCS

## 2019-11-15 PROCEDURE — 97166 OT EVAL MOD COMPLEX 45 MIN: CPT

## 2019-11-15 PROCEDURE — 94799 UNLISTED PULMONARY SVC/PX: CPT

## 2019-11-15 PROCEDURE — 63710000001 INSULIN GLARGINE PER 5 UNITS: Performed by: NURSE PRACTITIONER

## 2019-11-15 RX ADMIN — POLYETHYLENE GLYCOL 3350 17 G: 17 POWDER, FOR SOLUTION ORAL at 21:36

## 2019-11-15 RX ADMIN — METOPROLOL TARTRATE 25 MG: 25 TABLET ORAL at 21:24

## 2019-11-15 RX ADMIN — BUDESONIDE AND FORMOTEROL FUMARATE DIHYDRATE 2 PUFF: 160; 4.5 AEROSOL RESPIRATORY (INHALATION) at 20:31

## 2019-11-15 RX ADMIN — LUBIPROSTONE 24 MCG: 24 CAPSULE, GELATIN COATED ORAL at 09:45

## 2019-11-15 RX ADMIN — HYDROCODONE BITARTRATE AND ACETAMINOPHEN 1 TABLET: 10; 325 TABLET ORAL at 21:39

## 2019-11-15 RX ADMIN — FAMOTIDINE 20 MG: 20 TABLET ORAL at 09:44

## 2019-11-15 RX ADMIN — BUPROPION HYDROCHLORIDE 150 MG: 150 TABLET, EXTENDED RELEASE ORAL at 06:17

## 2019-11-15 RX ADMIN — SORBITOL SOLUTION (BULK) 400 ML: 70 SOLUTION at 14:34

## 2019-11-15 RX ADMIN — POLYETHYLENE GLYCOL 3350 17 G: 17 POWDER, FOR SOLUTION ORAL at 15:19

## 2019-11-15 RX ADMIN — INSULIN LISPRO 5 UNITS: 100 INJECTION, SOLUTION INTRAVENOUS; SUBCUTANEOUS at 13:05

## 2019-11-15 RX ADMIN — INSULIN LISPRO 5 UNITS: 100 INJECTION, SOLUTION INTRAVENOUS; SUBCUTANEOUS at 09:44

## 2019-11-15 RX ADMIN — CEFTRIAXONE SODIUM 1 G: 1 INJECTION, POWDER, FOR SOLUTION INTRAMUSCULAR; INTRAVENOUS at 01:38

## 2019-11-15 RX ADMIN — INSULIN GLARGINE 14 UNITS: 100 INJECTION, SOLUTION SUBCUTANEOUS at 13:06

## 2019-11-15 RX ADMIN — Medication 10 ML: at 09:47

## 2019-11-15 RX ADMIN — BUDESONIDE AND FORMOTEROL FUMARATE DIHYDRATE 2 PUFF: 160; 4.5 AEROSOL RESPIRATORY (INHALATION) at 07:35

## 2019-11-15 RX ADMIN — ATORVASTATIN CALCIUM 10 MG: 10 TABLET, FILM COATED ORAL at 21:24

## 2019-11-15 RX ADMIN — SODIUM CHLORIDE 100 ML/HR: 900 INJECTION, SOLUTION INTRAVENOUS at 01:40

## 2019-11-15 RX ADMIN — LEVOTHYROXINE SODIUM 50 MCG: 50 TABLET ORAL at 05:59

## 2019-11-15 RX ADMIN — INSULIN LISPRO 2 UNITS: 100 INJECTION, SOLUTION INTRAVENOUS; SUBCUTANEOUS at 21:34

## 2019-11-15 RX ADMIN — SODIUM CHLORIDE 100 ML/HR: 900 INJECTION, SOLUTION INTRAVENOUS at 13:08

## 2019-11-15 RX ADMIN — PANTOPRAZOLE SODIUM 40 MG: 40 TABLET, DELAYED RELEASE ORAL at 05:59

## 2019-11-15 RX ADMIN — VENLAFAXINE HYDROCHLORIDE 300 MG: 75 CAPSULE, EXTENDED RELEASE ORAL at 09:45

## 2019-11-15 RX ADMIN — HYDROCODONE BITARTRATE AND ACETAMINOPHEN 1 TABLET: 10; 325 TABLET ORAL at 11:07

## 2019-11-15 RX ADMIN — INSULIN LISPRO 5 UNITS: 100 INJECTION, SOLUTION INTRAVENOUS; SUBCUTANEOUS at 18:40

## 2019-11-15 RX ADMIN — FUROSEMIDE 40 MG: 40 TABLET ORAL at 09:44

## 2019-11-15 RX ADMIN — TERAZOSIN HYDROCHLORIDE 1 MG: 1 CAPSULE ORAL at 21:24

## 2019-11-15 RX ADMIN — HYDROCODONE BITARTRATE AND ACETAMINOPHEN 1 TABLET: 10; 325 TABLET ORAL at 04:43

## 2019-11-15 RX ADMIN — Medication 10 ML: at 21:36

## 2019-11-15 NOTE — THERAPY EVALUATION
Acute Care - Occupational Therapy Initial Evaluation   [facility]     Patient Name: Marlena Avilez  : 1955  MRN: [id number]  Today's Date: 11/15/2019             Admit Date: 2019       ICD-10-CM ICD-9-CM   1. Abdominal pain, unspecified abdominal location R10.9 789.00   2. Acute UTI N39.0 599.0     Patient Active Problem List   Diagnosis   • Dysthymia   • Generalized anxiety disorder   • Arthralgia of left knee   • Lumbar spondylitis (CMS/HCC)   • Constipation due to pain medication   • Diabetic neuropathy (CMS/HCC)   • Leg pain, bilateral   • Cervical radiculopathy   • Low back pain   • Lumbago of lumbar region with sciatica   • Lumbar radiculopathy   • Spinal stenosis of lumbar region   • Lumbar spondylosis   • Neck pain   • Other long term (current) drug therapy   • Abnormal cardiovascular stress test   • Atrial fibrillation (CMS/HCC)   • Chronic obstructive pulmonary disease (CMS/HCC)   • Congenital coronary artery fistula   • Congestive heart failure (CMS/HCC)   • Current every day smoker   • Fibromyositis   • Esophageal stricture   • Generalized abdominal pain   • History of tracheostomy   • Hyperlipidemia   • Hypertension   • Hypothyroidism   • Insomnia due to mental disorder   • Iron deficiency anemia   • Myofascial muscle pain   • Nausea   • Morbid obesity (CMS/HCC)   • Palpitations   • Renal insufficiency   • S/P lumbar fusion   • Seasonal allergies   • Symptom referrable to nervous system   • Tobacco abuse counseling   • Tobacco dependence syndrome   • Type 2 diabetes mellitus (CMS/HCC)   • Vitamin D deficiency   • Chronic back pain   • Depression   • Bronchitis   • Acute UTI (urinary tract infection)   • Somnolence   • Polypharmacy   • Acute urinary retention     Past Medical History:   Diagnosis Date   • Anemia    • Anxiety disorder    • Arthritis    • Atrial fibrillation (CMS/HCC)     Proxysmal   • Chronic kidney disease    • COPD (chronic obstructive pulmonary disease) (CMS/HCC)    • Dark  stools    • Depression    • Disease of thyroid gland    • GERD (gastroesophageal reflux disease)    • History of hernia repair    • Hyperlipidemia    • Hypertension    • IBS (irritable bowel syndrome)    • Liver disease    • Low back pain    • Obesity    • Seizure disorder (CMS/HCC)    • Type 2 diabetes mellitus (CMS/HCC)    • Weight gain      Past Surgical History:   Procedure Laterality Date   • BACK SURGERY     • CARDIAC CATHETERIZATION      Abstraction from [facility]: [facility]? [facility]'s, 3-16   • CHOLECYSTECTOMY     • ENDOSCOPY  03/31/2015    Normal   • HERNIA REPAIR  01/19/2016    Dr Mota   • HYSTERECTOMY     • TONSILLECTOMY            OT ASSESSMENT FLOWSHEET (last 12 hours)      Occupational Therapy Evaluation     Row Name 11/15/19 0830                   OT Evaluation Time/Intention    Subjective Information  no complaints  -        Document Type  evaluation  -        Mode of Treatment  occupational therapy  -        Patient Effort  excellent  -        Symptoms Noted During/After Treatment  increased pain after being up at the sink Pt noted to hold ABD in pain.  -        Comment  Pt finishing her breakfast & agreeable to enter the bathroom & complete ADL. She needed mod cues for task organization, which may be normal for her in an unfamiliar environment. Pt is noted to have some sort of developmental or aquired cognitive disability. She has home caregiver 4-5 days a week through a medicaid waiver program and with this help has been able to maintain her own apartment residence. She pays her own bills & does her own ADL but CG drives her & helps her with house work and sometimes cooking. Pt functions on a basic level. She is bradykinetic with low trunk tone & forward neck flexion. She is very pleasant w/ low gravelly voice.  -           General Information    Pertinent History of Current Functional Problem  She has chronic urinary retention & is found to have traebeculated (spelling) bladder tissues  which are contributing to her retention & UTI. She has a pacheco catheter now & will have. She attends well to the catheter & reports it is not her first time.Pt has some diarrhea & a stool sample is ordered but no BM yet. She has no family relationships. Pt uses RW and O2 at night.  -        Equipment Ordered for Patient  -- needs 3-in-1 BSC for the bathtub & raised toilet seat insert  -           Relationship/Environment    Lives With  alone  -           Cognitive Assessment/Intervention- PT/OT    Orientation Status (Cognition)  oriented x 3  -        Follows Commands (Cognition)  WNL;follows one step commands  -           Safety Issues, Functional Mobility    Safety Issues Affecting Function (Mobility)  judgment;problem solving;insight into deficits/self awareness;sequencing abilities  -        Impairments Affecting Function (Mobility)  cognition;endurance/activity tolerance;grasp;muscle tone abnormal;postural/trunk control;strength  -           Functional Mobility    Functional Mobility- Ind. Level  contact guard assist  -        Functional Mobility- Device  rolling walker  -           Sit-Stand Transfer    Sit-Stand [place] (Transfers)  supervision  -        Assistive Device (Sit-Stand Transfers)  walker, front-wheeled  -           ADL Assessment/Intervention    BADL Assessment/Intervention  grooming;feeding;lower body dressing  -           Lower Body Dressing Assessment/Training    Lower Body Dressing [place] Level  socks pull up socks EOB w/ conditional (I).  -           Grooming Assessment/Training    [place] Level (Grooming)  hair care, combing/brushing;oral care regimen;wash face, hands;set up;supervision;verbal cues;minimum assist (75% patient effort)  -        Comment (Grooming)  min (A) to comb the back of her hair per her request. Her CG at times helps with this. Pt took out, styled, & replaced her barettes without assistance or cues.  -           Self-Feeding  Assessment/Training    [place] Level (Feeding)  prepare tray/open items;liquids to mouth;scoop food and bring to mouth;knife use;conditional independence takes time due to weak , limitd. coord'ntn, & bradykines  -           General ROM    GENERAL ROM COMMENTS  BUE shld flex 75%  -           MMT (Manual Muscle Testing)    General MMT Comments   is 3+/5; shld flex is 3+/5; knee ext is 5/5, hip flex 4-/5  -           Positioning and Restraints    Pre-Treatment Position  in bed  -        Post Treatment Position  chair  -        In Chair  notified nsg;call light within reach;exit alarm on;encouraged to call for assist;sitting  -           Pain Assessment    Additional Documentation  Pain Scale: FACES Pre/Post-Treatment (Group)  -           Pain Scale: FACES Pre/Post-Treatment    Pain: FACES Scale, Pretreatment  0-->no hurt  -        Pain: FACES Scale, Post-Treatment  2-->hurts little bit  -           Coping    Observed Emotional State  accepting;calm;cooperative  -        Verbalized Emotional State  acceptance  -           Plan of Care Review    Plan of Care Reviewed With  patient  -           Clinical Impression (OT)    Criteria for Skilled Therapeutic Interventions Met (OT Eval)  treatment indicated  -        Rehab Potential (OT Eval)  good, to achieve stated therapy goals  -        Therapy Frequency (OT Eval)  5 times/wk  -        Predicted Duration of Therapy Intervention (Therapy Eval)  until D/C  -        Anticipated Equipment Needs at Discharge (OT)  bedside commode;raised toilet seat  -        Anticipated Discharge Disposition (OT)  home with home health;home with assist  -           Planned OT Interventions    Planned Therapy Interventions (OT Eval)  activity tolerance training;BADL retraining;adaptive equipment training;functional balance retraining;neuromuscular control/coordination retraining;strengthening exercise;occupation/activity based  interventions;patient/caregiver education/training  -           OT Goals    Bathing Goal Selection (OT)  bathing, OT goal 1  -        Dressing Goal Selection (OT)  dressing, OT goal 1  -        Activity Tolerance Goal Selection (OT)  activity tolerance, OT goal 1  -        Additional Documentation  Activity Tolerance Goal Selection (OT) (Row)  -           Bathing Goal 1 (OT)    Activity/Assistive Device (Bathing Goal 1, OT)  bathing skills, all;shower chair;grab bar/tub rail  -        [place] Level/Cues Needed (Bathing Goal 1, OT)  supervision required;verbal cues required  -        Time Frame (Bathing Goal 1, OT)  2 weeks  -           Dressing Goal 1 (OT)    Activity/Assistive Device (Dressing Goal 1, OT)  dressing skills, all  -        [place]/Cues Needed (Dressing Goal 1, OT)  set-up required;verbal cues required  -        Time Frame (Dressing Goal 1, OT)  by discharge  -            Activity Tolerance Goal 1 (OT)    Activity Tolerance Goal 1 (OT)  upright I/ADL tolerance  -        Activity Level (Endurance Goal 1, OT)  15 min activity  -        Time Frame (Activity Tolerance Goal 1, OT)  2 weeks  -           Living Environment    Home Accessibility  wheelchair accessible ground level  -          User Key  (r) = Recorded By, (t) = Taken By, (c) = Cosigned By    Initials Name Effective Dates    Codie Gonzalez OT 03/01/19 -          Occupational Therapy Education     Title: PT OT SLP Therapies (In Progress)     Topic: Occupational Therapy (In Progress)     Point: ADL training (Done)     Description: Instruct learner(s) on proper safety adaptation and remediation techniques during self care or transfers.   Instruct in proper use of assistive devices.    Learning Progress Summary           Patient Acceptance, E, VU,NR by  at 11/15/2019 10:28 AM                   Point: Precautions (Done)     Description: Instruct learner(s) on prescribed precautions during self-care and  functional transfers.    Learning Progress Summary           Patient Acceptance, E, VU,NR by  at 11/15/2019 10:28 AM                               User Key     Initials Effective Dates Name Provider Type Discipline     03/01/19 -  Codie Moore OT Occupational Therapist OT                  OT Recommendation and Plan  Outcome Summary/Treatment Plan (OT)  Anticipated Equipment Needs at Discharge (OT): bedside commode, raised toilet seat  Anticipated Discharge Disposition (OT): home with home health, home with assist  Planned Therapy Interventions (OT Eval): activity tolerance training, BADL retraining, adaptive equipment training, functional balance retraining, neuromuscular control/coordination retraining, strengthening exercise, occupation/activity based interventions, patient/caregiver education/training  Therapy Frequency (OT Eval): 5 times/wk  Plan of Care Review  Plan of Care Reviewed With: patient  Plan of Care Reviewed With: patient  Outcome Summary: Pt demonstrates premorbid bradykinesia & IADL deficit but has medicaid HH aide to assist & Pt has had pacheco cath before & attends to it well during functional activities. She does need step-by-step cues in unfamiliar environment & is weak & painful in her ABD with bladder pathology related to chronic urinary retention. [facility] OT/PT & RN will benefit her return home to help her adapt & strengthen in her own environment which has better follow through with a cognitivly impaired population. OT will follow as often as possible to reinforce new training for toileting & to increase her activity tolerance & adaptive ADL skill. Requesting a raised height toilet seat and a 3-in-1 BSC to place in her tub/shower for safety & due to her weakness.    Outcome Measures     Row Name 11/15/19 1000             How much help from another person do you currently need...    Turning from your back to your side while in flat bed without using bedrails?  4  -      Moving from lying  on back to sitting on the side of a flat bed without bedrails?  4  -MH      Moving to and from a bed to a chair (including a wheelchair)?  3  -MH      Standing up from a chair using your arms (e.g., wheelchair, bedside chair)?  3  -MH      Climbing 3-5 steps with a railing?  3  -MH      To walk in hospital room?  3  -MH      AM-PAC 6 Clicks Score (PT)  20  -         Functional Assessment    Outcome Measure Options  AM-PAC 6 Clicks Basic Mobility (PT);Modified Max  -        User Key  (r) = Recorded By, (t) = Taken By, (c) = Cosigned By    Initials Name Provider Type     Codie Moore OT Occupational Therapist          Time Calculation:   Time Calculation- OT     Row Name 11/15/19 1033             Time Calculation- OT    OT Start Time  0830  -      OT Stop Time  0905  -      OT Time Calculation (min)  35 min  -      Total Timed Code Minutes- OT  15 minute(s)  -      OT Received On  11/15/19  -      OT - Next Appointment  11/18/19  -      OT Goal Re-Cert Due Date  11/29/19  -        User Key  (r) = Recorded By, (t) = Taken By, (c) = Cosigned By    Initials Name Provider Type     Codie Moore OT Occupational Therapist        Therapy Charges for Today     Code Description Service Date Service Provider Modifiers Qty    [id number]  OT EVAL MOD COMPLEXITY 4 11/15/2019 Codie Moore OT GO 1    [id number]  OT SELF CARE/MGMT/TRAIN EA 15 MIN 11/15/2019 Codie Moore OT GO 1               Codie Moore OT  11/15/2019

## 2019-11-15 NOTE — PLAN OF CARE
Problem: Patient Care Overview  Goal: Plan of Care Review  Outcome: Ongoing (interventions implemented as appropriate)   11/15/19 1029   Coping/Psychosocial   Plan of Care Reviewed With patient   OTHER   Outcome Summary Pt demonstrates premorbid bradykinesia & IADL deficit but has medicaid HH aide to assist & Pt has had pacheco cath before & attends to it well during functional activities. She does need step-by-step cues in unfamiliar environment & is weak & painful in her ABD with bladder pathology related to chronic urinary retention. [facility] OT/PT & RN will benefit her return home to help her adapt & strengthen in her own environment which has better follow through with a cognitivly impaired population. OT will follow as often as possible to reinforce new training for toileting & to increase her activity tolerance & adaptive ADL skill. Requesting a raised height toilet seat and a 3-in-1 BSC to place in her tub/shower for safety & due to her weakness.   Plan of Care Review   Progress no change

## 2019-11-15 NOTE — PROGRESS NOTES
[facility] Medicine Services  INPATIENT PROGRESS NOTE    Hospitalist Team  LOS 0 days    Patient Care Team:  Dottie Nunez APRN as PCP - General (Nurse Practitioner)  Dottie Nunez APRN as PCP - Claims Attributed      Chief Complaint / Subjective    Chief Complaint   Patient presents with   • Abdominal Pain       64 year old obese female presented with complaints of right lower abdominal pain, nausea, no vomiting and diarrhea past 5 days . She reports  No hematochezia but possibly dark stool. She denies fever, chills or diaphoresis . She denies any unusual ingestions. Review of records shows she was seen here in ED a day ago with same complaints CT abdomen showed urine retention and she was straight cathed with 1800 cc out. No acute abdomen and she was discharged with referral to urology.  In ED today UA shows elevated wbc, l and 1+ bacteria.. Serum wbc normal and she is afebrile . Stool studies ordered and pending. She denies recent antibiotic use. PMH of DM2 with serum glucose non fasting 144. PMH COPD, GERD, HTN, sleep disturbance , HLD, chronic pain ( sees pain management ) and anxiety. She will be admitted for IVF hydration , IV abx , further evlauation and treatment .      Interval History and ROS:     History taken from: patient    Review of Systems   Constitution: Negative.   HENT: Negative.    Eyes: Negative.    Cardiovascular: Negative.    Respiratory: Negative.    Endocrine: Negative.    Hematologic/Lymphatic: Negative.    Skin: Negative.    Musculoskeletal: Positive for arthritis, back pain and joint pain.   Gastrointestinal: Positive for abdominal pain, diarrhea and nausea.   Genitourinary: Negative.    Neurological: Negative.    Psychiatric/Behavioral: Positive for depression. The patient has insomnia.    Allergic/Immunologic: Negative.        Family History   Problem Relation Age of Onset   • Cancer Mother         Other Cancer   • Heart disease Mother    • Hypertension  "Mother    • Stroke Mother    • Hypertension Father    • Kidney disease Father    • Diabetes Paternal Uncle    • Cancer Paternal Uncle         Colon Cancer       Past Medical History:   Diagnosis Date   • Anemia    • Anxiety disorder    • Arthritis    • Atrial fibrillation (CMS/HCC)     Proxysmal   • Chronic kidney disease    • COPD (chronic obstructive pulmonary disease) (CMS/HCC)    • Dark stools    • Depression    • Disease of thyroid gland    • GERD (gastroesophageal reflux disease)    • History of hernia repair    • Hyperlipidemia    • Hypertension    • IBS (irritable bowel syndrome)    • Liver disease    • Low back pain    • Obesity    • Seizure disorder (CMS/HCC)    • Type 2 diabetes mellitus (CMS/HCC)    • Weight gain        Social History     Socioeconomic History   • Marital status: Single     Spouse name: Not on file   • Number of children: Not on file   • Years of education: Not on file   • Highest education level: Not on file   Tobacco Use   • Smoking status: Current Every Day Smoker     Packs/day: 1.00   • Smokeless tobacco: Never Used   • Tobacco comment: Passive Smoke: N   Substance and Sexual Activity   • Alcohol use: No     Frequency: Never   • Drug use: Defer   • Sexual activity: Defer         Objective    Physical Exam     Vital Signs  Temp:  [97.4 °F (36.3 °C)-98.2 °F (36.8 °C)] 97.5 °F (36.4 °C)  Heart Rate:  [] 80  Resp:  [16-18] 16  BP: ()/(44-65) 94/65    Oxygen Therapy  SpO2: 97 %  Pulse Oximetry Type: Intermittent  Device (Oxygen Therapy): nasal cannula  Flow (L/min): 2  Oxygen Concentration (%): 28  Flowsheet Rows      First Filed Value   Admission Height  162.6 cm (64\") Documented at 11/13/2019 2341   Admission Weight  86.7 kg (191 lb 2.2 oz) Documented at 11/13/2019 2341        Intake & Output (last 3 days)       11/12 0701 - 11/13 0700 11/13 0701 - 11/14 0700 11/14 0701 - 11/15 0700 11/15 0701 - 11/16 0700    I.V. (mL/kg)   950 (11)     IV Piggyback   100     Total " Intake(mL/kg)   1050 (12.1)     Urine (mL/kg/hr)   3165 (1.5) 300 (0.5)    Total Output   3165 300    Net   -2115 -300                Lines, Drains & Airways    Active LDAs     Name:   Placement date:   Placement time:   Site:   Days:    Peripheral IV 11/14/19 0015 Right Forearm   11/14/19    0015    Forearm   1    Urethral Catheter Silicone   11/14/19    1228     1                Physical Exam:    Physical Exam   Constitutional: Patient appears well-developed and well-nourished and in no acute distress.  Patient very drowsy.    HEENT:   Head: Normocephalic and atraumatic.   Eyes:  Pupils are equal, round, and reactive to light. EOM are intact. Sclera are anicteric and non-injected.  Mouth and Throat: Patient has moist mucous membranes. Oropharynx is clear of any erythema or exudate.       Neck: Neck supple.  No thyromegaly present. No lymphadenopathy present. No  masses.     Cardiovascular: Inspection: No JVD present. Palpation: No parasternal heave. Pedal pulses +1 bilaterally. No leg edema. Auscultation: Regular rate, regular rhythm, S1 normal and S2 normal. reveals no gallop and no friction rub. No Carotid bruit bilaterally.    Pulmonary/Chest: Inspection: No distress, no use of accessory muscles. Lungs are clear to auscultation bilaterally. No respiratory distress. No wheezes. No rhonchi. No rales.     Abdomen /Gastrointestinal: Inspection: no distension.  Large midline and at least 2 transverse scars from prior exploratory laparotomy per the patient and cholecystectomy.  Palpation: no masses, no organomegaly. Soft.  suprapubic tenderness. Bowel sounds are normal.     Musculoskeletal: Normal Muscle tone. Age appropriate, no deformities.    Neurological: Patient is alert and oriented to person, place, and time. Cranial nerves II-XII are grossly intact with no focal deficits. Sensori-motor exam is normal. No cerebellar signs.    Skin: Skin is warm. No rash noted. Nails show no clubbing.  No cyanosis or erythema.  No bruising.    Emotional Behavior:   Able to assess   Debilities:  Secondary to polypharmacy          Procedures:        Assessment/Plan with Problem wise       Active Hospital Problems    Diagnosis  POA   • **Acute UTI (urinary tract infection) [N39.0]  Yes     Priority: High   • Somnolence [R40.0]  Yes     Priority: Medium   • Polypharmacy [Z79.899]  Not Applicable     Priority: Medium   • Acute urinary retention [R33.8]  Yes     Priority: Medium   • Chronic idiopathic constipation [K59.04]  Yes   • Periumbilical abdominal pain [R10.33]  Yes   • Current every day smoker [F17.200]  Yes   • Hypertension [I10]  Yes   • Generalized anxiety disorder [F41.1]  Yes   • Insomnia due to mental disorder [F51.05]  Yes   • Hypothyroidism [E03.9]  Yes   • Hyperlipidemia [E78.5]  Yes   • Type 2 diabetes mellitus (CMS/HCC) [E11.9]  Yes   • Chronic obstructive pulmonary disease (CMS/[facility]) [J44.9]  Yes      Resolved Hospital Problems   No resolved problems to display.       Estimated Creatinine Clearance: 65.3 mL/min (by C-G formula based on SCr of 0.95 mg/dL).    Plan    Patient has not had a bowel movement for more than a week.  That would easily explain her abdomen pain.  I would not prescribe narcotics.  For this problem.  Care coordination with nursing regarding current issues and constipation.  11/15/19 1:26 PM.    Plan for disposition:        SLP OP Goals     Row Name 11/15/19 0926          Time Calculation    PT Goal Re-Cert Due Date  11/29/19  -       User Key  (r) = Recorded By, (t) = Taken By, (c) = Cosigned By    Initials Name Provider Type    Joanie Cao, PT Physical Therapist          Destination      No service coordination in this encounter.      Durable Medical Equipment      No service coordination in this encounter.      Dialysis/Infusion      No service coordination in this encounter.      Home Medical Care      No service coordination in this encounter.      Therapy      No service coordination in  this encounter.      [facility] Resources      No service coordination in this encounter.        Historical & Objective Data     Results Review:    I reviewed the patient's new clinical results.    Results from last 7 days   Lab Units 11/14/19  0607 11/14/19 0019 11/12/19  1726   WBC 10*3/mm3 8.50 10.10 10.70   HEMOGLOBIN g/dL 12.3 13.5 14.4   HEMATOCRIT % 36.0 40.7 41.4   PLATELETS 10*3/mm3 206 221 223     Results from last 7 days   Lab Units 11/14/19  0607 11/14/19 0019 11/12/19  1726   SODIUM mmol/L 143 138 137   POTASSIUM mmol/L 4.3 4.1 4.1   CHLORIDE mmol/L 102 97* 96*   CO2 mmol/L 30.0* 31.0* 27.0   BUN mg/dL 22 24* 23   CREATININE mg/dL 0.95 0.93 1.07*   CALCIUM mg/dL 9.2 9.7 10.0   BILIRUBIN mg/dL  --  <0.2*  --    ALK PHOS U/L  --  114  --    ALT (SGPT) U/L  --  16  --    AST (SGOT) U/L  --  14  --    GLUCOSE mg/dL 143* 144* 111*         Lab Results   Component Value Date    CALCIUM 9.2 11/14/2019     Hemoglobin A1C   Date Value Ref Range Status   11/14/2019 7.1 (H) 3.5 - 5.6 % Final     Lab Results   Component Value Date    CHOL 191 05/29/2019    CHLPL 216 (H) 10/03/2019    TRIG 401 (H) 10/03/2019    HDL 41 (L) 10/03/2019    LDL UNABLE TO CALCULATE 10/03/2019     Lab Results   Component Value Date    LIPASE 38 11/14/2019     Results from last 7 days   Lab Units 11/14/19  0019   INR  0.92             No results found for: INTRAOP, PREDX, FINALDX, COMDX    Microbiology Results (last 10 days)     Procedure Component Value - Date/Time    Urine Culture - Urine, Urine, Clean Catch [[id number]]  (Abnormal) Collected:  11/14/19 0126    Lab Status:  Preliminary result Specimen:  Urine, Clean Catch Updated:  11/15/19 0957     Urine Culture >100,000 CFU/mL Escherichia coli          ECG/EMG Results (most recent)     None                    Cardiolite (Tc-99m Sestamibi) stress test    Ct Abdomen Pelvis Without Contrast    Result Date: 11/12/2019   1. Distention of the bladder which was present on previous remote study  from October 2017 which may be chronic. Correlate with findings of bowel obstruction. A small amount of air is present within the bladder lumen which may related to recent catheterization. Recommend clinical correlation. Otherwise, no acute process. 2. Nonvisualization of the appendix, similar as compared to the previous study which may related to previous resection. 3. Ancillary findings as described above.    Electronically Signed By-Jodee Gregory On:11/12/2019 6:03 PM This report was finalized on [id number] by  Jodee Gregory, .      I personally reviewed patient's x-ray films and my findings are: 0    I personally reviewed patient's EKG strip and my findings are: 0    Medication Review:   I have reviewed the patient's current medication list    Scheduled Meds    atorvastatin 10 mg Oral Nightly   budesonide-formoterol 2 puff Inhalation BID - RT   buPROPion  mg Oral QAM   cefTRIAXone 1 g Intravenous Q24H   famotidine 20 mg Oral Daily   furosemide 40 mg Oral Daily   insulin glargine 14 Units Subcutaneous QAM   insulin lispro 0-9 Units Subcutaneous 4x Daily With Meals & Nightly   insulin lispro 5 Units Subcutaneous TID With Meals   levothyroxine 50 mcg Oral Q AM   lubiprostone 24 mcg Oral Daily With Breakfast   metoprolol tartrate 25 mg Oral Q12H   pantoprazole 40 mg Oral Q AM   smog enema 400 mL Rectal Once   sodium chloride 10 mL Intravenous Q12H   terazosin 1 mg Oral Nightly   venlafaxine  mg Oral Daily       Meds Infusions    sodium chloride 100 mL/hr Last Rate: 100 mL/hr (11/15/19 1308)       Meds PRN  •  albuterol  •  dextrose  •  dextrose  •  glucagon (human recombinant)  •  HYDROcodone-acetaminophen  •  insulin lispro **AND** insulin lispro  •  sodium chloride      Javid Bonner MD  11/15/19  1:33 PM

## 2019-11-15 NOTE — PROGRESS NOTES
Continued Stay Note  [facility]     Patient Name: Marlena Avilez  MRN: [id number]  Today's Date: 11/15/2019    Admit Date: 11/13/2019    Discharge Plan     Row Name 11/15/19 1544       Plan    Plan  Home with [facility] and Medicaid Waiver Services    Plan Comments  PT recommended home health. Pt has home O2 and nebulizer.                     Misti Bridges RN

## 2019-11-15 NOTE — THERAPY EVALUATION
Acute Care - Physical Therapy Initial Evaluation  [facility]     Patient Name: Marlena Avilez  : 1955  MRN: [id number]  Today's Date: 11/15/2019      Date of Referral to PT: 11/15/19         Admit Date: 2019    Visit Dx:     ICD-10-CM ICD-9-CM   1. Abdominal pain, unspecified abdominal location R10.9 789.00   2. Acute UTI N39.0 599.0     Patient Active Problem List   Diagnosis   • Dysthymia   • Generalized anxiety disorder   • Arthralgia of left knee   • Lumbar spondylitis (CMS/HCC)   • Constipation due to pain medication   • Diabetic neuropathy (CMS/HCC)   • Leg pain, bilateral   • Cervical radiculopathy   • Low back pain   • Lumbago of lumbar region with sciatica   • Lumbar radiculopathy   • Spinal stenosis of lumbar region   • Lumbar spondylosis   • Neck pain   • Other long term (current) drug therapy   • Abnormal cardiovascular stress test   • Atrial fibrillation (CMS/HCC)   • Chronic obstructive pulmonary disease (CMS/HCC)   • Congenital coronary artery fistula   • Congestive heart failure (CMS/HCC)   • Current every day smoker   • Fibromyositis   • Esophageal stricture   • Generalized abdominal pain   • History of tracheostomy   • Hyperlipidemia   • Hypertension   • Hypothyroidism   • Insomnia due to mental disorder   • Iron deficiency anemia   • Myofascial muscle pain   • Nausea   • Morbid obesity (CMS/HCC)   • Palpitations   • Renal insufficiency   • S/P lumbar fusion   • Seasonal allergies   • Symptom referrable to nervous system   • Tobacco abuse counseling   • Tobacco dependence syndrome   • Type 2 diabetes mellitus (CMS/HCC)   • Vitamin D deficiency   • Chronic back pain   • Depression   • Bronchitis   • Acute UTI (urinary tract infection)   • Somnolence   • Polypharmacy   • Acute urinary retention     Past Medical History:   Diagnosis Date   • Anemia    • Anxiety disorder    • Arthritis    • Atrial fibrillation (CMS/HCC)     Proxysmal   • Chronic kidney disease    • COPD (chronic obstructive  pulmonary disease) (CMS/HCC)    • Dark stools    • Depression    • Disease of thyroid gland    • GERD (gastroesophageal reflux disease)    • History of hernia repair    • Hyperlipidemia    • Hypertension    • IBS (irritable bowel syndrome)    • Liver disease    • Low back pain    • Obesity    • Seizure disorder (CMS/HCC)    • Type 2 diabetes mellitus (CMS/HCC)    • Weight gain      Past Surgical History:   Procedure Laterality Date   • BACK SURGERY     • C[facility]IAC [facility]IZ[facility]      [facility], 3-16   • CHOLECYSTECTOMY     • ENDOSCOPY  03/31/2015    Normal   • HERNIA REPAIR  01/19/2016    Dr Mota   • HYSTERECTOMY     • TONSILLECTOMY          PT ASSESSMENT (last 12 hours)      Physical Therapy Evaluation     Row Name 11/15/19 0900          PT Evaluation Time/Intention    Subjective Information  complains of;pain  -JR     Document Type  evaluation  -JR     Mode of Treatment  physical therapy  -JR     Patient Effort  good  -JR     Symptoms Noted During/After Treatment  increased pain  -JR     Row Name 11/15/19 0900          General Information    Patient Profile Reviewed?  yes  -JR     General Observations of Patient  -- patient is slow in movements but is good at basic care  -JR     Equipment Currently Used at Home  rollator  -JR     Pertinent History of Current Functional Problem  -- hospitalized with chronic urinary output issues  -JR     Row Name 11/15/19 0900          Cognitive Assessment/Intervention- PT/OT    Orientation Status (Cognition)  oriented x 3  -JR     Follows Commands (Cognition)  WNL;follows one step commands  -JR     Row Name 11/15/19 0900          Bed Mobility Assessment/Treatment    Bed Mobility Assessment/Treatment  bed mobility (all) activities  -JR     [place] Level (Bed Mobility)  minimum assist (75% patient effort)  -JR     Assistive Device (Bed Mobility)  bed rails  -JR     Row Name 11/15/19 0900          Transfer Assessment/Treatment    Transfer  Assessment/Treatment  sit-stand transfer;bed-chair transfer  -     Bed-Chair [place] (Transfers)  contact guard  -     Assistive Device (Bed-Chair Transfers)  walker, front-wheeled  -     Sit-Stand [place] (Transfers)  supervision  -     Row Name 11/15/19 0900          Sit-Stand Transfer    Assistive Device (Sit-Stand Transfers)  walker, front-wheeled  -JR     Row Name 11/15/19 0900          Gait/Stairs Assessment/Training    [place] Level (Gait)  contact guard;1 person to manage equipment  -     Assistive Device (Gait)  walker, front-wheeled  -     Distance in Feet (Gait)  250'  -     Row Name 11/15/19 0900          General ROM    GENERAL ROM COMMENTS  LE WNL  -     Row Name 11/15/19 0900          MMT (Manual Muscle Testing)    General MMT Comments  LE 4/5  -     Row Name 11/15/19 0900          Physical Therapy Clinical Impression    Date of Referral to PT  11/15/19  -     Prognosis (PT Clinical Impression)  -- good  -JR     Functional Level at Time of Evaluation (PT Clinical Impression)  -- pt is slow to perform activities but can do all with min/sb  -     Criteria for Skilled Interventions Met (PT Clinical Impression)  yes  -JR     Impairments Found (describe specific impairments)  gait, locomotion, and balance  -     Rehab Potential (PT Clinical Summary)  good, to achieve stated therapy goals  -     Care Plan Review (PT)  evaluation/treatment results reviewed  -     Row Name 11/15/19 0900          Physical Therapy Goals    Transfer Goal Selection (PT)  transfer, PT goal 1  -     Gait Training Goal Selection (PT)  gait training, PT goal 1  -     Row Name 11/15/19 0900          Transfer Goal 1 (PT)    Activity/Assistive Device (Transfer Goal 1, PT)  transfers, all  -     [place] Level/Cues Needed (Transfer Goal 1, PT)  independent;conditional independence  -     Time Frame (Transfer Goal 1, PT)  2 weeks  -     Row Name 11/15/19 0900          Gait Training  Goal 1 (PT)    Activity/Assistive Device (Gait Training Goal 1, PT)  gait (walking locomotion);assistive device use  -JR     [place] Level (Gait Training Goal 1, PT)  conditional independence  -JR     Distance (Gait Goal 1, PT)  1200  -JR     Time Frame (Gait Training Goal 1, PT)  2 weeks  -     Row Name 11/15/19 0900          Positioning and Restraints    Pre-Treatment Position  sitting in chair/recliner  -JR     Post Treatment Position  chair  -JR     In Chair  notified nsg;sitting;call light within reach;encouraged to call for assist;exit alarm on  -JR       User Key  (r) = Recorded By, (t) = Taken By, (c) = Cosigned By    Initials Name Provider Type     Joanie Mckenna, PT Physical Therapist        Physical Therapy Education     Title: PT OT SLP Therapies (Done)     Topic: Physical Therapy (Done)     Point: Mobility training (Done)     Learning Progress Summary           Patient Acceptance, TB,D, MICHELE,DU by  at 11/15/2019  9:22 AM    Comment:  demonstrates good use of walker and understands need to call for assist for gait and transfers for safety                               User Key     Initials Effective Dates Name Provider Type Discipline     06/10/19 -  Joanie Mckenna, PT Physical Therapist PT              PT Recommendation and Plan  Anticipated Discharge Disposition (PT): home with home health, home with assist  Planned Therapy Interventions (PT Eval): bed mobility training, gait training, home exercise program, stair training, strengthening, transfer training  Therapy Frequency (PT Clinical Impression): 3 times/wk  Outcome Summary/Treatment Plan (PT)  Anticipated Discharge Disposition (PT): home with home health, home with assist  Plan of Care Reviewed With: patient  Progress: no change  Outcome Summary: patient is very slow moving for self care, transfers and gait.  She ambulates in hallway with sba and rolling walker.  she needs some additional time and assist for safety with activities.   She lives alone and has multiple friends to assist .  She would benefit from HH to continue to increase strength and decrease fall risk.  Will continue to see at hospital until DC home     Time Calculation:   PT Charges     Row Name 11/15/19 0926             Time Calculation    Start Time  0850  -JR      Stop Time  0916  -JR      Time Calculation (min)  26 min  -JR      PT Received On  11/15/19  -JR      PT - Next Appointment  11/18/19  -      PT Goal Re-Cert Due Date  11/29/19  -JR        User Key  (r) = Recorded By, (t) = Taken By, (c) = Cosigned By    Initials Name Provider Type    Joanie Cao, PT Physical Therapist        Therapy Charges for Today     Code Description Service Date Service Provider Modifiers Qty    93931938567 HC PT EVAL HIGH COMPLEXITY 4 11/15/2019 Joanie Mckenna, PT GP 1                 Joanie Mckenna, PT  11/15/2019

## 2019-11-15 NOTE — PLAN OF CARE
Problem: Patient Care Overview  Goal: Plan of Care Review  Outcome: Ongoing (interventions implemented as appropriate)   11/15/19 0412   Coping/Psychosocial   Plan of Care Reviewed With patient     Goal: Discharge Needs Assessment  Outcome: Ongoing (interventions implemented as appropriate)    Goal: Interprofessional Rounds/Family Conf  Outcome: Ongoing (interventions implemented as appropriate)      Problem: Fall Risk (Adult)  Goal: Identify Related Risk Factors and Signs and Symptoms  Outcome: Ongoing (interventions implemented as appropriate)    Goal: Absence of Fall  Outcome: Ongoing (interventions implemented as appropriate)      Problem: Pain, Acute (Adult)  Goal: Identify Related Risk Factors and Signs and Symptoms  Outcome: Ongoing (interventions implemented as appropriate)    Goal: Acceptable Pain Control/Comfort Level  Outcome: Ongoing (interventions implemented as appropriate)      Problem: Skin Injury Risk (Adult)  Goal: Identify Related Risk Factors and Signs and Symptoms  Outcome: Ongoing (interventions implemented as appropriate)    Goal: Skin Health and Integrity  Outcome: Ongoing (interventions implemented as appropriate)      Problem: Urinary Tract Infection (Adult)  Goal: Signs and Symptoms of Listed Potential Problems Will be Absent, Minimized or Managed (Urinary Tract Infection)  Outcome: Ongoing (interventions implemented as appropriate)

## 2019-11-15 NOTE — PLAN OF CARE
Problem: Patient Care Overview  Goal: Plan of Care Review  Outcome: Ongoing (interventions implemented as appropriate)   11/15/19 8349   Coping/Psychosocial   Plan of Care Reviewed With patient   OTHER   Outcome Summary patient is very slow moving for self care, transfers and gait. She ambulates in hallway with sba and rolling walker. she needs some additional time and assist for safety with activities. She lives alone and has multiple friends to assist . She would benefit from HH to continue to increase strength and decrease fall risk. Will continue to see at hospital until DC home   Plan of Care Review   Progress no change

## 2019-11-15 NOTE — PROGRESS NOTES
Continued Stay Note   [facility]     Patient Name: Marlena Avilez  MRN: [id number]  Today's Date: 11/15/2019    Admit Date: 11/13/2019    Discharge Plan     Row Name 11/15/19 4858       Plan    Plan  Home with [facility] - pt has told [facility] she refuses HH services.    Plan Comments  PT recommended home health. Pt has home O2 and nebulizer at home.    Row Name 11/15/19 7903                   Misti Bridges RN

## 2019-11-16 VITALS
RESPIRATION RATE: 16 BRPM | WEIGHT: 198.85 LBS | HEART RATE: 78 BPM | SYSTOLIC BLOOD PRESSURE: 106 MMHG | BODY MASS INDEX: 33.95 KG/M2 | TEMPERATURE: 97.6 F | HEIGHT: 64 IN | OXYGEN SATURATION: 92 % | DIASTOLIC BLOOD PRESSURE: 71 MMHG

## 2019-11-16 LAB
BACTERIA SPEC AEROBE CULT: ABNORMAL
GLUCOSE BLDC GLUCOMTR-MCNC: 119 MG/DL (ref 70–105)
GLUCOSE BLDC GLUCOMTR-MCNC: 156 MG/DL (ref 70–105)
GLUCOSE BLDC GLUCOMTR-MCNC: 94 MG/DL (ref 70–105)

## 2019-11-16 PROCEDURE — 94799 UNLISTED PULMONARY SVC/PX: CPT

## 2019-11-16 PROCEDURE — 96365 THER/PROPH/DIAG IV INF INIT: CPT

## 2019-11-16 PROCEDURE — 82962 GLUCOSE BLOOD TEST: CPT

## 2019-11-16 PROCEDURE — 63710000001 INSULIN LISPRO (HUMAN) PER 5 UNITS: Performed by: NURSE PRACTITIONER

## 2019-11-16 PROCEDURE — 63710000001 INSULIN GLARGINE PER 5 UNITS: Performed by: NURSE PRACTITIONER

## 2019-11-16 PROCEDURE — 99217 PR OBSERVATION CARE DISCHARGE MANAGEMENT: CPT | Performed by: HOSPITALIST

## 2019-11-16 PROCEDURE — 25010000002 CEFTRIAXONE PER 250 MG: Performed by: NURSE PRACTITIONER

## 2019-11-16 PROCEDURE — G0378 HOSPITAL OBSERVATION PER HR: HCPCS

## 2019-11-16 RX ORDER — NITROFURANTOIN 25; 75 MG/1; MG/1
100 CAPSULE ORAL 2 TIMES DAILY
Qty: 10 CAPSULE | Refills: 0 | Status: SHIPPED | OUTPATIENT
Start: 2019-11-16 | End: 2020-01-14

## 2019-11-16 RX ADMIN — NALOXEGOL OXALATE 25 MG: 25 TABLET, FILM COATED ORAL at 06:10

## 2019-11-16 RX ADMIN — INSULIN LISPRO 5 UNITS: 100 INJECTION, SOLUTION INTRAVENOUS; SUBCUTANEOUS at 12:27

## 2019-11-16 RX ADMIN — CEFTRIAXONE SODIUM 1 G: 1 INJECTION, POWDER, FOR SOLUTION INTRAMUSCULAR; INTRAVENOUS at 01:39

## 2019-11-16 RX ADMIN — PANTOPRAZOLE SODIUM 40 MG: 40 TABLET, DELAYED RELEASE ORAL at 06:10

## 2019-11-16 RX ADMIN — LEVOTHYROXINE SODIUM 50 MCG: 50 TABLET ORAL at 06:10

## 2019-11-16 RX ADMIN — LUBIPROSTONE 24 MCG: 24 CAPSULE, GELATIN COATED ORAL at 09:24

## 2019-11-16 RX ADMIN — SODIUM CHLORIDE 100 ML/HR: 900 INJECTION, SOLUTION INTRAVENOUS at 10:51

## 2019-11-16 RX ADMIN — HYDROCODONE BITARTRATE AND ACETAMINOPHEN 1 TABLET: 10; 325 TABLET ORAL at 06:10

## 2019-11-16 RX ADMIN — FAMOTIDINE 20 MG: 20 TABLET ORAL at 08:10

## 2019-11-16 RX ADMIN — BUPROPION HYDROCHLORIDE 150 MG: 150 TABLET, EXTENDED RELEASE ORAL at 06:10

## 2019-11-16 RX ADMIN — INSULIN LISPRO 2 UNITS: 100 INJECTION, SOLUTION INTRAVENOUS; SUBCUTANEOUS at 08:09

## 2019-11-16 RX ADMIN — HYDROCODONE BITARTRATE AND ACETAMINOPHEN 1 TABLET: 10; 325 TABLET ORAL at 12:28

## 2019-11-16 RX ADMIN — METOPROLOL TARTRATE 25 MG: 25 TABLET ORAL at 08:10

## 2019-11-16 RX ADMIN — FUROSEMIDE 40 MG: 40 TABLET ORAL at 08:10

## 2019-11-16 RX ADMIN — BUDESONIDE AND FORMOTEROL FUMARATE DIHYDRATE 2 PUFF: 160; 4.5 AEROSOL RESPIRATORY (INHALATION) at 07:49

## 2019-11-16 RX ADMIN — VENLAFAXINE HYDROCHLORIDE 300 MG: 75 CAPSULE, EXTENDED RELEASE ORAL at 08:09

## 2019-11-16 RX ADMIN — INSULIN GLARGINE 14 UNITS: 100 INJECTION, SOLUTION SUBCUTANEOUS at 06:10

## 2019-11-16 RX ADMIN — INSULIN LISPRO 5 UNITS: 100 INJECTION, SOLUTION INTRAVENOUS; SUBCUTANEOUS at 08:11

## 2019-11-16 RX ADMIN — Medication 10 ML: at 08:11

## 2019-11-16 NOTE — DISCHARGE SUMMARY
[facility] Medicine Services  DISCHARGE SUMMARY        Prepared For PCP:  Dottie Nunez, APRN        Date of Admission:   11/13/2019    Date of Discharge:  11/16/2019    Length of stay:  LOS: 0 days     Reason For Admission:    Present on Admission:  • Acute UTI (urinary tract infection)  • Chronic obstructive pulmonary disease (CMS/HCC)  • Current every day smoker  • Generalized anxiety disorder  • Hyperlipidemia  • Hypertension  • Hypothyroidism  • Insomnia due to mental disorder  • Type 2 diabetes mellitus (CMS/HCC)  • Somnolence  • Acute urinary retention  • Chronic idiopathic constipation  • Periumbilical abdominal pain          Principal and Active Diagnosis During Hospital Stay:        Active Hospital Problems    Diagnosis  POA   • **Acute UTI (urinary tract infection) [N39.0]  Yes   • Chronic idiopathic constipation [K59.04]  Yes   • Periumbilical abdominal pain [R10.33]  Yes   • Somnolence [R40.0]  Yes   • Polypharmacy [Z79.899]  Not Applicable   • Acute urinary retention [R33.8]  Yes   • Current every day smoker [F17.200]  Yes   • Hypertension [I10]  Yes   • Generalized anxiety disorder [F41.1]  Yes   • Insomnia due to mental disorder [F51.05]  Yes   • Hypothyroidism [E03.9]  Yes   • Hyperlipidemia [E78.5]  Yes   • Type 2 diabetes mellitus (CMS/HCC) [E11.9]  Yes   • Chronic obstructive pulmonary disease (CMS/HCC) [J44.9]  Yes      Resolved Hospital Problems   No resolved problems to display.             Hospital Course:  64 year old female admitted with lower abdominal discomfort now resolved completely. Patient has ct scan abdomen on 11/12 which has revealed urinary retention, pt now has pacheco catheter in place, urology service advise discharge with pacheco catheter with follow up in office. Pt urine culture came out positive for E.coli, treated with iv rocephin, changed to macrobid on discharge to follow with her family physician in a week time. Realizing physical  deconditioning, pt advised rehab but pt refused. She wanted to go home with home health care.    Condition on discharge stable.    Spend greater than 30 minutes in arranging for discharge.      Recommendation for Outpatient Providers:             Reasons For Change In Medications and Indications for New Medications:          Discharge Disposition      Home or Self Care       Discharge Medications      New Medications      Instructions Start Date   nitrofurantoin (macrocrystal-monohydrate) 100 MG capsule  Commonly known as:  MACROBID   100 mg, Oral, 2 Times Daily         Continue These Medications      Instructions Start Date   albuterol (2.5 MG/3ML) 0.083% nebulizer solution  Commonly known as:  PROVENTIL   2.5 mg, Nebulization, Every 4 Hours PRN      ALPRAZolam 0.5 MG tablet  Commonly known as:  XANAX   0.5 mg, Oral, 2 Times Daily PRN      atorvastatin 10 MG tablet  Commonly known as:  LIPITOR   10 mg, Oral, Daily      buPROPion  MG 24 hr tablet  Commonly known as:  WELLBUTRIN XL   150 mg, Oral, Every Morning      cetirizine 10 MG tablet  Commonly known as:  zyrTEC   10 mg, Oral, Daily      cimetidine 400 MG tablet  Commonly known as:  TAGAMET   400 mg, Oral, 2 Times Daily PRN      cyclobenzaprine 10 MG tablet  Commonly known as:  FLEXERIL   10 mg, Oral, 3 Times Daily PRN      diclofenac 75 MG EC tablet  Commonly known as:  VOLTAREN   75 mg, Oral, 2 Times Daily      fluticasone-salmeterol 250-50 MCG/DOSE DISKUS  Commonly known as:  ADVAIR   1 puff, Inhalation, 2 Times Daily - RT      furosemide 40 MG tablet  Commonly known as:  LASIX   40 mg, Oral, Daily      HYDROcodone-acetaminophen  MG per tablet  Commonly known as:  NORCO   1 tablet, Oral, Every 6 Hours PRN      icosapent ethyl 1 g capsule capsule  Commonly known as:  VASCEPA   2 g, Oral, Daily      insulin aspart 100 UNIT/ML solution pen-injector sc pen  Commonly known as:  novoLOG FLEXPEN   5 Units, Subcutaneous, 3 Times Daily With Meals       insulin degludec 100 UNIT/ML solution pen-injector injection  Commonly known as:  TRESIBA FLEXTOUCH   14 Units, Subcutaneous, Daily      levothyroxine 50 MCG tablet  Commonly known as:  SYNTHROID, LEVOTHROID   50 mcg, Oral, Daily      lubiprostone 24 MCG capsule  Commonly known as:  AMITIZA   24 mcg, Oral, Daily With Breakfast      metoprolol tartrate 25 MG tablet  Commonly known as:  LOPRESSOR   25 mg, Oral, 2 Times Daily      MULTIPLE VITAMINS ESSENTIAL PO   1 tablet, Oral, Daily      nitroglycerin 0.4 MG SL tablet  Commonly known as:  NITROSTAT   0.4 mg, Sublingual, Every 5 Minutes PRN, Take no more than 3 doses in 15 minutes.       pantoprazole 40 MG EC tablet  Commonly known as:  PROTONIX   40 mg, Oral, Daily      prazosin 1 MG capsule  Commonly known as:  MINIPRESS   Take one capsule at bedtime for nightmares.      pregabalin 100 MG capsule  Commonly known as:  LYRICA   100 mg, Oral, 2 Times Daily      SITagliptin-metFORMIN HCl ER  MG tablet  Commonly known as:  JANUMET XR   2 tablets, Oral, Daily      traZODone 100 MG tablet  Commonly known as:  DESYREL   Take two tablets at bedtime for sleep      venlafaxine  MG 24 hr capsule  Commonly known as:  EFFEXOR-XR   300 mg, Oral, Daily                   Test Results Pending at Discharge            Procedures Performed         Vital Signs    Temp:  [97.3 °F (36.3 °C)-97.6 °F (36.4 °C)] 97.6 °F (36.4 °C)  Heart Rate:  [76-86] 78  Resp:  [14-18] 16  BP: (101-110)/(66-71) 106/71    Physical Exam:    Physical Exam   Constitutional: She is oriented to person, place, and time. She appears well-developed and well-nourished. No distress.   HENT:   Head: Normocephalic and atraumatic.   Right Ear: External ear normal.   Left Ear: External ear normal.   Nose: Nose normal.   Mouth/Throat: Oropharynx is clear and moist. No oropharyngeal exudate.   Eyes: Conjunctivae and EOM are normal. Pupils are equal, round, and reactive to light. Right eye exhibits no  discharge. Left eye exhibits no discharge. No scleral icterus.   Neck: Normal range of motion. No JVD present. No tracheal deviation present. No thyromegaly present.   Cardiovascular: Normal rate, regular rhythm, normal heart sounds and intact distal pulses. Exam reveals no gallop and no friction rub.   No murmur heard.  Pulmonary/Chest: Effort normal and breath sounds normal. No stridor. No respiratory distress. She has no wheezes. She has no rales. She exhibits no tenderness.   Abdominal: Soft. Bowel sounds are normal. She exhibits no distension and no mass. There is no tenderness. There is no rebound and no guarding. No hernia.   Musculoskeletal: Normal range of motion. She exhibits no edema, tenderness or deformity.   Lymphadenopathy:     She has no cervical adenopathy.   Neurological: She is alert and oriented to person, place, and time. No cranial nerve deficit or sensory deficit. She exhibits normal muscle tone. Coordination normal.   Skin: Skin is warm and dry. No rash noted. She is not diaphoretic. No erythema.   Psychiatric: She has a normal mood and affect. Her behavior is normal.   Nursing note and vitals reviewed.        Consults:   Consults     Date and Time Order Name Status Description    11/14/2019 1109 Inpatient Urology Consult Completed     11/14/2019 0155 Inpatient Hospitalist Consult Completed           Pertinent Test Results:     Results from last 7 days   Lab Units 11/14/19  0607 11/14/19  0019 11/12/19  1726   WBC 10*3/mm3 8.50 10.10 10.70   HEMOGLOBIN g/dL 12.3 13.5 14.4   HEMATOCRIT % 36.0 40.7 41.4   PLATELETS 10*3/mm3 206 221 223     Results from last 7 days   Lab Units 11/14/19  0607 11/14/19  0019 11/12/19  1726   SODIUM mmol/L 143 138 137   POTASSIUM mmol/L 4.3 4.1 4.1   CHLORIDE mmol/L 102 97* 96*   CO2 mmol/L 30.0* 31.0* 27.0   BUN mg/dL 22 24* 23   CREATININE mg/dL 0.95 0.93 1.07*   GLUCOSE mg/dL 143* 144* 111*   CALCIUM mg/dL 9.2 9.7 10.0     Results from last 7 days   Lab Units  11/14/19  0607 11/14/19  0019 11/12/19  1726   SODIUM mmol/L 143 138 137   POTASSIUM mmol/L 4.3 4.1 4.1   CHLORIDE mmol/L 102 97* 96*   CO2 mmol/L 30.0* 31.0* 27.0   BUN mg/dL 22 24* 23   CREATININE mg/dL 0.95 0.93 1.07*   CALCIUM mg/dL 9.2 9.7 10.0   BILIRUBIN mg/dL  --  <0.2*  --    ALK PHOS U/L  --  114  --    ALT (SGPT) U/L  --  16  --    AST (SGOT) U/L  --  14  --    GLUCOSE mg/dL 143* 144* 111*         Lab Results   Component Value Date    CALCIUM 9.2 11/14/2019     Hemoglobin A1C   Date Value Ref Range Status   11/14/2019 7.1 (H) 3.5 - 5.6 % Final             Microbiology Results (last 10 days)     Procedure Component Value - Date/Time    Urine Culture - Urine, Urine, Clean Catch [[id number]]  (Abnormal)  (Susceptibility) Collected:  11/14/19 0126    Lab Status:  Final result Specimen:  Urine, Clean Catch Updated:  11/16/19 0319     Urine Culture >100,000 CFU/mL Escherichia coli    Susceptibility      Escherichia coli     LUZMA     Ampicillin Resistant     Ampicillin + Sulbactam Intermediate     Cefazolin Susceptible     Cefepime Susceptible     Ceftazidime Susceptible     Ceftriaxone Susceptible     Gentamicin Susceptible     Levofloxacin Susceptible     Nitrofurantoin Susceptible     Piperacillin + Tazobactam Susceptible     Tetracycline Resistant     Trimethoprim + Sulfamethoxazole Resistant                          ECG/EMG Results (most recent)     None                    Ct Abdomen Pelvis Without Contrast    Result Date: 11/12/2019   1. Distention of the bladder which was present on previous remote study from October 2017 which may be chronic. Correlate with findings of bowel obstruction. A small amount of air is present within the bladder lumen which may related to recent catheterization. Recommend clinical correlation. Otherwise, no acute process. 2. Nonvisualization of the appendix, similar as compared to the previous study which may related to previous resection. 3. Ancillary findings as described  above.    Electronically Signed By-Jodee Gregory On:11/12/2019 6:03 PM This report was finalized on [id number] by  Jodee Gregory, .          Condition on Discharge:      Stable    Discharge Diet:     Activity at Discharge:     Follow-up Appointments  Future Appointments   Date Time Provider Department Center   11/26/2019  2:30 PM Ansley Hernandez APRN [facility] None   12/30/2019  8:45 AM Donnell Calderon MD [facility] None   1/21/2020  9:45 AM Erin Avilez PA-C [facility] None     Additional Instructions for the Follow-ups that You Need to Schedule     Ambulatory Referral to Home Health   As directed      Face to Face Visit Date:  11/15/2019    Follow-up provider for Plan of Care?:  I treated the patient in an acute care facility and will not continue treatment after discharge.    Follow-up provider:  MATT URBANO [[id number]]    Reason/Clinical Findings:  UTI    Describe mobility limitations that make leaving home difficult:  Weakness    Nursing/Therapeutic Services Requested:  Other (eval and treat)    Frequency:  1 Week 1                 An Ordaz MD  11/16/19  12:39 PM    Time: I spent  32  minutes on this discharge activity which included face-to-face encounter with the patient/reviewing the data in the system/coordination of the care with the nursing staff as well as consultants/documentation/entering orders.

## 2019-11-17 ENCOUNTER — READMISSION MANAGEMENT (OUTPATIENT)
Dept: CALL CENTER | Facility: HOSPITAL | Age: 64
End: 2019-11-17

## 2019-11-17 NOTE — OUTREACH NOTE
Prep Survey      Responses   Facility patient discharged from?  [facility]   Is patient eligible?  Yes   Discharge diagnosis  Acute UTI,    Acute urinary retention    Does the patient have one of the following disease processes/diagnoses(primary or secondary)?  Other   Does the patient have Home health ordered?  No [HH ordered but patient has refused]   Is there a DME ordered?  No   Prep survey completed?  Yes          Nikki Pascal RN

## 2019-11-18 NOTE — PROGRESS NOTES
Continued Stay Note   [facility]     Patient Name: Marlena Avilez  MRN: [id number]  Today's Date: 11/18/2019    Admit Date: 11/13/2019           Plan    Final Discharge Disposition Code  06 - home with home health care    Final Note  home with [facility] home health       Carol naegele rn  Case management  Office number [phone]  Cell phone [phone]

## 2019-11-19 ENCOUNTER — READMISSION MANAGEMENT (OUTPATIENT)
Dept: CALL CENTER | Facility: HOSPITAL | Age: 64
End: 2019-11-19

## 2019-11-19 NOTE — OUTREACH NOTE
Medical Week 1 Survey      Responses   Facility patient discharged from?  [facility]   Does the patient have one of the following disease processes/diagnoses(primary or secondary)?  Other   Is there a successful TCM telephone encounter documented?  No   Week 1 attempt successful?  Yes   Call start time  1316   Call end time  1319   Discharge diagnosis  Acute UTI,    Acute urinary retention    Meds reviewed with patient/caregiver?  Yes   Is the patient having any side effects they believe may be caused by any medication additions or changes?  No   Does the patient have all medications ordered at discharge?  Yes   Is the patient taking all medications as directed (includes completed medication regime)?  Yes   Does the patient have a primary care provider?   Yes   Does the patient have an appointment with their PCP within 7 days of discharge?  Greater than 7 days   What is preventing the patient from scheduling follow up appointments within 7 days of discharge?  Earlier appointment not available   Has the patient kept scheduled appointments due by today?  N/A   Comments  Patient needing a follow up with Dr. Lopez.  Voicemail left for office regarding this.   Has home health visited the patient within 72 hours of discharge?  N/A   Did the patient receive a copy of their discharge instructions?  Yes   Nursing interventions  Reviewed instructions with patient   What is the patient's perception of their health status since discharge?  Improving   Is the patient/caregiver able to teach back signs and symptoms related to disease process for when to call PCP?  Yes   Is the patient/caregiver able to teach back signs and symptoms related to disease process for when to call 911?  Yes   Is the patient/caregiver able to teach back the hierarchy of who to call/visit for symptoms/problems? PCP, Specialist, Home health nurse, Urgent Care, ED, 911  Yes   Additional teach back comments  Patient is doing well but needing an appt with   [facility]. Voicemail left for office regarding this.   Week 1 call completed?  Yes   Wrap up additional comments  Dr. Lopez's office to return call with an appt.          Deidre Lozano LPN     Appointment with Dr. Lopez for Nov 26 @12:00. Left message with appt date and time

## 2019-11-22 NOTE — PROGRESS NOTES
Case Management Discharge Note      Final Note: Pt refused [facility]           Final Discharge Disposition Code: 01 - home or self-care

## 2019-12-02 RX ORDER — ARIPIPRAZOLE 15 MG/1
TABLET ORAL
Qty: 30 TABLET | Refills: 3 | Status: SHIPPED | OUTPATIENT
Start: 2019-12-02 | End: 2020-03-23

## 2019-12-03 RX ORDER — ISOSORBIDE MONONITRATE 30 MG/1
TABLET, EXTENDED RELEASE ORAL
Qty: 30 TABLET | Refills: 5 | Status: SHIPPED | OUTPATIENT
Start: 2019-12-03 | End: 2020-04-21

## 2019-12-30 ENCOUNTER — OFFICE VISIT (OUTPATIENT)
Dept: PAIN MEDICINE | Facility: CLINIC | Age: 64
End: 2019-12-30

## 2019-12-30 VITALS
OXYGEN SATURATION: 97 % | HEART RATE: 92 BPM | SYSTOLIC BLOOD PRESSURE: 153 MMHG | WEIGHT: 180 LBS | BODY MASS INDEX: 30.73 KG/M2 | RESPIRATION RATE: 16 BRPM | HEIGHT: 64 IN | DIASTOLIC BLOOD PRESSURE: 86 MMHG | TEMPERATURE: 97.9 F

## 2019-12-30 DIAGNOSIS — G89.29 CHRONIC MIDLINE LOW BACK PAIN WITH SCIATICA, SCIATICA LATERALITY UNSPECIFIED: ICD-10-CM

## 2019-12-30 DIAGNOSIS — M54.16 LUMBAR RADICULOPATHY: ICD-10-CM

## 2019-12-30 DIAGNOSIS — M79.605 LEG PAIN, BILATERAL: ICD-10-CM

## 2019-12-30 DIAGNOSIS — M54.40 CHRONIC MIDLINE LOW BACK PAIN WITH SCIATICA, SCIATICA LATERALITY UNSPECIFIED: ICD-10-CM

## 2019-12-30 DIAGNOSIS — Z79.899 OTHER LONG TERM (CURRENT) DRUG THERAPY: ICD-10-CM

## 2019-12-30 DIAGNOSIS — F19.90 CURRENT DRUG USE: Primary | ICD-10-CM

## 2019-12-30 DIAGNOSIS — M47.816 LUMBAR SPONDYLOSIS: ICD-10-CM

## 2019-12-30 DIAGNOSIS — M54.40 LUMBAGO OF LUMBAR REGION WITH SCIATICA: ICD-10-CM

## 2019-12-30 DIAGNOSIS — M54.12 CERVICAL RADICULOPATHY: ICD-10-CM

## 2019-12-30 DIAGNOSIS — M79.604 LEG PAIN, BILATERAL: ICD-10-CM

## 2019-12-30 DIAGNOSIS — M54.2 NECK PAIN: ICD-10-CM

## 2019-12-30 DIAGNOSIS — K59.03 CONSTIPATION DUE TO PAIN MEDICATION: Primary | ICD-10-CM

## 2019-12-30 DIAGNOSIS — F51.05 INSOMNIA DUE TO MENTAL DISORDER: ICD-10-CM

## 2019-12-30 DIAGNOSIS — M48.061 SPINAL STENOSIS OF LUMBAR REGION, UNSPECIFIED WHETHER NEUROGENIC CLAUDICATION PRESENT: ICD-10-CM

## 2019-12-30 PROCEDURE — G0463 HOSPITAL OUTPT CLINIC VISIT: HCPCS | Performed by: PHYSICAL MEDICINE & REHABILITATION

## 2019-12-30 PROCEDURE — 99213 OFFICE O/P EST LOW 20 MIN: CPT | Performed by: PHYSICAL MEDICINE & REHABILITATION

## 2019-12-30 RX ORDER — HYDROCODONE BITARTRATE AND ACETAMINOPHEN 10; 325 MG/1; MG/1
1 TABLET ORAL EVERY 6 HOURS PRN
Qty: 120 TABLET | Refills: 0 | Status: SHIPPED | OUTPATIENT
Start: 2019-12-30 | End: 2020-03-05 | Stop reason: SDUPTHER

## 2019-12-30 RX ORDER — PRAZOSIN HYDROCHLORIDE 1 MG/1
CAPSULE ORAL
Qty: 30 CAPSULE | Refills: 2 | Status: SHIPPED | OUTPATIENT
Start: 2019-12-30 | End: 2020-01-21

## 2019-12-30 RX ORDER — TRAZODONE HYDROCHLORIDE 100 MG/1
TABLET ORAL
Qty: 60 TABLET | Refills: 2 | Status: SHIPPED | OUTPATIENT
Start: 2019-12-30 | End: 2020-01-21

## 2019-12-30 RX ORDER — HYDROCODONE BITARTRATE AND ACETAMINOPHEN 10; 325 MG/1; MG/1
1 TABLET ORAL EVERY 6 HOURS PRN
Qty: 120 TABLET | Refills: 0 | Status: SHIPPED | OUTPATIENT
Start: 2019-12-30 | End: 2019-12-30 | Stop reason: SDUPTHER

## 2019-12-30 NOTE — PROGRESS NOTES
Subjective   Marlena Avilez is a 64 y.o. female.     Low back and yen. legs pain,   Lumbar Back Pain with BLLE Pain,  RLE Numbness and RLE Weakness,  Lumbar Back Pain is increased with prolonged sitting.  Patient planned Lumbar Epidural #1 with Dr. Pleitez, reports very little relief with LESIs. Pain is 9/10 at best, aching, throbbing, worse with bending and standing, interferes with ADLs, activity, failed injections, meds. MRI L-spine with laminectomy and fusion. Taking Zohydro 15mg BID with Dr. Pleitez with poor relief, Norco 10mg TID with PCP with poor relief, had good relief in past with Percocet 10mg TID prn, restarted, but less relief than Hydrocodone, worsening pain. Current patient of this clinic. Rotated to Norco 10mg QID prn, working well. C/o constipation controlled by fiber supplement.       The following portions of the patient's history were reviewed and updated as appropriate: allergies, current medications, past family history, past medical history, past social history, past surgical history and problem list.    Review of Systems   Constitutional: Negative for chills, fatigue and fever.   HENT: Positive for hearing loss. Negative for trouble swallowing.    Eyes: Positive for visual disturbance.   Respiratory: Positive for shortness of breath.    Cardiovascular: Negative for chest pain.   Gastrointestinal: Negative for abdominal pain, constipation, diarrhea, nausea and vomiting.   Genitourinary: Negative for urinary incontinence.   Musculoskeletal: Negative for arthralgias, back pain, joint swelling, myalgias and neck pain.   Neurological: Positive for headache. Negative for dizziness, weakness and numbness.       Objective   Physical Exam   Constitutional: She is oriented to person, place, and time. She appears well-developed and well-nourished.   HENT:   Head: Normocephalic and atraumatic.   Eyes: Pupils are equal, round, and reactive to light. EOM are normal.   Neck: Normal range of motion.    Cardiovascular: Normal rate, regular rhythm, normal heart sounds and intact distal pulses.   Pulmonary/Chest: Breath sounds normal.   Abdominal: Soft. Bowel sounds are normal. She exhibits no distension. There is no tenderness.   Musculoskeletal:   Strength 4/5 globally   Neurological: She is alert and oriented to person, place, and time. She has normal strength and normal reflexes. She displays normal reflexes. No sensory deficit.   Psychiatric: She has a normal mood and affect. Her behavior is normal. Thought content normal.         Assessment/Plan   Marlena was seen today for back pain.    Diagnoses and all orders for this visit:    Constipation due to pain medication    Cervical radiculopathy    Leg pain, bilateral    Chronic midline low back pain with sciatica, sciatica laterality unspecified    Lumbago of lumbar region with sciatica    Lumbar radiculopathy    Neck pain    Lumbar spondylosis    Other long term (current) drug therapy    Spinal stenosis of lumbar region, unspecified whether neurogenic claudication present        Inspect reviewed, in order. UDS 2/22/19 in order.  Stopped Zohydro, Norco. Failed Percocet 10mg TID prn which has worked well in past, cont Zanaflex 2mg qHS prn.   Began Norco 10mg QID prn, working well.  Cont fiber supplement, will start [facility]IC med in future if necessary.  RTC 3 months for f/u.

## 2020-01-14 ENCOUNTER — OFFICE VISIT (OUTPATIENT)
Dept: CARDIOLOGY | Facility: CLINIC | Age: 65
End: 2020-01-14

## 2020-01-14 VITALS
WEIGHT: 183 LBS | BODY MASS INDEX: 31.24 KG/M2 | OXYGEN SATURATION: 95 % | DIASTOLIC BLOOD PRESSURE: 74 MMHG | HEIGHT: 64 IN | SYSTOLIC BLOOD PRESSURE: 117 MMHG | HEART RATE: 93 BPM

## 2020-01-14 DIAGNOSIS — F51.05 INSOMNIA DUE TO MENTAL DISORDER: ICD-10-CM

## 2020-01-14 DIAGNOSIS — F41.1 GENERALIZED ANXIETY DISORDER: ICD-10-CM

## 2020-01-14 DIAGNOSIS — I10 ESSENTIAL HYPERTENSION: ICD-10-CM

## 2020-01-14 DIAGNOSIS — E11.9 TYPE 2 DIABETES MELLITUS WITHOUT COMPLICATION, UNSPECIFIED WHETHER LONG TERM INSULIN USE (HCC): ICD-10-CM

## 2020-01-14 DIAGNOSIS — R00.2 PALPITATIONS: Primary | ICD-10-CM

## 2020-01-14 DIAGNOSIS — J44.9 CHRONIC OBSTRUCTIVE PULMONARY DISEASE, UNSPECIFIED COPD TYPE (HCC): ICD-10-CM

## 2020-01-14 PROCEDURE — 99213 OFFICE O/P EST LOW 20 MIN: CPT | Performed by: NURSE PRACTITIONER

## 2020-01-14 NOTE — PROGRESS NOTES
[facility] CARDIOLOGY      REASON FOR FOLLOW-UP:  Coronary artery disease  Diabetes mellitus type 2  Dyslipidemia  Hypertension with hypertensive cardiovascular disease          Chief Complaint   Patient presents with   • COPD     Followup from 4/2019  No cardiac complaints - occasional skipped heart beat   • Diabetes   • Hypertension   • Hyperlipidemia         Dear April,    History of Present Illness     It was my pleasure to see Marlena in the office today.  As you are aware, she is a very pleasant 64-year-old  female with a history of mild to moderate coronary occlusive disease as well as chronic obstructive pulmonary disease, nocturnal   hypoxemia, diabetes mellitus, anxiety, depression, dyslipidemia and hypertension with hypertensive cardiovascular disease.  She presents today for follow-up on the above conditions.     Today, Marlena reports that she feels well.  Specifically, she denies any chest pains, pressure or tightness.  She has occasional, rare episodes of palpitations that are not particularly bothersome to her.  She denies any PND  Or orthopnea.  She denies any shortness of breath out of character.   She denies lower extremity edema, dizziness or lightheadedness.    Patient is intentionally trying to lose weight and has recently lost about 20 pounds, stating she noticed improvement in her breathing.  EKG today shows normal sinus rhythm. Her blood pressure is under excellent control at 117/74.  Recent lipid profile with triglycerides much improved at 171 (306), HDL 47,         Assessment:  1. Chest pain. Cardiac catheterization April 2017 demonstrated only mild-to-moderate occlusive disease  2. Chronic obstructive pulmonary disease  3. Nocturnal hypoxemia  4. Diabetes mellitus type 2  5. History of dyslipidemia  6. History of hypertension  7. Palpitations.  8. Tobacco abuse disorder.     Recommendations  1. Continuation of her current medical regimen at the  present time.  2.  Follow-up in 6 months time sooner should there be difficulties.  3.  Recommend complete smoking cessation       The following portions of the patient's history were reviewed and updated as appropriate: allergies, current medications, past family history, past medical history, past social history, past surgical history and problem list.    REVIEW OF SYSTEMS:    Review of Systems   Cardiovascular: Positive for palpitations.   Respiratory:        Dyspnea with exertion   All other systems reviewed and are negative.      Vitals:    01/14/20 1443   BP: 117/74   Pulse: 93   SpO2: 95%         PHYSICAL EXAM:    General: Alert, cooperative, no distress, appears stated age  Head:  Normocephalic, atraumatic, mucous membranes moist  Eyes:  Conjunctiva/corneas clear, EOM's intact     Neck:  Supple,  no JVD or bruit     Lungs: Clear to auscultation bilaterally, no wheezes rhonchi rales are noted  Chest wall: No tenderness  Musculoskeletal:   Ambulates freely without assistance  Heart::  Regular rate and rhythm, S1 and S2 normal, no murmur, rub or gallop  Abdomen: Soft, non-tender, nondistended bowel sounds active, no abdominal bruit  Extremities: No cyanosis, clubbing, or edema   Pulses: 2+ and symmetric all extremities  Skin:  No rashes or lesions  Neuro/psych: A&O x3. CN II through XII are grossly intact with flat affect        Past Medical History:   Diagnosis Date   • Anemia    • Anxiety disorder    • Arthritis    • Atrial fibrillation (CMS/HCC)     Proxysmal   • Chronic kidney disease    • COPD (chronic obstructive pulmonary disease) (CMS/HCC)    • Dark stools    • Depression    • Disease of thyroid gland    • GERD (gastroesophageal reflux disease)    • History of hernia repair    • Hyperlipidemia    • Hypertension    • IBS (irritable bowel syndrome)    • Liver disease    • Low back pain    • Obesity    • Seizure disorder (CMS/HCC)    • Type 2 diabetes mellitus (CMS/HCC)    • Weight gain        Past  Surgical History:   Procedure Laterality Date   • BACK SURGERY     • CARDIAC CATHETERIZATION      [facility], 3-16   • CHOLECYSTECTOMY     • ENDOSCOPY  03/31/2015    Normal   • HERNIA REPAIR  01/19/2016    Dr Mota   • HYSTERECTOMY     • TONSILLECTOMY           Current Outpatient Medications:   •  albuterol (PROVENTIL) (2.5 MG/3ML) 0.083% nebulizer solution, Take 2.5 mg by nebulization Every 4 (Four) Hours As Needed for Wheezing., Disp: , Rfl:   •  ALPRAZolam (XANAX) 0.5 MG tablet, Take 1 tablet by mouth 2 (Two) Times a Day As Needed for Anxiety., Disp: 60 tablet, Rfl: 2  •  ARIPiprazole (ABILIFY) 15 MG tablet, TAKE ONE TABLET BY MOUTH EVERY DAY, Disp: 30 tablet, Rfl: 3  •  atorvastatin (LIPITOR) 10 MG tablet, Take 10 mg by mouth Daily., Disp: , Rfl:   •  buPROPion XL (WELLBUTRIN XL) 150 MG 24 hr tablet, Take 1 tablet by mouth Every Morning., Disp: 30 tablet, Rfl: 3  •  cetirizine (zyrTEC) 10 MG tablet, Take 10 mg by mouth Daily., Disp: , Rfl:   •  cimetidine (TAGAMET) 400 MG tablet, Take 400 mg by mouth 2 (Two) Times a Day As Needed., Disp: , Rfl:   •  cyclobenzaprine (FLEXERIL) 10 MG tablet, Take 10 mg by mouth 3 (Three) Times a Day As Needed for Muscle Spasms., Disp: , Rfl:   •  diclofenac (VOLTAREN) 75 MG EC tablet, Take 75 mg by mouth 2 (Two) Times a Day., Disp: , Rfl: 0  •  fluticasone-salmeterol (ADVAIR) 250-50 MCG/DOSE DISKUS, Inhale 1 puff 2 (Two) Times a Day., Disp: , Rfl:   •  furosemide (LASIX) 40 MG tablet, Take 40 mg by mouth Daily., Disp: , Rfl:   •  HYDROcodone-acetaminophen (NORCO)  MG per tablet, Take 1 tablet by mouth Every 6 (Six) Hours As Needed for Moderate Pain ., Disp: 120 tablet, Rfl: 0  •  icosapent ethyl (VASCEPA) 1 g capsule capsule, Take 2 g by mouth Daily., Disp: , Rfl:   •  insulin aspart (novoLOG FLEXPEN) 100 UNIT/ML solution pen-injector sc pen, Inject 5 Units under the skin into the appropriate area as directed 3 (Three) Times a Day With  Meals., Disp: , Rfl:   •  insulin degludec (TRESIBA FLEXTOUCH) 100 UNIT/ML solution pen-injector injection, Inject 14 Units under the skin into the appropriate area as directed Daily., Disp: , Rfl:   •  isosorbide mononitrate (IMDUR) 30 MG 24 hr tablet, TAKE ONE BY MOUTH EVERY MORNING, Disp: 30 tablet, Rfl: 5  •  levothyroxine (SYNTHROID, LEVOTHROID) 50 MCG tablet, Take 50 mcg by mouth Daily., Disp: , Rfl:   •  lubiprostone (AMITIZA) 24 MCG capsule, Take 24 mcg by mouth Daily With Breakfast., Disp: , Rfl:   •  metoprolol tartrate (LOPRESSOR) 25 MG tablet, Take 25 mg by mouth 2 (Two) Times a Day., Disp: , Rfl:   •  MULTIPLE VITAMINS ESSENTIAL PO, Take 1 tablet by mouth Daily., Disp: , Rfl:   •  nitroglycerin (NITROSTAT) 0.4 MG SL tablet, Place 0.4 mg under the tongue Every 5 (Five) Minutes As Needed for Chest Pain. Take no more than 3 doses in 15 minutes., Disp: , Rfl:   •  pantoprazole (PROTONIX) 40 MG EC tablet, Take 40 mg by mouth Daily., Disp: , Rfl:   •  prazosin (MINIPRESS) 1 MG capsule, TAKE 1 CAPSULE BY MOUTH EVERY NIGHT AT BEDTIME FOR NIGHTMARES, Disp: 30 capsule, Rfl: 2  •  pregabalin (LYRICA) 100 MG capsule, Take 100 mg by mouth 2 (Two) Times a Day., Disp: , Rfl:   •  SITagliptin-metFORMIN HCl ER (JANUMET XR)  MG tablet, Take 2 tablets by mouth Daily., Disp: , Rfl:   •  traZODone (DESYREL) 100 MG tablet, TAKE 2 TABLETS BY MOUTH EVERY NIGHT AT BEDTIME, Disp: 60 tablet, Rfl: 2  •  venlafaxine XR (EFFEXOR-XR) 150 MG 24 hr capsule, Take 300 mg by mouth Daily., Disp: , Rfl:     Allergies   Allergen Reactions   • Adhesive Tape Rash     Paper tape causes the rash   • Contrast Dye GI Intolerance   • Iodinated Diagnostic Agents Nausea Only   • Latex Rash   • Penicillins Hives   • Statins Rash     BAD LEG CRAMPS     • Sulfa Antibiotics Hives   • Morphine Rash       Family History   Problem Relation Age of Onset   • Cancer Mother         Other Cancer   • Heart disease Mother    • Hypertension Mother    •  Stroke Mother    • Hypertension Father    • Kidney disease Father    • Diabetes Paternal Uncle    • Cancer Paternal Uncle         Colon Cancer       Social History     Tobacco Use   • Smoking status: Current Every Day Smoker     Packs/day: 1.00   • Smokeless tobacco: Never Used   • Tobacco comment: Passive Smoke: N   Substance Use Topics   • Alcohol use: No     Frequency: Never           Current Electrocardiogram:  MONIK Ybarra  01/14/20  3:09 PM

## 2020-01-15 RX ORDER — ALPRAZOLAM 0.5 MG/1
TABLET ORAL
Qty: 60 TABLET | Refills: 0 | Status: SHIPPED | OUTPATIENT
Start: 2020-01-15 | End: 2020-02-15

## 2020-01-16 RX ORDER — PEN NEEDLE, DIABETIC 32GX 5/32"
NEEDLE, DISPOSABLE MISCELLANEOUS
Qty: 100 EACH | Refills: 1 | OUTPATIENT
Start: 2020-01-16

## 2020-01-21 ENCOUNTER — OFFICE VISIT (OUTPATIENT)
Dept: PSYCHIATRY | Facility: CLINIC | Age: 65
End: 2020-01-21

## 2020-01-21 DIAGNOSIS — F51.05 INSOMNIA DUE TO MENTAL DISORDER: Primary | ICD-10-CM

## 2020-01-21 DIAGNOSIS — F34.1 DYSTHYMIA: ICD-10-CM

## 2020-01-21 PROCEDURE — 99213 OFFICE O/P EST LOW 20 MIN: CPT | Performed by: PHYSICIAN ASSISTANT

## 2020-01-21 RX ORDER — MIRTAZAPINE 15 MG/1
15 TABLET, FILM COATED ORAL NIGHTLY
Qty: 30 TABLET | Refills: 3 | Status: SHIPPED | OUTPATIENT
Start: 2020-01-21 | End: 2020-04-14 | Stop reason: SDUPTHER
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
## 2020-01-22 RX ORDER — VENLAFAXINE HYDROCHLORIDE 150 MG/1
300 CAPSULE, EXTENDED RELEASE ORAL DAILY
Qty: 60 CAPSULE | Refills: 3 | Status: SHIPPED | OUTPATIENT
Start: 2020-01-22 | End: 2020-05-18

## 2020-01-28 RX ORDER — ATORVASTATIN CALCIUM 10 MG/1
TABLET, FILM COATED ORAL
Qty: 30 TABLET | Refills: 2 | OUTPATIENT
Start: 2020-01-28

## 2020-02-14 DIAGNOSIS — F51.05 INSOMNIA DUE TO MENTAL DISORDER: ICD-10-CM

## 2020-02-14 DIAGNOSIS — F41.1 GENERALIZED ANXIETY DISORDER: ICD-10-CM

## 2020-02-15 RX ORDER — ALPRAZOLAM 0.5 MG/1
TABLET ORAL
Qty: 60 TABLET | Refills: 2 | Status: SHIPPED | OUTPATIENT
Start: 2020-02-15 | End: 2020-06-15

## 2020-02-26 ENCOUNTER — OFFICE (AMBULATORY)
Dept: URBAN - METROPOLITAN AREA CLINIC 51 | Facility: CLINIC | Age: 65
End: 2020-02-26
Payer: MEDICARE

## 2020-02-26 DIAGNOSIS — R13.10 DYSPHAGIA, UNSPECIFIED: ICD-10-CM

## 2020-02-26 PROCEDURE — 91010 ESOPHAGUS MOTILITY STUDY: CPT | Performed by: INTERNAL MEDICINE

## 2020-02-26 PROCEDURE — 91037 ESOPH IMPED FUNCTION TEST: CPT | Mod: 59 | Performed by: INTERNAL MEDICINE

## 2020-02-28 DIAGNOSIS — M54.40 CHRONIC MIDLINE LOW BACK PAIN WITH SCIATICA, SCIATICA LATERALITY UNSPECIFIED: ICD-10-CM

## 2020-02-28 DIAGNOSIS — G89.29 CHRONIC MIDLINE LOW BACK PAIN WITH SCIATICA, SCIATICA LATERALITY UNSPECIFIED: ICD-10-CM

## 2020-02-28 RX ORDER — HYDROCODONE BITARTRATE AND ACETAMINOPHEN 10; 325 MG/1; MG/1
TABLET ORAL
Qty: 120 TABLET | Refills: 0 | OUTPATIENT
Start: 2020-02-28

## 2020-03-02 ENCOUNTER — TELEPHONE (OUTPATIENT)
Dept: PAIN MEDICINE | Facility: CLINIC | Age: 65
End: 2020-03-02

## 2020-03-02 NOTE — TELEPHONE ENCOUNTER
Patient needs to reschedule 3/27  appt but will have to be made after you leave can you write her a script to get her through until 4/13 when I can get her in with Dr. Haas.

## 2020-03-05 DIAGNOSIS — M54.40 CHRONIC MIDLINE LOW BACK PAIN WITH SCIATICA, SCIATICA LATERALITY UNSPECIFIED: ICD-10-CM

## 2020-03-05 DIAGNOSIS — G89.29 CHRONIC MIDLINE LOW BACK PAIN WITH SCIATICA, SCIATICA LATERALITY UNSPECIFIED: ICD-10-CM

## 2020-03-05 RX ORDER — HYDROCODONE BITARTRATE AND ACETAMINOPHEN 10; 325 MG/1; MG/1
1 TABLET ORAL EVERY 6 HOURS PRN
Qty: 35 TABLET | Refills: 0 | Status: SHIPPED | OUTPATIENT
Start: 2020-03-05 | End: 2020-03-05 | Stop reason: SDUPTHER

## 2020-03-05 RX ORDER — HYDROCODONE BITARTRATE AND ACETAMINOPHEN 10; 325 MG/1; MG/1
1 TABLET ORAL EVERY 6 HOURS PRN
Qty: 120 TABLET | Refills: 0 | Status: SHIPPED | OUTPATIENT
Start: 2020-03-05 | End: 2020-04-09

## 2020-03-23 DIAGNOSIS — F34.1 DYSTHYMIA: ICD-10-CM

## 2020-03-23 RX ORDER — ARIPIPRAZOLE 15 MG/1
TABLET ORAL
Qty: 30 TABLET | Refills: 2 | Status: SHIPPED | OUTPATIENT
Start: 2020-03-23 | End: 2020-06-15

## 2020-03-23 RX ORDER — BUPROPION HYDROCHLORIDE 150 MG/1
TABLET ORAL
Qty: 30 TABLET | Refills: 2 | Status: SHIPPED | OUTPATIENT
Start: 2020-03-23 | End: 2020-05-06 | Stop reason: DRUGHIGH

## 2020-03-23 RX ORDER — TRAZODONE HYDROCHLORIDE 100 MG/1
TABLET ORAL
Qty: 60 TABLET | Refills: 2 | Status: SHIPPED | OUTPATIENT
Start: 2020-03-23 | End: 2020-04-14

## 2020-03-23 RX ORDER — PRAZOSIN HYDROCHLORIDE 1 MG/1
CAPSULE ORAL
Qty: 30 CAPSULE | Refills: 1 | OUTPATIENT
Start: 2020-03-23

## 2020-03-23 NOTE — TELEPHONE ENCOUNTER
PATIENT STATES SHE IS UNABLE TO SLEEP AND WOULD LIKE TO TRY SOMETHING DIFFERENT IF POSSIBLE PLEASE SEND TO [facility] IN [place]

## 2020-04-09 ENCOUNTER — OFFICE VISIT (OUTPATIENT)
Dept: PAIN MEDICINE | Facility: CLINIC | Age: 65
End: 2020-04-09

## 2020-04-09 VITALS
TEMPERATURE: 98.6 F | HEIGHT: 64 IN | BODY MASS INDEX: 31.41 KG/M2 | OXYGEN SATURATION: 91 % | RESPIRATION RATE: 16 BRPM | DIASTOLIC BLOOD PRESSURE: 68 MMHG | SYSTOLIC BLOOD PRESSURE: 110 MMHG | HEART RATE: 108 BPM

## 2020-04-09 DIAGNOSIS — M54.16 LUMBAR RADICULOPATHY: ICD-10-CM

## 2020-04-09 DIAGNOSIS — M79.7 FIBROMYOSITIS: ICD-10-CM

## 2020-04-09 DIAGNOSIS — K59.03 CONSTIPATION DUE TO PAIN MEDICATION: Primary | ICD-10-CM

## 2020-04-09 DIAGNOSIS — M54.40 CHRONIC MIDLINE LOW BACK PAIN WITH SCIATICA, SCIATICA LATERALITY UNSPECIFIED: ICD-10-CM

## 2020-04-09 DIAGNOSIS — M79.604 LEG PAIN, BILATERAL: ICD-10-CM

## 2020-04-09 DIAGNOSIS — M54.40 LUMBAGO OF LUMBAR REGION WITH SCIATICA: ICD-10-CM

## 2020-04-09 DIAGNOSIS — G89.29 CHRONIC MIDLINE LOW BACK PAIN WITH SCIATICA, SCIATICA LATERALITY UNSPECIFIED: ICD-10-CM

## 2020-04-09 DIAGNOSIS — M48.061 SPINAL STENOSIS OF LUMBAR REGION, UNSPECIFIED WHETHER NEUROGENIC CLAUDICATION PRESENT: ICD-10-CM

## 2020-04-09 DIAGNOSIS — M79.605 LEG PAIN, BILATERAL: ICD-10-CM

## 2020-04-09 DIAGNOSIS — M47.816 LUMBAR SPONDYLOSIS: ICD-10-CM

## 2020-04-09 DIAGNOSIS — M79.18 MYOFASCIAL MUSCLE PAIN: ICD-10-CM

## 2020-04-09 DIAGNOSIS — M54.2 NECK PAIN: ICD-10-CM

## 2020-04-09 DIAGNOSIS — M54.12 CERVICAL RADICULOPATHY: ICD-10-CM

## 2020-04-09 PROCEDURE — G0463 HOSPITAL OUTPT CLINIC VISIT: HCPCS | Performed by: PHYSICAL MEDICINE & REHABILITATION

## 2020-04-09 PROCEDURE — 99214 OFFICE O/P EST MOD 30 MIN: CPT | Performed by: PHYSICAL MEDICINE & REHABILITATION

## 2020-04-09 RX ORDER — MORPHINE SULFATE 15 MG/1
15 TABLET, FILM COATED, EXTENDED RELEASE ORAL EVERY 8 HOURS
Qty: 90 TABLET | Refills: 0 | Status: SHIPPED | OUTPATIENT
Start: 2020-04-09 | End: 2020-06-23

## 2020-04-09 RX ORDER — MORPHINE SULFATE 15 MG/1
15 TABLET, FILM COATED, EXTENDED RELEASE ORAL EVERY 8 HOURS
Qty: 90 TABLET | Refills: 0 | Status: SHIPPED | OUTPATIENT
Start: 2020-04-09 | End: 2020-04-09 | Stop reason: SDUPTHER

## 2020-04-09 NOTE — PROGRESS NOTES
Subjective   Marlena Avilez is a 65 y.o. female.     Low back and yen. legs pain,   Lumbar Back Pain with BLLE Pain,  RLE Numbness and RLE Weakness,  Lumbar Back Pain is increased with prolonged sitting.  Patient planned Lumbar Epidural #1 with Dr. Pleitez, reports very little relief with LESIs. Pain is 9/10 at best, aching, throbbing, worse with bending and standing, interferes with ADLs, activity, failed injections, meds. MRI L-spine with laminectomy and fusion. Taking Zohydro 15mg BID with Dr. Pleitez with poor relief, Norco 10mg TID with PCP with poor relief, had good relief in past with Percocet 10mg TID prn, restarted, but less relief than Hydrocodone, worsening pain. Current patient of this clinic. Rotated to Norco 10mg QID prn, working well initially but pain worsening. C/o constipation controlled by fiber supplement.       The following portions of the patient's history were reviewed and updated as appropriate: allergies, current medications, past family history, past medical history, past social history, past surgical history and problem list.    Review of Systems   Constitutional: Negative for chills, fatigue and fever.   HENT: Positive for hearing loss. Negative for trouble swallowing.    Eyes: Positive for visual disturbance.   Respiratory: Positive for shortness of breath.    Cardiovascular: Negative for chest pain.   Gastrointestinal: Negative for abdominal pain, constipation, diarrhea, nausea and vomiting.   Genitourinary: Negative for urinary incontinence.   Musculoskeletal: Negative for arthralgias, back pain, joint swelling, myalgias and neck pain.   Neurological: Positive for headache. Negative for dizziness, weakness and numbness.       Objective   Physical Exam   Constitutional: She is oriented to person, place, and time. She appears well-developed and well-nourished.   HENT:   Head: Normocephalic and atraumatic.   Eyes: Pupils are equal, round, and reactive to light. EOM are normal.   Neck: Normal  range of motion.   Cardiovascular: Normal rate, regular rhythm, normal heart sounds and intact distal pulses.   Pulmonary/Chest: Breath sounds normal.   Abdominal: Soft. Bowel sounds are normal. She exhibits no distension. There is no tenderness.   Musculoskeletal:   Strength 4/5 globally   Neurological: She is alert and oriented to person, place, and time. She has normal strength and normal reflexes. She displays normal reflexes. No sensory deficit.   Psychiatric: She has a normal mood and affect. Her behavior is normal. Thought content normal.         Assessment/Plan   Diagnoses and all orders for this visit:    Constipation due to pain medication    Cervical radiculopathy    Lumbago of lumbar region with sciatica    Chronic midline low back pain with sciatica, sciatica laterality unspecified    Leg pain, bilateral    Lumbar radiculopathy    Myofascial muscle pain    Neck pain    Fibromyositis    Lumbar spondylosis    Spinal stenosis of lumbar region, unspecified whether neurogenic claudication present        Inspect reviewed, in order. UDS 12/30/19 in order.  Stopped Zohydro, Norco. Failed Percocet 10mg TID prn which had worked well in past   Began Norco 10mg QID prn, was working well, tolerant. Begin MS-Contin 15mg TID, denies allergy to morphine. Cont Zanaflex 2mg qHS prn.  Cont fiber supplement, will start [facility]IC med in future if necessary.  RTC 2 months for f/u.

## 2020-04-13 ENCOUNTER — TELEPHONE (OUTPATIENT)
Dept: PSYCHIATRY | Facility: CLINIC | Age: 65
End: 2020-04-13

## 2020-04-13 ENCOUNTER — APPOINTMENT (OUTPATIENT)
Dept: PAIN MEDICINE | Facility: CLINIC | Age: 65
End: 2020-04-13

## 2020-04-14 DIAGNOSIS — F51.05 INSOMNIA DUE TO MENTAL DISORDER: ICD-10-CM

## 2020-04-14 RX ORDER — MIRTAZAPINE 30 MG/1
30 TABLET, FILM COATED ORAL NIGHTLY
Qty: 30 TABLET | Refills: 3 | Status: SHIPPED | OUTPATIENT
Start: 2020-04-14 | End: 2020-05-05 | Stop reason: ALTCHOICE

## 2020-04-14 NOTE — TELEPHONE ENCOUNTER
4/14/20-- pt called needs refill on Remeron 15mg now taking 2 at night----Donna HOUSTON--/[id number] directions now need to state 2 at night, Marlena said that helped last night

## 2020-04-17 ENCOUNTER — TELEHEALTH PROVIDED OTHER THAN IN PATIENT'S HOME (AMBULATORY)
Dept: URBAN - METROPOLITAN AREA TELEHEALTH 4 | Facility: TELEHEALTH | Age: 65
End: 2020-04-17
Payer: MEDICARE

## 2020-04-17 VITALS — HEIGHT: 63 IN

## 2020-04-17 DIAGNOSIS — K62.89 OTHER SPECIFIED DISEASES OF ANUS AND RECTUM: ICD-10-CM

## 2020-04-17 DIAGNOSIS — K59.00 CONSTIPATION, UNSPECIFIED: ICD-10-CM

## 2020-04-17 PROCEDURE — 99442 VIRTUAL CHECK-IN 11-20 MINUTES; COMMERCIAL INS: CPT | Performed by: INTERNAL MEDICINE

## 2020-04-17 RX ORDER — NITROGLYCERIN 4 MG/G
0.8 OINTMENT RECTAL
Qty: 1 | Refills: 1 | Status: COMPLETED
Start: 2020-04-17 | End: 2021-10-27

## 2020-04-17 RX ORDER — PLECANATIDE 3 MG/1
3 TABLET ORAL
Qty: 90 | Refills: 3 | Status: COMPLETED
Start: 2020-04-17 | End: 2021-02-05

## 2020-04-21 RX ORDER — ISOSORBIDE MONONITRATE 30 MG/1
TABLET, EXTENDED RELEASE ORAL
Qty: 30 TABLET | Refills: 4 | Status: SHIPPED | OUTPATIENT
Start: 2020-04-21 | End: 2020-09-09

## 2020-05-01 ENCOUNTER — TELEPHONE (OUTPATIENT)
Dept: PSYCHIATRY | Facility: CLINIC | Age: 65
End: 2020-05-01

## 2020-05-01 NOTE — TELEPHONE ENCOUNTER
Patient contacted due to reaching out to provider stating she was very depressed. I called the patient she stated she is still depressed and  still unable to sleep. On 4/13/2020 She was told to increase  remeron 15mg  2 tab q hs and per patient  that's' not working. Please advise

## 2020-05-04 ENCOUNTER — TELEPHONE (OUTPATIENT)
Dept: PAIN MEDICINE | Facility: CLINIC | Age: 65
End: 2020-05-04

## 2020-05-04 RX ORDER — HYDROCODONE BITARTRATE AND ACETAMINOPHEN 10; 325 MG/1; MG/1
1 TABLET ORAL EVERY 6 HOURS PRN
Qty: 120 TABLET | Refills: 0 | Status: SHIPPED | OUTPATIENT
Start: 2020-05-04 | End: 2020-05-27 | Stop reason: SDUPTHER

## 2020-05-04 NOTE — TELEPHONE ENCOUNTER
Yes, but please have her take her remaining morphine to the pharmacy for count and disposal, they can call us with the count. Thanks.

## 2020-05-04 NOTE — TELEPHONE ENCOUNTER
Pt called and said morphine is not working.  Wants to know if she can go back on the norco?? Inspect scanned in

## 2020-05-04 NOTE — TELEPHONE ENCOUNTER
Per patient her depression and  insomnia isn't any better she would like a telephone visit. I scheduled her for tomorrow 5/5/2020 at 2:15 pm

## 2020-05-05 ENCOUNTER — OFFICE VISIT (OUTPATIENT)
Dept: PSYCHIATRY | Facility: CLINIC | Age: 65
End: 2020-05-05

## 2020-05-05 DIAGNOSIS — F51.05 INSOMNIA DUE TO MENTAL DISORDER: Primary | ICD-10-CM

## 2020-05-05 DIAGNOSIS — F41.1 GENERALIZED ANXIETY DISORDER: ICD-10-CM

## 2020-05-05 DIAGNOSIS — F34.1 DYSTHYMIA: ICD-10-CM

## 2020-05-05 PROCEDURE — 99213 OFFICE O/P EST LOW 20 MIN: CPT | Performed by: PHYSICIAN ASSISTANT

## 2020-05-05 RX ORDER — AMITRIPTYLINE HYDROCHLORIDE 50 MG/1
50 TABLET, FILM COATED ORAL NIGHTLY PRN
Qty: 30 TABLET | Refills: 2 | Status: SHIPPED | OUTPATIENT
Start: 2020-05-05 | End: 2020-06-18 | Stop reason: ALTCHOICE
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
## 2020-05-06 RX ORDER — BUPROPION HYDROCHLORIDE 300 MG/1
300 TABLET ORAL EVERY MORNING
Qty: 30 TABLET | Refills: 2 | Status: SHIPPED | OUTPATIENT
Start: 2020-05-06 | End: 2020-08-12

## 2020-05-11 ENCOUNTER — OFFICE (AMBULATORY)
Dept: URBAN - METROPOLITAN AREA CLINIC 64 | Facility: CLINIC | Age: 65
End: 2020-05-11
Payer: COMMERCIAL

## 2020-05-11 VITALS — HEIGHT: 63 IN

## 2020-05-11 DIAGNOSIS — R10.31 RIGHT LOWER QUADRANT PAIN: ICD-10-CM

## 2020-05-11 DIAGNOSIS — K62.89 OTHER SPECIFIED DISEASES OF ANUS AND RECTUM: ICD-10-CM

## 2020-05-11 DIAGNOSIS — R19.7 DIARRHEA, UNSPECIFIED: ICD-10-CM

## 2020-05-11 PROCEDURE — 99442 VIRTUAL CHECK-IN 11-20 MINUTES; COMMERCIAL INS: CPT | Performed by: INTERNAL MEDICINE

## 2020-05-18 DIAGNOSIS — F34.1 DYSTHYMIA: ICD-10-CM

## 2020-05-18 RX ORDER — VENLAFAXINE HYDROCHLORIDE 150 MG/1
300 CAPSULE, EXTENDED RELEASE ORAL DAILY
Qty: 60 CAPSULE | Refills: 2 | Status: SHIPPED | OUTPATIENT
Start: 2020-05-18 | End: 2020-08-12

## 2020-05-21 ENCOUNTER — ON CAMPUS - OUTPATIENT (AMBULATORY)
Dept: URBAN - METROPOLITAN AREA HOSPITAL 2 | Facility: HOSPITAL | Age: 65
End: 2020-05-21

## 2020-05-21 VITALS
DIASTOLIC BLOOD PRESSURE: 90 MMHG | SYSTOLIC BLOOD PRESSURE: 151 MMHG | WEIGHT: 181 LBS | DIASTOLIC BLOOD PRESSURE: 81 MMHG | SYSTOLIC BLOOD PRESSURE: 131 MMHG | DIASTOLIC BLOOD PRESSURE: 92 MMHG | HEART RATE: 89 BPM | OXYGEN SATURATION: 97 % | HEART RATE: 96 BPM | OXYGEN SATURATION: 100 % | HEART RATE: 90 BPM | HEIGHT: 63 IN | HEART RATE: 80 BPM | DIASTOLIC BLOOD PRESSURE: 84 MMHG | RESPIRATION RATE: 20 BRPM | DIASTOLIC BLOOD PRESSURE: 77 MMHG | TEMPERATURE: 97.6 F | RESPIRATION RATE: 16 BRPM | SYSTOLIC BLOOD PRESSURE: 138 MMHG | SYSTOLIC BLOOD PRESSURE: 166 MMHG | HEART RATE: 102 BPM | SYSTOLIC BLOOD PRESSURE: 123 MMHG | SYSTOLIC BLOOD PRESSURE: 155 MMHG | RESPIRATION RATE: 18 BRPM

## 2020-05-21 DIAGNOSIS — R13.10 DYSPHAGIA, UNSPECIFIED: ICD-10-CM

## 2020-05-21 PROCEDURE — 43450 DILATE ESOPHAGUS 1/MULT PASS: CPT | Performed by: INTERNAL MEDICINE

## 2020-05-21 PROCEDURE — 43235 EGD DIAGNOSTIC BRUSH WASH: CPT | Performed by: INTERNAL MEDICINE

## 2020-05-26 ENCOUNTER — TRANSCRIBE ORDERS (OUTPATIENT)
Dept: ADMINISTRATIVE | Facility: HOSPITAL | Age: 65
End: 2020-05-26

## 2020-05-26 DIAGNOSIS — R06.00 DYSPNEA, UNSPECIFIED TYPE: Primary | ICD-10-CM

## 2020-05-28 ENCOUNTER — OFFICE (AMBULATORY)
Dept: URBAN - METROPOLITAN AREA CLINIC 64 | Facility: CLINIC | Age: 65
End: 2020-05-28

## 2020-05-28 ENCOUNTER — TELEPHONE (OUTPATIENT)
Dept: PAIN MEDICINE | Facility: HOSPITAL | Age: 65
End: 2020-05-28

## 2020-05-28 VITALS
SYSTOLIC BLOOD PRESSURE: 121 MMHG | HEART RATE: 100 BPM | WEIGHT: 188 LBS | HEIGHT: 63 IN | DIASTOLIC BLOOD PRESSURE: 77 MMHG

## 2020-05-28 DIAGNOSIS — K59.00 CONSTIPATION, UNSPECIFIED: ICD-10-CM

## 2020-05-28 DIAGNOSIS — R13.10 DYSPHAGIA, UNSPECIFIED: ICD-10-CM

## 2020-05-28 DIAGNOSIS — R10.31 RIGHT LOWER QUADRANT PAIN: ICD-10-CM

## 2020-05-28 PROCEDURE — 99214 OFFICE O/P EST MOD 30 MIN: CPT | Performed by: INTERNAL MEDICINE

## 2020-05-28 RX ORDER — PEPPERMINT OIL 90 MG
180 CAPSULE, DELAYED, AND EXTENDED RELEASE ORAL
Qty: 48 | Refills: 1 | Status: COMPLETED
Start: 2020-05-28 | End: 2021-02-05

## 2020-05-28 RX ORDER — HYDROCODONE BITARTRATE AND ACETAMINOPHEN 10; 325 MG/1; MG/1
1 TABLET ORAL EVERY 6 HOURS PRN
Qty: 120 TABLET | Refills: 0 | Status: SHIPPED | OUTPATIENT
Start: 2020-05-28 | End: 2020-06-23

## 2020-05-28 RX ORDER — PRUCALOPRIDE 2 MG/1
2 TABLET, FILM COATED ORAL
Qty: 30 | Refills: 11 | Status: COMPLETED
Start: 2020-05-28 | End: 2020-06-25

## 2020-05-28 RX ORDER — LUBIPROSTONE 24 UG/1
24 CAPSULE ORAL 2 TIMES DAILY WITH MEALS
Qty: 60 CAPSULE | Refills: 11 | Status: SHIPPED | OUTPATIENT
Start: 2020-05-28 | End: 2020-08-28 | Stop reason: SDUPTHER

## 2020-06-15 DIAGNOSIS — F34.1 DYSTHYMIA: ICD-10-CM

## 2020-06-15 DIAGNOSIS — F41.1 GENERALIZED ANXIETY DISORDER: ICD-10-CM

## 2020-06-15 DIAGNOSIS — F51.05 INSOMNIA DUE TO MENTAL DISORDER: ICD-10-CM

## 2020-06-15 RX ORDER — ARIPIPRAZOLE 15 MG/1
TABLET ORAL
Qty: 30 TABLET | Refills: 1 | Status: SHIPPED | OUTPATIENT
Start: 2020-06-15 | End: 2020-08-12

## 2020-06-15 RX ORDER — ALPRAZOLAM 0.5 MG/1
TABLET ORAL
Qty: 60 TABLET | Refills: 0 | Status: SHIPPED | OUTPATIENT
Start: 2020-06-15 | End: 2020-07-22

## 2020-06-15 RX ORDER — BUPROPION HYDROCHLORIDE 150 MG/1
TABLET ORAL
Qty: 30 TABLET | Refills: 1 | OUTPATIENT
Start: 2020-06-15

## 2020-06-18 ENCOUNTER — OFFICE VISIT (OUTPATIENT)
Dept: PSYCHIATRY | Facility: CLINIC | Age: 65
End: 2020-06-18

## 2020-06-18 DIAGNOSIS — F34.1 DYSTHYMIA: ICD-10-CM

## 2020-06-18 DIAGNOSIS — F51.05 INSOMNIA DUE TO MENTAL DISORDER: Primary | ICD-10-CM

## 2020-06-18 DIAGNOSIS — F41.1 GENERALIZED ANXIETY DISORDER: ICD-10-CM

## 2020-06-18 PROCEDURE — 99213 OFFICE O/P EST LOW 20 MIN: CPT | Performed by: PHYSICIAN ASSISTANT

## 2020-06-18 RX ORDER — QUETIAPINE FUMARATE 100 MG/1
100 TABLET, FILM COATED ORAL NIGHTLY
Qty: 30 TABLET | Refills: 2 | Status: SHIPPED | OUTPATIENT
Start: 2020-06-18 | End: 2020-09-09
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
## 2020-06-23 ENCOUNTER — OFFICE VISIT (OUTPATIENT)
Dept: PAIN MEDICINE | Facility: CLINIC | Age: 65
End: 2020-06-23

## 2020-06-23 VITALS
TEMPERATURE: 98 F | BODY MASS INDEX: 31.58 KG/M2 | SYSTOLIC BLOOD PRESSURE: 158 MMHG | DIASTOLIC BLOOD PRESSURE: 86 MMHG | HEIGHT: 64 IN | HEART RATE: 98 BPM | WEIGHT: 185 LBS | RESPIRATION RATE: 16 BRPM | OXYGEN SATURATION: 96 %

## 2020-06-23 DIAGNOSIS — M25.562 ARTHRALGIA OF LEFT KNEE: ICD-10-CM

## 2020-06-23 DIAGNOSIS — M54.2 NECK PAIN: ICD-10-CM

## 2020-06-23 DIAGNOSIS — M54.40 CHRONIC MIDLINE LOW BACK PAIN WITH SCIATICA, SCIATICA LATERALITY UNSPECIFIED: Primary | ICD-10-CM

## 2020-06-23 DIAGNOSIS — M48.061 SPINAL STENOSIS OF LUMBAR REGION, UNSPECIFIED WHETHER NEUROGENIC CLAUDICATION PRESENT: ICD-10-CM

## 2020-06-23 DIAGNOSIS — M54.12 CERVICAL RADICULOPATHY: ICD-10-CM

## 2020-06-23 DIAGNOSIS — M47.816 LUMBAR SPONDYLOSIS: ICD-10-CM

## 2020-06-23 DIAGNOSIS — M54.16 LUMBAR RADICULOPATHY: ICD-10-CM

## 2020-06-23 DIAGNOSIS — M79.7 FIBROMYOSITIS: ICD-10-CM

## 2020-06-23 DIAGNOSIS — M46.96 LUMBAR SPONDYLITIS (HCC): ICD-10-CM

## 2020-06-23 DIAGNOSIS — G89.29 CHRONIC MIDLINE LOW BACK PAIN WITH SCIATICA, SCIATICA LATERALITY UNSPECIFIED: Primary | ICD-10-CM

## 2020-06-23 DIAGNOSIS — M79.18 MYOFASCIAL MUSCLE PAIN: ICD-10-CM

## 2020-06-23 PROCEDURE — 99214 OFFICE O/P EST MOD 30 MIN: CPT | Performed by: PHYSICAL MEDICINE & REHABILITATION

## 2020-06-23 PROCEDURE — G0463 HOSPITAL OUTPT CLINIC VISIT: HCPCS | Performed by: PHYSICAL MEDICINE & REHABILITATION

## 2020-06-23 RX ORDER — OXYCODONE AND ACETAMINOPHEN 10; 325 MG/1; MG/1
1 TABLET ORAL EVERY 6 HOURS PRN
Qty: 120 TABLET | Refills: 0 | Status: SHIPPED | OUTPATIENT
Start: 2020-06-23 | End: 2020-06-23 | Stop reason: SDUPTHER

## 2020-06-23 RX ORDER — OXYCODONE AND ACETAMINOPHEN 10; 325 MG/1; MG/1
1 TABLET ORAL EVERY 6 HOURS PRN
Qty: 120 TABLET | Refills: 0 | Status: SHIPPED | OUTPATIENT
Start: 2020-06-23 | End: 2020-08-28 | Stop reason: SDUPTHER

## 2020-06-23 NOTE — PROGRESS NOTES
Subjective   Marlena Avilez is a 65 y.o. female.     Low back and yen. legs pain,   Lumbar Back Pain with BLLE Pain,  RLE Numbness and RLE Weakness,  Lumbar Back Pain is increased with prolonged sitting.  Patient planned Lumbar Epidural #1 with Dr. Pleitez, reports very little relief with LESIs. Pain is 9/10 at best, aching, throbbing, worse with bending and standing, interferes with ADLs, activity, failed injections, meds. MRI L-spine with laminectomy and fusion. Taking Zohydro 15mg BID with Dr. Pleitez with poor relief, Norco 10mg TID with PCP with poor relief, had good relief in past with Percocet 10mg TID prn, restarted, but less relief than Hydrocodone, worsening pain. Current patient of this clinic. Rotated to Norco 10mg QID prn, working well initially but pain worsening, rotated to MS-Contin 15mg TID. C/o constipation controlled by fiber supplement.       The following portions of the patient's history were reviewed and updated as appropriate: allergies, current medications, past family history, past medical history, past social history, past surgical history and problem list.    Review of Systems   Constitutional: Negative for chills, fatigue and fever.   HENT: Positive for hearing loss. Negative for trouble swallowing.    Eyes: Positive for visual disturbance.   Respiratory: Positive for shortness of breath.    Cardiovascular: Negative for chest pain.   Gastrointestinal: Negative for abdominal pain, constipation, diarrhea, nausea and vomiting.   Genitourinary: Negative for urinary incontinence.   Musculoskeletal: Negative for arthralgias, back pain, joint swelling, myalgias and neck pain.   Neurological: Positive for headache. Negative for dizziness, weakness and numbness.       Objective   Physical Exam   Constitutional: She is oriented to person, place, and time. She appears well-developed and well-nourished.   HENT:   Head: Normocephalic and atraumatic.   Eyes: Pupils are equal, round, and reactive to light.  EOM are normal.   Neck: Normal range of motion.   Cardiovascular: Normal rate, regular rhythm, normal heart sounds and intact distal pulses.   Pulmonary/Chest: Breath sounds normal.   Abdominal: Soft. Bowel sounds are normal. She exhibits no distension. There is no tenderness.   Musculoskeletal:   Strength 4/5 globally   Neurological: She is alert and oriented to person, place, and time. She has normal strength and normal reflexes. She displays normal reflexes. No sensory deficit.   Psychiatric: She has a normal mood and affect. Her behavior is normal. Thought content normal.         Assessment/Plan   Marlena was seen today for back pain.    Diagnoses and all orders for this visit:    Chronic midline low back pain with sciatica, sciatica laterality unspecified    Lumbar radiculopathy    Myofascial muscle pain    Neck pain    Arthralgia of left knee    Fibromyositis    Lumbar spondylitis (CMS/HCC)    Lumbar spondylosis    Spinal stenosis of lumbar region, unspecified whether neurogenic claudication present    Cervical radiculopathy        Inspect reviewed, in order. UDS 12/30/19 in order.  Stopped Zohydro, Norco. Failed Percocet 10mg TID prn which had worked well in past   Began Norco 10mg QID prn, was working well, tolerant. Completely failed MS-Contin 15mg TID, denies allergy to morphine.   Restart Percocet 10mg QID prn.  Cont Zanaflex 2mg qHS prn.  Cont fiber supplement, will start [facility] med in future if necessary.  RTC 2 months for f/u.

## 2020-06-25 ENCOUNTER — OFFICE (AMBULATORY)
Dept: URBAN - METROPOLITAN AREA CLINIC 64 | Facility: CLINIC | Age: 65
End: 2020-06-25

## 2020-06-25 VITALS
WEIGHT: 191 LBS | SYSTOLIC BLOOD PRESSURE: 160 MMHG | DIASTOLIC BLOOD PRESSURE: 87 MMHG | HEIGHT: 63 IN | HEART RATE: 109 BPM

## 2020-06-25 DIAGNOSIS — K59.00 CONSTIPATION, UNSPECIFIED: ICD-10-CM

## 2020-06-25 DIAGNOSIS — R10.31 RIGHT LOWER QUADRANT PAIN: ICD-10-CM

## 2020-06-25 DIAGNOSIS — K21.9 GASTRO-ESOPHAGEAL REFLUX DISEASE WITHOUT ESOPHAGITIS: ICD-10-CM

## 2020-06-25 DIAGNOSIS — R14.0 ABDOMINAL DISTENSION (GASEOUS): ICD-10-CM

## 2020-06-25 PROCEDURE — 99213 OFFICE O/P EST LOW 20 MIN: CPT | Performed by: NURSE PRACTITIONER

## 2020-06-25 RX ORDER — PRUCALOPRIDE 2 MG/1
2 TABLET, FILM COATED ORAL
Qty: 30 | Refills: 11 | Status: COMPLETED
Start: 2020-05-28 | End: 2020-06-25

## 2020-06-25 RX ORDER — METHYLNALTREXONE BROMIDE 150 MG/1
450 TABLET ORAL
Qty: 90 | Refills: 11 | Status: COMPLETED
Start: 2020-06-25 | End: 2020-10-08

## 2020-06-25 RX ORDER — CIPROFLOXACIN 250 MG/1
500 TABLET, FILM COATED ORAL
Qty: 20 | Refills: 0 | Status: COMPLETED
Start: 2020-06-25 | End: 2021-10-27

## 2020-06-26 ENCOUNTER — TELEPHONE (OUTPATIENT)
Dept: PSYCHIATRY | Facility: CLINIC | Age: 65
End: 2020-06-26

## 2020-06-26 NOTE — TELEPHONE ENCOUNTER
PATIENT CALLED TO ADVISE THE SEROQUEL IS NOT WORKING WITH HER SLEEP. SHE HAS NOT BEEN TO SLEEP ALL NIGHT.

## 2020-06-30 NOTE — TELEPHONE ENCOUNTER
She is taking 100mg tablets of Seroquel currently.  Telll her to try taking two tablets to see if that works better, and if not can increase up to 3 tablets (300mg).

## 2020-07-01 ENCOUNTER — TRANSCRIBE ORDERS (OUTPATIENT)
Dept: SLEEP MEDICINE | Facility: HOSPITAL | Age: 65
End: 2020-07-01

## 2020-07-01 DIAGNOSIS — G47.33 OBSTRUCTIVE SLEEP APNEA (ADULT) (PEDIATRIC): Primary | ICD-10-CM

## 2020-07-13 RX ORDER — AMITRIPTYLINE HYDROCHLORIDE 50 MG/1
TABLET, FILM COATED ORAL
Qty: 30 TABLET | Refills: 2 | Status: SHIPPED | OUTPATIENT
Start: 2020-07-13 | End: 2020-09-28

## 2020-07-15 NOTE — PROGRESS NOTES
[facility]      REASON FOR FOLLOW-UP:  Coronary artery disease  Diabetes mellitus type 2  Dyslipidemia  Hypertension with hypertensive cardiovascular disease         Chief Complaint   Patient presents with   • Atrial Fibrillation     6 month f/u   • Hyperlipidemia   • Hypertension         Dear April,        History of Present Illness     It was my pleasure to see Marlena in the office today.  As you are aware, she is a very pleasant 64-year-old  female with a history of mild to moderate coronary occlusive disease as well as chronic obstructive pulmonary disease, nocturnal   hypoxemia, diabetes mellitus, anxiety, depression, dyslipidemia and hypertension with hypertensive cardiovascular disease.  She presents today for follow-up on the above conditions.     Today, Marlena reports that she feels well.    Patient continues to have rare episodes of mild chest discomfort relieved with 1 nitroglycerin that occur during times of the emotional duress.  She has occasional, rare episodes of palpitations that are not particularly bothersome to her.  She denies any PND  Or orthopnea.  She denies any shortness of breath out of character.   She denies lower extremity edema, dizziness or lightheadedness.      Recent labs 4/30/2020: Hemoglobin A1c 7.1, creatinine normal.  Last lipid panel: 1/10/2020 with triglycerides 171, .  Patient's only complaint today is that she is gaining weight.    Assessment:  1. Chest pain. Cardiac catheterization April 2017 demonstrated only mild-to-moderate occlusive disease  2. Chronic obstructive pulmonary disease  3. Nocturnal hypoxemia  4. Diabetes mellitus type 2  5. History of dyslipidemia  6. History of hypertension  7. Palpitations.  8. Tobacco abuse disorder.    Plan:  Patient is intolerant to higher dose of statins due to myalgias.  Recommend diet and exercise.  Handouts given for heart healthy diet to reduce LDL/triglycerides.  Verbal  discussion with patient as well.  Lipids per your office  Will refill nitroglycerin  Recommend complete smoking cessation-patient counseled again today  Follow-up in 6 months or sooner if needed          The following portions of the patient's history were reviewed and updated as appropriate: allergies, current medications, past family history, past medical history, past social history, past surgical history and problem list.    REVIEW OF SYSTEMS:    Review of Systems   Cardiovascular: Positive for chest pain.   All other systems reviewed and are negative.      Vitals:    07/16/20 1414   BP: 113/68   Pulse: 99   SpO2: 93%         PHYSICAL EXAM:    General: Alert, cooperative, no distress, appears stated age  Head:  Normocephalic, atraumatic, mucous membranes moist  Eyes:  Conjunctiva/corneas clear, EOM's intact     Neck:  Supple,  no JVD or bruit     Lungs: Clear to auscultation bilaterally, no wheezes rhonchi rales are noted  Chest wall: No tenderness  Musculoskeletal:   Ambulates freely without assistance  Heart::  Regular rate and rhythm, S1 and S2 normal, no murmur, rub or gallop  Abdomen: Soft, non-tender, nondistended, bowel sounds active, no abdominal bruit  Extremities: No cyanosis, clubbing, or edema   Pulses: 2+ and symmetric all extremities  Skin:  No rashes or lesions  Neuro/psych: A&O x3. CN II through XII are grossly intact with flat affect        Past Medical History:   Diagnosis Date   • Anemia    • Anxiety disorder    • Arthritis    • Atrial fibrillation (CMS/HCC)     Proxysmal   • Chronic kidney disease    • COPD (chronic obstructive pulmonary disease) (CMS/HCC)    • Dark stools    • Depression    • Disease of thyroid gland    • GERD (gastroesophageal reflux disease)    • History of hernia repair    • Hyperlipidemia    • Hypertension    • IBS (irritable bowel syndrome)    • Liver disease    • Low back pain    • Obesity    • Seizure disorder (CMS/HCC)    • Type 2 diabetes mellitus (CMS/HCC)    •  Weight gain        Past Surgical History:   Procedure Laterality Date   • BACK SURGERY     • CARDIAC CATHETERIZATION      Abstraction from [facility]: [facility]? 1980's, 3-16   • CHOLECYSTECTOMY     • ENDOSCOPY  03/31/2015    Normal   • HERNIA REPAIR  01/19/2016    Dr Mota   • HYSTERECTOMY     • TONSILLECTOMY           Current Outpatient Medications:   •  albuterol (PROVENTIL) (2.5 MG/3ML) 0.083% nebulizer solution, Take 2.5 mg by nebulization Every 4 (Four) Hours As Needed for Wheezing., Disp: , Rfl:   •  ALPRAZolam (XANAX) 0.5 MG tablet, TAKE ONE TABLET BY MOUTH TWICE DAILY AS NEEDED FOR ANXIETY, Disp: 60 tablet, Rfl: 0  •  atorvastatin (LIPITOR) 10 MG tablet, Take 10 mg by mouth Daily., Disp: , Rfl:   •  cetirizine (zyrTEC) 10 MG tablet, Take 10 mg by mouth Daily., Disp: , Rfl:   •  cimetidine (TAGAMET) 400 MG tablet, Take 400 mg by mouth 2 (Two) Times a Day As Needed., Disp: , Rfl:   •  fluticasone-salmeterol (ADVAIR) 250-50 MCG/DOSE DISKUS, Inhale 1 puff 2 (Two) Times a Day., Disp: , Rfl:   •  furosemide (LASIX) 40 MG tablet, Take 40 mg by mouth Daily., Disp: , Rfl:   •  icosapent ethyl (VASCEPA) 1 g capsule capsule, Take 2 g by mouth Daily., Disp: , Rfl:   •  insulin aspart (novoLOG FLEXPEN) 100 UNIT/ML solution pen-injector sc pen, Inject 5 Units under the skin into the appropriate area as directed 3 (Three) Times a Day With Meals., Disp: , Rfl:   •  insulin degludec (TRESIBA FLEXTOUCH) 100 UNIT/ML solution pen-injector injection, Inject 14 Units under the skin into the appropriate area as directed Daily., Disp: , Rfl:   •  levothyroxine (SYNTHROID, LEVOTHROID) 50 MCG tablet, Take 50 mcg by mouth Daily., Disp: , Rfl:   •  lubiprostone (AMITIZA) 24 MCG capsule, Take 1 capsule by mouth 2 (Two) Times a Day With Meals., Disp: 60 capsule, Rfl: 11  •  metoprolol tartrate (LOPRESSOR) 25 MG tablet, Take 25 mg by mouth 2 (Two) Times a Day., Disp: , Rfl:   •  MULTIPLE VITAMINS ESSENTIAL PO, Take 1 tablet by mouth  Daily., Disp: , Rfl:   •  nitroglycerin (NITROSTAT) 0.4 MG SL tablet, Place 1 tablet under the tongue Every 5 (Five) Minutes As Needed for Chest Pain. Take no more than 3 doses in 15 minutes., Disp: 30 tablet, Rfl: 5  •  oxyCODONE-acetaminophen (Percocet)  MG per tablet, Take 1 tablet by mouth Every 6 (Six) Hours As Needed for Moderate Pain ., Disp: 120 tablet, Rfl: 0  •  pantoprazole (PROTONIX) 40 MG EC tablet, Take 40 mg by mouth Daily., Disp: , Rfl:   •  pregabalin (LYRICA) 100 MG capsule, Take 100 mg by mouth 2 (Two) Times a Day., Disp: , Rfl:   •  QUEtiapine (SEROquel) 100 MG tablet, Take 1 tablet by mouth Every Night., Disp: 30 tablet, Rfl: 2  •  SITagliptin-metFORMIN HCl ER (JANUMET XR)  MG tablet, Take 2 tablets by mouth Daily., Disp: , Rfl:   •  venlafaxine XR (EFFEXOR-XR) 150 MG 24 hr capsule, Take 2 capsules by mouth Daily., Disp: 60 capsule, Rfl: 2  •  amitriptyline (ELAVIL) 50 MG tablet, TAKE 1 TABLET BY MOUTH AT BEDTIME AS NEEDED FOR SLEEP, Disp: 30 tablet, Rfl: 2  •  ARIPiprazole (ABILIFY) 15 MG tablet, TAKE ONE TABLET BY MOUTH EVERY DAY, Disp: 30 tablet, Rfl: 1  •  buPROPion XL (Wellbutrin XL) 300 MG 24 hr tablet, Take 1 tablet by mouth Every Morning., Disp: 30 tablet, Rfl: 2  •  cyclobenzaprine (FLEXERIL) 10 MG tablet, Take 10 mg by mouth 3 (Three) Times a Day As Needed for Muscle Spasms., Disp: , Rfl:   •  diclofenac (VOLTAREN) 75 MG EC tablet, Take 75 mg by mouth 2 (Two) Times a Day., Disp: , Rfl: 0  •  isosorbide mononitrate (IMDUR) 30 MG 24 hr tablet, TAKE ONE BY MOUTH EVERY MORNING, Disp: 30 tablet, Rfl: 4    Allergies   Allergen Reactions   • Adhesive Tape Rash     Paper tape causes the rash   • Contrast Dye GI Intolerance   • Iodinated Diagnostic Agents Nausea Only   • Latex Rash   • Penicillins Hives   • Statins Rash     BAD LEG CRAMPS     • Sulfa Antibiotics Hives   • Morphine Rash       Family History   Problem Relation Age of Onset   • Cancer Mother         Other Cancer   •  "Heart disease Mother    • Hypertension Mother    • Stroke Mother    • Hypertension Father    • Kidney disease Father    • Diabetes Paternal Uncle    • Cancer Paternal Uncle         Colon Cancer       Social History     Tobacco Use   • Smoking status: Current Every Day Smoker     Packs/day: 1.00   • Smokeless tobacco: Never Used   • Tobacco comment: Passive Smoke: N   Substance Use Topics   • Alcohol use: No     Frequency: Never           Current Electrocardiogram:    ECG 12 Lead  Date/Time: 7/16/2020 4:14 PM  Performed by: Ansley Hernandez APRN  Authorized by: Ansley Hernandez APRN   Comparison: not compared with previous ECG   Rhythm: sinus rhythm  BPM: 99  Q waves: V1 and V2                  MONIK Sanchez  07/16/20  16:16      EMR Dragon/Transcription:   \"Dictated utilizing Dragon dictation\".         "

## 2020-07-16 ENCOUNTER — OFFICE VISIT (OUTPATIENT)
Dept: CARDIOLOGY | Facility: CLINIC | Age: 65
End: 2020-07-16

## 2020-07-16 VITALS
BODY MASS INDEX: 32.79 KG/M2 | WEIGHT: 191 LBS | OXYGEN SATURATION: 93 % | SYSTOLIC BLOOD PRESSURE: 113 MMHG | DIASTOLIC BLOOD PRESSURE: 68 MMHG | HEART RATE: 99 BPM

## 2020-07-16 DIAGNOSIS — E78.2 MIXED HYPERLIPIDEMIA: ICD-10-CM

## 2020-07-16 DIAGNOSIS — Q24.5 CONGENITAL CORONARY ARTERY FISTULA: ICD-10-CM

## 2020-07-16 DIAGNOSIS — J43.9 PULMONARY EMPHYSEMA, UNSPECIFIED EMPHYSEMA TYPE (HCC): ICD-10-CM

## 2020-07-16 DIAGNOSIS — I10 ESSENTIAL HYPERTENSION: ICD-10-CM

## 2020-07-16 DIAGNOSIS — R00.2 PALPITATIONS: Primary | ICD-10-CM

## 2020-07-16 PROCEDURE — 93000 ELECTROCARDIOGRAM COMPLETE: CPT | Performed by: NURSE PRACTITIONER

## 2020-07-16 PROCEDURE — 99214 OFFICE O/P EST MOD 30 MIN: CPT | Performed by: NURSE PRACTITIONER

## 2020-07-16 RX ORDER — NITROGLYCERIN 0.4 MG/1
0.4 TABLET SUBLINGUAL
Qty: 30 TABLET | Refills: 5 | Status: SHIPPED | OUTPATIENT
Start: 2020-07-16 | End: 2022-09-07

## 2020-07-16 NOTE — PATIENT INSTRUCTIONS
Increase Atorvastatin to  20 mg daily-take 2 of the 10mg that you have now.  Your new prescription will be for 20mg

## 2020-07-22 DIAGNOSIS — F41.1 GENERALIZED ANXIETY DISORDER: ICD-10-CM

## 2020-07-22 DIAGNOSIS — F51.05 INSOMNIA DUE TO MENTAL DISORDER: ICD-10-CM

## 2020-07-22 RX ORDER — ALPRAZOLAM 0.5 MG/1
TABLET ORAL
Qty: 60 TABLET | Refills: 1 | Status: SHIPPED | OUTPATIENT
Start: 2020-07-22 | End: 2020-11-24

## 2020-07-27 ENCOUNTER — LAB (OUTPATIENT)
Dept: LAB | Facility: HOSPITAL | Age: 65
End: 2020-07-27

## 2020-07-27 DIAGNOSIS — Z01.818 PREOP TESTING: Primary | ICD-10-CM

## 2020-07-27 PROCEDURE — U0004 COV-19 TEST NON-CDC HGH THRU: HCPCS

## 2020-07-27 PROCEDURE — U0002 COVID-19 LAB TEST NON-CDC: HCPCS

## 2020-07-27 PROCEDURE — C9803 HOPD COVID-19 SPEC COLLECT: HCPCS

## 2020-07-28 LAB
REF LAB TEST METHOD: NORMAL
SARS-COV-2 RNA RESP QL NAA+PROBE: NOT DETECTED

## 2020-07-29 ENCOUNTER — HOSPITAL ENCOUNTER (OUTPATIENT)
Dept: RESPIRATORY THERAPY | Facility: HOSPITAL | Age: 65
Discharge: HOME | End: 2020-07-29
Admitting: INTERNAL MEDICINE

## 2020-07-29 DIAGNOSIS — R06.00 DYSPNEA, UNSPECIFIED TYPE: ICD-10-CM

## 2020-07-29 PROCEDURE — A9270 NON-COVERED ITEM OR SERVICE: HCPCS | Performed by: INTERNAL MEDICINE

## 2020-07-29 PROCEDURE — 94729 DIFFUSING CAPACITY: CPT

## 2020-07-29 PROCEDURE — 94060 EVALUATION OF WHEEZING: CPT

## 2020-07-29 PROCEDURE — 63710000001 ALBUTEROL SULFATE HFA 108 (90 BASE) MCG/ACT AEROSOL SOLUTION: Performed by: INTERNAL MEDICINE

## 2020-07-29 PROCEDURE — 94618 PULMONARY STRESS TESTING: CPT

## 2020-07-29 PROCEDURE — 94727 GAS DIL/WSHOT DETER LNG VOL: CPT

## 2020-07-29 RX ORDER — ALBUTEROL SULFATE 90 UG/1
2 AEROSOL, METERED RESPIRATORY (INHALATION) ONCE
Status: COMPLETED | OUTPATIENT
Start: 2020-07-29 | End: 2020-07-29

## 2020-07-29 RX ADMIN — ALBUTEROL SULFATE 2 PUFF: 90 AEROSOL, METERED RESPIRATORY (INHALATION) at 09:13

## 2020-07-29 NOTE — PROGRESS NOTES
Exercise Oximetry    Patient Name:Marlena Avilez   MRN: [id number]   Date: 07/29/20             ROOM AIR BASELINE   SpO2%     92   Heart Rate      89   Blood Pressure      EXERCISE ON ROOM AIR SpO2% EXERCISE ON O2   2 @  LPM SpO2%   1 MINUTE 86 1 MINUTE 95   2 MINUTES  2 MINUTES 96   3 MINUTES  3 MINUTES 96   4 MINUTES  4 MINUTES 94   5 MINUTES  5 MINUTES 94   6 MINUTES  6 MINUTES 93              Distance Walked   600 feet Distance Walked   600 feet   Dyspnea (Kenisha Scale)   Dyspnea (Kenisha Scale)       Fatigue (Kenisha Scale)   Fatigue (Kenisha Scale)   SpO2% Post Exercise   SpO2% Post Exercise    97   HR Post Exercise   HR Post Exercise    88   Time to Recovery   Time to Recovery     Comments:   Pt walked slow  Steady  Pace  And  Soa  And  C/o  Fatigued.  Pt  Walked  4-5  Minutes  Then  Desaturated  To  86-87%.  Pt  Then  Placed  Immediately  On  2  Lpm  Nc.  o2  Saturations  Improved  To  95%  After  1-2 minutes.  Pt  Continued  To  Walk  And  Stated  She  Felt  Her  Breathing  Was  Easier  And  Less  Fatigued. Palmira Tanner, RRT

## 2020-08-05 ENCOUNTER — OFFICE VISIT (OUTPATIENT)
Dept: CARDIOLOGY | Facility: CLINIC | Age: 65
End: 2020-08-05

## 2020-08-05 VITALS
WEIGHT: 191 LBS | HEART RATE: 74 BPM | OXYGEN SATURATION: 92 % | BODY MASS INDEX: 32.61 KG/M2 | RESPIRATION RATE: 18 BRPM | DIASTOLIC BLOOD PRESSURE: 72 MMHG | HEIGHT: 64 IN | SYSTOLIC BLOOD PRESSURE: 112 MMHG

## 2020-08-05 DIAGNOSIS — I10 ESSENTIAL HYPERTENSION: ICD-10-CM

## 2020-08-05 DIAGNOSIS — J43.9 PULMONARY EMPHYSEMA, UNSPECIFIED EMPHYSEMA TYPE (HCC): ICD-10-CM

## 2020-08-05 DIAGNOSIS — R60.9 PERIPHERAL EDEMA: ICD-10-CM

## 2020-08-05 DIAGNOSIS — E78.2 MIXED HYPERLIPIDEMIA: ICD-10-CM

## 2020-08-05 DIAGNOSIS — R06.02 SHORTNESS OF BREATH: Primary | ICD-10-CM

## 2020-08-05 PROCEDURE — 99214 OFFICE O/P EST MOD 30 MIN: CPT | Performed by: INTERNAL MEDICINE

## 2020-08-05 PROCEDURE — 93000 ELECTROCARDIOGRAM COMPLETE: CPT | Performed by: INTERNAL MEDICINE

## 2020-08-05 RX ORDER — POTASSIUM CHLORIDE 20 MEQ/1
20 TABLET, EXTENDED RELEASE ORAL DAILY
Qty: 90 TABLET | Refills: 3 | Status: SHIPPED | OUTPATIENT
Start: 2020-08-05

## 2020-08-05 NOTE — PROGRESS NOTES
"Cardiology Office Visit      Encounter Date:  08/05/2020    Patient ID:   Marlena Avilez is a 65 y.o. female.    Reason For Followup:  Coronary artery disease    Brief Clinical History:  Dear Dr. Nunez, April L, APRN    I had the pleasure of seeing Marlena Avilez today. As you are well aware, this is a 65 y.o. female with an established history of nonobstructive coronary artery disease.  She is additional history that includes COPD, diabetes mellitus, hypertension, hyperlipidemia, palpitations, tobacco abuse, and antiplatelet therapy.  She presents today for follow-up on the above conditions.    Interval History:  She denies any chest pain pressure heaviness or tightness.  She denies any shortness of breath out of character.  She denies any PND orthopnea.  She denies any syncope or near syncope.    She is reporting lower extremity edema.  She reports that this developed about a week ago.  She reports that she is not consuming any more water than usual.  She is not missed any medications.  We discussed options.  We will increase her diuretic for a week, add potassium, try to reduce her fluid consumption and reevaluate in 1 month.    Assessment & Plan    Impressions:  Nonobstructive coronary artery disease cardiac catheterization April 2017  COPD  Diabetes mellitus  Hypertension  Hypertensive cardiovascular disease  Hyperlipidemia  Palpitations  Tobacco abuse  Antiplatelet therapy  Peripheral edema    Recommendations:  Increase furosemide to 40 mg twice daily for 1 week  2D echocardiogram  Potassium 20 mEq daily  Restrict fluid consumption to 2 L daily  Follow-up in 1 month    Objective:    Vitals:  Vitals:    08/05/20 1450   BP: 112/72   BP Location: Left arm   Patient Position: Sitting   Cuff Size: Large Adult   Pulse: 74   Resp: 18   SpO2: 92%   Weight: 86.6 kg (191 lb)   Height: 162.6 cm (64\")       Physical Exam:    General: Alert, cooperative, no distress, appears stated age  Head:  Normocephalic, atraumatic, " mucous membranes moist  Eyes:  Conjunctiva/corneas clear, EOM's intact     Neck:  Supple,  no bruit  Lungs: Coarse and diminished bilaterally  Chest wall: No tenderness  Heart::  Regular rate and rhythm, S1 and S2 normal, 1/6 holosystolic murmur.  No rub or gallop  Abdomen: Soft, non-tender, nondistended bowel sounds active  Extremities: No cyanosis, clubbing, 2+ pedal edema  Pulses: 2+ and symmetric all extremities  Skin:  No rashes or lesions  Neuro/psych: A&O x3. CN II through XII are grossly intact with appropriate affect      Allergies:  Allergies   Allergen Reactions   • Adhesive Tape Rash     Paper tape causes the rash   • Contrast Dye GI Intolerance   • Iodinated Diagnostic Agents Nausea Only   • Latex Rash   • Penicillins Hives   • Statins Rash     BAD LEG CRAMPS     • Sulfa Antibiotics Hives   • Morphine Rash       Medication Review:     Current Outpatient Medications:   •  albuterol (PROVENTIL) (2.5 MG/3ML) 0.083% nebulizer solution, Take 2.5 mg by nebulization Every 4 (Four) Hours As Needed for Wheezing., Disp: , Rfl:   •  ALPRAZolam (XANAX) 0.5 MG tablet, TAKE ONE TABLET BY MOUTH TWICE DAILY AS NEEDED FOR ANXIETY, Disp: 60 tablet, Rfl: 1  •  amitriptyline (ELAVIL) 50 MG tablet, TAKE 1 TABLET BY MOUTH AT BEDTIME AS NEEDED FOR SLEEP, Disp: 30 tablet, Rfl: 2  •  ARIPiprazole (ABILIFY) 15 MG tablet, TAKE ONE TABLET BY MOUTH EVERY DAY, Disp: 30 tablet, Rfl: 1  •  atorvastatin (LIPITOR) 10 MG tablet, Take 10 mg by mouth Daily., Disp: , Rfl:   •  buPROPion XL (Wellbutrin XL) 300 MG 24 hr tablet, Take 1 tablet by mouth Every Morning., Disp: 30 tablet, Rfl: 2  •  cetirizine (zyrTEC) 10 MG tablet, Take 10 mg by mouth Daily., Disp: , Rfl:   •  cimetidine (TAGAMET) 400 MG tablet, Take 400 mg by mouth 2 (Two) Times a Day As Needed., Disp: , Rfl:   •  cyclobenzaprine (FLEXERIL) 10 MG tablet, Take 10 mg by mouth 3 (Three) Times a Day As Needed for Muscle Spasms., Disp: , Rfl:   •  diclofenac (VOLTAREN) 75 MG EC  tablet, Take 75 mg by mouth 2 (Two) Times a Day., Disp: , Rfl: 0  •  fluticasone-salmeterol (ADVAIR) 250-50 MCG/DOSE DISKUS, Inhale 1 puff 2 (Two) Times a Day., Disp: , Rfl:   •  furosemide (LASIX) 40 MG tablet, Take 40 mg by mouth Daily., Disp: , Rfl:   •  icosapent ethyl (VASCEPA) 1 g capsule capsule, Take 2 g by mouth Daily., Disp: , Rfl:   •  insulin aspart (novoLOG FLEXPEN) 100 UNIT/ML solution pen-injector sc pen, Inject 5 Units under the skin into the appropriate area as directed 3 (Three) Times a Day With Meals., Disp: , Rfl:   •  insulin degludec (TRESIBA FLEXTOUCH) 100 UNIT/ML solution pen-injector injection, Inject 14 Units under the skin into the appropriate area as directed Daily., Disp: , Rfl:   •  isosorbide mononitrate (IMDUR) 30 MG 24 hr tablet, TAKE ONE BY MOUTH EVERY MORNING, Disp: 30 tablet, Rfl: 4  •  levothyroxine (SYNTHROID, LEVOTHROID) 50 MCG tablet, Take 50 mcg by mouth Daily., Disp: , Rfl:   •  lubiprostone (AMITIZA) 24 MCG capsule, Take 1 capsule by mouth 2 (Two) Times a Day With Meals., Disp: 60 capsule, Rfl: 11  •  metoprolol tartrate (LOPRESSOR) 25 MG tablet, Take 25 mg by mouth 2 (Two) Times a Day., Disp: , Rfl:   •  MULTIPLE VITAMINS ESSENTIAL PO, Take 1 tablet by mouth Daily., Disp: , Rfl:   •  nitroglycerin (NITROSTAT) 0.4 MG SL tablet, Place 1 tablet under the tongue Every 5 (Five) Minutes As Needed for Chest Pain. Take no more than 3 doses in 15 minutes., Disp: 30 tablet, Rfl: 5  •  oxyCODONE-acetaminophen (Percocet)  MG per tablet, Take 1 tablet by mouth Every 6 (Six) Hours As Needed for Moderate Pain ., Disp: 120 tablet, Rfl: 0  •  pantoprazole (PROTONIX) 40 MG EC tablet, Take 40 mg by mouth Daily., Disp: , Rfl:   •  pregabalin (LYRICA) 100 MG capsule, Take 100 mg by mouth 2 (Two) Times a Day., Disp: , Rfl:   •  QUEtiapine (SEROquel) 100 MG tablet, Take 1 tablet by mouth Every Night., Disp: 30 tablet, Rfl: 2  •  SITagliptin-metFORMIN HCl ER (JANUMET XR)  MG tablet,  Take 2 tablets by mouth Daily., Disp: , Rfl:   •  venlafaxine XR (EFFEXOR-XR) 150 MG 24 hr capsule, Take 2 capsules by mouth Daily., Disp: 60 capsule, Rfl: 2  •  potassium chloride (K-DUR,KLOR-CON) 20 MEQ CR tablet, Take 1 tablet by mouth Daily., Disp: 90 tablet, Rfl: 3    Family History:  Family History   Problem Relation Age of Onset   • Cancer Mother         Other Cancer   • Heart disease Mother    • Hypertension Mother    • Stroke Mother    • Hypertension Father    • Kidney disease Father    • Diabetes Paternal Uncle    • Cancer Paternal Uncle         Colon Cancer       Past Medical History:  Past Medical History:   Diagnosis Date   • Anemia    • Anxiety disorder    • Arthritis    • Atrial fibrillation (CMS/HCC)     Proxysmal   • Chronic kidney disease    • COPD (chronic obstructive pulmonary disease) (CMS/HCC)    • Dark stools    • Depression    • Disease of thyroid gland    • GERD (gastroesophageal reflux disease)    • History of hernia repair    • Hyperlipidemia    • Hypertension    • IBS (irritable bowel syndrome)    • Liver disease    • Low back pain    • Obesity    • Seizure disorder (CMS/HCC)    • Type 2 diabetes mellitus (CMS/HCC)    • Weight gain        Past surgical History:  Past Surgical History:   Procedure Laterality Date   • BACK SURGERY     • CARDIAC CATHETERIZATION      [facility] 1980's, 3-16   • CHOLECYSTECTOMY     • ENDOSCOPY  03/31/2015    Normal   • HERNIA REPAIR  01/19/2016    Dr Mota   • HYSTERECTOMY     • TONSILLECTOMY         Social History:  Social History     Socioeconomic History   • Marital status: Single     Spouse name: Not on file   • Number of children: Not on file   • Years of education: Not on file   • Highest education level: Not on file   Tobacco Use   • Smoking status: Current Every Day Smoker     Packs/day: 1.00   • Smokeless tobacco: Never Used   • Tobacco comment: Passive Smoke: N   Substance and Sexual Activity   • Alcohol use: No      Frequency: Never   • Drug use: Defer   • Sexual activity: Defer       Review of Systems:  The following systems were reviewed as they relate to the cardiovascular system: Constitutional, Eyes, ENT, Cardiovascular, Respiratory, Gastrointestinal, Integumentary, Neurological, Psychiatric, Hematologic, Endocrine, Musculoskeletal, and Genitourinary. The pertinent cardiovascular findings are reported above with all other cardiovascular points within those systems being negative.    Diagnostic Study Review:     Current Electrocardiogram:    ECG 12 Lead  Date/Time: 8/5/2020 7:46 PM  Performed by: Hector Roa DO  Authorized by: Hector Roa DO   Comparison: not compared with previous ECG   Previous ECG: no previous ECG available  Comments: Normal sinus rhythm with a ventricular rate of 79 bpm.  Borderline right axis deviation.  Low voltage in the precordial leads.  Normal QT and slightly prolonged QTC intervals of 411 and 472 ms respectively.              NOTE: The following portions of the patient's history were reviewed and updated this visit as appropriate: allergies, current medications, past family history, past medical history, past social history, past surgical history and problem list.

## 2020-08-06 RX ORDER — FUROSEMIDE 40 MG/1
40 TABLET ORAL 2 TIMES DAILY
Qty: 14 TABLET | Refills: 0 | Status: SHIPPED | OUTPATIENT
Start: 2020-08-06 | End: 2021-02-26 | Stop reason: SDUPTHER

## 2020-08-12 DIAGNOSIS — F34.1 DYSTHYMIA: ICD-10-CM

## 2020-08-12 RX ORDER — ARIPIPRAZOLE 15 MG/1
TABLET ORAL
Qty: 30 TABLET | Refills: 1 | Status: SHIPPED | OUTPATIENT
Start: 2020-08-12 | End: 2020-11-02

## 2020-08-12 RX ORDER — VENLAFAXINE HYDROCHLORIDE 150 MG/1
300 CAPSULE, EXTENDED RELEASE ORAL DAILY
Qty: 60 CAPSULE | Refills: 1 | Status: SHIPPED | OUTPATIENT
Start: 2020-08-12 | End: 2020-11-02

## 2020-08-12 RX ORDER — BUPROPION HYDROCHLORIDE 300 MG/1
300 TABLET ORAL EVERY MORNING
Qty: 30 TABLET | Refills: 1 | Status: SHIPPED | OUTPATIENT
Start: 2020-08-12 | End: 2020-11-02

## 2020-08-23 ENCOUNTER — HOSPITAL ENCOUNTER (OUTPATIENT)
Dept: SLEEP MEDICINE | Facility: HOSPITAL | Age: 65
Discharge: HOME | End: 2020-08-23
Admitting: INTERNAL MEDICINE

## 2020-08-23 VITALS — HEIGHT: 64 IN | BODY MASS INDEX: 32.59 KG/M2 | WEIGHT: 190.92 LBS

## 2020-08-23 DIAGNOSIS — G47.33 OBSTRUCTIVE SLEEP APNEA (ADULT) (PEDIATRIC): ICD-10-CM

## 2020-08-23 PROCEDURE — 95810 POLYSOM 6/> YRS 4/> PARAM: CPT

## 2020-08-24 ENCOUNTER — HOSPITAL ENCOUNTER (OUTPATIENT)
Dept: CARDIOLOGY | Facility: HOSPITAL | Age: 65
Discharge: HOME | End: 2020-08-24
Admitting: INTERNAL MEDICINE

## 2020-08-24 VITALS
HEIGHT: 64 IN | HEART RATE: 79 BPM | BODY MASS INDEX: 32.61 KG/M2 | SYSTOLIC BLOOD PRESSURE: 126 MMHG | DIASTOLIC BLOOD PRESSURE: 65 MMHG | WEIGHT: 191 LBS

## 2020-08-24 DIAGNOSIS — R06.02 SHORTNESS OF BREATH: ICD-10-CM

## 2020-08-24 PROCEDURE — 93306 TTE W/DOPPLER COMPLETE: CPT

## 2020-08-25 LAB
ASCENDING AORTA: 2.8 CM
BH CV ECHO MEAS - ACS: 2 CM
BH CV ECHO MEAS - AO MAX PG (FULL): 2.6 MMHG
BH CV ECHO MEAS - AO MAX PG: 6.6 MMHG
BH CV ECHO MEAS - AO MEAN PG (FULL): 1.4 MMHG
BH CV ECHO MEAS - AO MEAN PG: 3.7 MMHG
BH CV ECHO MEAS - AO ROOT AREA (BSA CORRECTED): 1.6
BH CV ECHO MEAS - AO ROOT AREA: 7.4 CM^2
BH CV ECHO MEAS - AO ROOT DIAM: 3.1 CM
BH CV ECHO MEAS - AO V2 MAX: 128.1 CM/SEC
BH CV ECHO MEAS - AO V2 MEAN: 90.6 CM/SEC
BH CV ECHO MEAS - AO V2 VTI: 26.7 CM
BH CV ECHO MEAS - ASC AORTA: 2.8 CM
BH CV ECHO MEAS - AVA(I,A): 2.6 CM^2
BH CV ECHO MEAS - AVA(I,D): 2.6 CM^2
BH CV ECHO MEAS - AVA(V,A): 2.4 CM^2
BH CV ECHO MEAS - AVA(V,D): 2.4 CM^2
BH CV ECHO MEAS - BSA(HAYCOCK): 2 M^2
BH CV ECHO MEAS - BSA: 1.9 M^2
BH CV ECHO MEAS - BZI_BMI: 32.8 KILOGRAMS/M^2
BH CV ECHO MEAS - BZI_METRIC_HEIGHT: 162.6 CM
BH CV ECHO MEAS - BZI_METRIC_WEIGHT: 86.6 KG
BH CV ECHO MEAS - EDV(CUBED): 94.7 ML
BH CV ECHO MEAS - EDV(MOD-SP2): 72.5 ML
BH CV ECHO MEAS - EDV(MOD-SP4): 72.2 ML
BH CV ECHO MEAS - EDV(TEICH): 95.3 ML
BH CV ECHO MEAS - EF(CUBED): 78.5 %
BH CV ECHO MEAS - EF(MOD-BP): 68 %
BH CV ECHO MEAS - EF(MOD-SP2): 65.2 %
BH CV ECHO MEAS - EF(MOD-SP4): 69.3 %
BH CV ECHO MEAS - EF(TEICH): 70.8 %
BH CV ECHO MEAS - ESV(CUBED): 20.4 ML
BH CV ECHO MEAS - ESV(MOD-SP2): 25.2 ML
BH CV ECHO MEAS - ESV(MOD-SP4): 22.1 ML
BH CV ECHO MEAS - ESV(TEICH): 27.8 ML
BH CV ECHO MEAS - FS: 40.1 %
BH CV ECHO MEAS - IVS/LVPW: 1.1
BH CV ECHO MEAS - IVSD: 1.2 CM
BH CV ECHO MEAS - LA DIMENSION(2D): 3.9 CM
BH CV ECHO MEAS - LA DIMENSION: 3.7 CM
BH CV ECHO MEAS - LA/AO: 1.2
BH CV ECHO MEAS - LAT PEAK E' VEL: 9 CM/SEC
BH CV ECHO MEAS - LV DIASTOLIC VOL/BSA (35-75): 37.6 ML/M^2
BH CV ECHO MEAS - LV IVRT: 0.05 SEC
BH CV ECHO MEAS - LV MASS(C)D: 182.1 GRAMS
BH CV ECHO MEAS - LV MASS(C)DI: 94.9 GRAMS/M^2
BH CV ECHO MEAS - LV MAX PG: 3.9 MMHG
BH CV ECHO MEAS - LV MEAN PG: 2.3 MMHG
BH CV ECHO MEAS - LV SYSTOLIC VOL/BSA (12-30): 11.5 ML/M^2
BH CV ECHO MEAS - LV V1 MAX: 99 CM/SEC
BH CV ECHO MEAS - LV V1 MEAN: 71.4 CM/SEC
BH CV ECHO MEAS - LV V1 VTI: 22.5 CM
BH CV ECHO MEAS - LVIDD: 4.6 CM
BH CV ECHO MEAS - LVIDS: 2.7 CM
BH CV ECHO MEAS - LVOT AREA: 3.1 CM^2
BH CV ECHO MEAS - LVOT DIAM: 2 CM
BH CV ECHO MEAS - LVPWD: 1.1 CM
BH CV ECHO MEAS - MED PEAK E' VEL: 7 CM/SEC
BH CV ECHO MEAS - MV A MAX VEL: 90 CM/SEC
BH CV ECHO MEAS - MV DEC SLOPE: 468 CM/SEC^2
BH CV ECHO MEAS - MV DEC TIME: 0.21 SEC
BH CV ECHO MEAS - MV E MAX VEL: 99.9 CM/SEC
BH CV ECHO MEAS - MV E/A: 1.1
BH CV ECHO MEAS - MV MAX PG: 4 MMHG
BH CV ECHO MEAS - MV MEAN PG: 2.1 MMHG
BH CV ECHO MEAS - MV P1/2T: 56 MSEC
BH CV ECHO MEAS - MV V2 MAX: 99.5 CM/SEC
BH CV ECHO MEAS - MV V2 MEAN: 69.8 CM/SEC
BH CV ECHO MEAS - MV V2 VTI: 19.9 CM
BH CV ECHO MEAS - MVA(P1/2T): 3.9 CM2
BH CV ECHO MEAS - MVA(VTI): 3.5 CM^2
BH CV ECHO MEAS - PA ACC SLOPE: 454 CM/SEC2
BH CV ECHO MEAS - PA ACC TIME: 0.14 SEC
BH CV ECHO MEAS - PA MAX PG (FULL): 0.98 MMHG
BH CV ECHO MEAS - PA MAX PG: 2.9 MMHG
BH CV ECHO MEAS - PA MEAN PG (FULL): 0.74 MMHG
BH CV ECHO MEAS - PA MEAN PG: 1.7 MMHG
BH CV ECHO MEAS - PA PR(ACCEL): 16.2 MMHG
BH CV ECHO MEAS - PA V2 MAX: 84.5 CM/SEC
BH CV ECHO MEAS - PA V2 MEAN: 62.8 CM/SEC
BH CV ECHO MEAS - PA V2 VTI: 17.7 CM
BH CV ECHO MEAS - PAPD(PI EDV): 7.3 MMHG
BH CV ECHO MEAS - PI END-D VEL: 103.4 CM/SEC
BH CV ECHO MEAS - PULM A REVS DUR: 0.08 SEC
BH CV ECHO MEAS - PULM A REVS VEL: 27.6 CM/SEC
BH CV ECHO MEAS - PULM DIAS VEL: 40.5 CM/SEC
BH CV ECHO MEAS - PULM S/D: 1.2
BH CV ECHO MEAS - PULM SYS VEL: 50.4 CM/SEC
BH CV ECHO MEAS - RAP SYSTOLE: 3 MMHG
BH CV ECHO MEAS - RV MAX PG: 1.9 MMHG
BH CV ECHO MEAS - RV MEAN PG: 1 MMHG
BH CV ECHO MEAS - RV V1 MAX: 68.5 CM/SEC
BH CV ECHO MEAS - RV V1 MEAN: 47.2 CM/SEC
BH CV ECHO MEAS - RV V1 VTI: 14 CM
BH CV ECHO MEAS - RVSP: 28.3 MMHG
BH CV ECHO MEAS - SI(AO): 102.6 ML/M^2
BH CV ECHO MEAS - SI(CUBED): 38.7 ML/M^2
BH CV ECHO MEAS - SI(LVOT): 36.4 ML/M^2
BH CV ECHO MEAS - SI(MOD-SP2): 24.7 ML/M^2
BH CV ECHO MEAS - SI(MOD-SP4): 26.1 ML/M^2
BH CV ECHO MEAS - SI(TEICH): 35.2 ML/M^2
BH CV ECHO MEAS - SV(AO): 196.8 ML
BH CV ECHO MEAS - SV(CUBED): 74.3 ML
BH CV ECHO MEAS - SV(LVOT): 69.8 ML
BH CV ECHO MEAS - SV(MOD-SP2): 47.3 ML
BH CV ECHO MEAS - SV(MOD-SP4): 50.1 ML
BH CV ECHO MEAS - SV(TEICH): 67.4 ML
BH CV ECHO MEAS - TAPSE (>1.6): 2.9 CM2
BH CV ECHO MEAS - TR MAX PG: 25 MMHG
BH CV ECHO MEAS - TR MAX VEL: 251.7 CM/SEC
BH CV ECHO MEASUREMENTS AVERAGE E/E' RATIO: 12.49
BH CV XLRA - RV BASE: 2.7 CM
BH CV XLRA - RV MID: 2 CM
BH CV XLRA - TDI S': 15 CM/SEC
IVRT: 49 MSEC
LEFT ATRIUM VOLUME INDEX: 27 ML/M2
LEFT ATRIUM VOLUME: 51 CM3

## 2020-08-25 PROCEDURE — 93306 TTE W/DOPPLER COMPLETE: CPT | Performed by: INTERNAL MEDICINE

## 2020-08-28 ENCOUNTER — TELEPHONE (OUTPATIENT)
Dept: SLEEP MEDICINE | Facility: HOSPITAL | Age: 65
End: 2020-08-28

## 2020-08-28 ENCOUNTER — OFFICE VISIT (OUTPATIENT)
Dept: PAIN MEDICINE | Facility: CLINIC | Age: 65
End: 2020-08-28

## 2020-08-28 VITALS
SYSTOLIC BLOOD PRESSURE: 134 MMHG | HEIGHT: 64 IN | BODY MASS INDEX: 33.97 KG/M2 | TEMPERATURE: 98.2 F | WEIGHT: 199 LBS | HEART RATE: 94 BPM | OXYGEN SATURATION: 96 % | DIASTOLIC BLOOD PRESSURE: 83 MMHG | RESPIRATION RATE: 16 BRPM

## 2020-08-28 DIAGNOSIS — K59.03 CONSTIPATION DUE TO PAIN MEDICATION: ICD-10-CM

## 2020-08-28 DIAGNOSIS — M79.605 LEG PAIN, BILATERAL: ICD-10-CM

## 2020-08-28 DIAGNOSIS — M79.7 FIBROMYOSITIS: ICD-10-CM

## 2020-08-28 DIAGNOSIS — M54.40 CHRONIC MIDLINE LOW BACK PAIN WITH SCIATICA, SCIATICA LATERALITY UNSPECIFIED: Primary | ICD-10-CM

## 2020-08-28 DIAGNOSIS — M25.562 ARTHRALGIA OF LEFT KNEE: ICD-10-CM

## 2020-08-28 DIAGNOSIS — R10.84 GENERALIZED ABDOMINAL PAIN: ICD-10-CM

## 2020-08-28 DIAGNOSIS — M54.16 LUMBAR RADICULOPATHY: ICD-10-CM

## 2020-08-28 DIAGNOSIS — M54.12 CERVICAL RADICULOPATHY: ICD-10-CM

## 2020-08-28 DIAGNOSIS — M47.816 LUMBAR SPONDYLOSIS: ICD-10-CM

## 2020-08-28 DIAGNOSIS — G89.29 CHRONIC MIDLINE LOW BACK PAIN WITH SCIATICA, SCIATICA LATERALITY UNSPECIFIED: Primary | ICD-10-CM

## 2020-08-28 DIAGNOSIS — M48.061 SPINAL STENOSIS OF LUMBAR REGION, UNSPECIFIED WHETHER NEUROGENIC CLAUDICATION PRESENT: ICD-10-CM

## 2020-08-28 DIAGNOSIS — M79.604 LEG PAIN, BILATERAL: ICD-10-CM

## 2020-08-28 PROCEDURE — G0463 HOSPITAL OUTPT CLINIC VISIT: HCPCS | Performed by: PHYSICAL MEDICINE & REHABILITATION

## 2020-08-28 PROCEDURE — 99214 OFFICE O/P EST MOD 30 MIN: CPT | Performed by: PHYSICAL MEDICINE & REHABILITATION

## 2020-08-28 RX ORDER — LUBIPROSTONE 24 UG/1
24 CAPSULE ORAL 2 TIMES DAILY WITH MEALS
Qty: 60 CAPSULE | Refills: 11 | Status: SHIPPED | OUTPATIENT
Start: 2020-08-28 | End: 2021-08-11

## 2020-08-28 RX ORDER — OXYCODONE AND ACETAMINOPHEN 10; 325 MG/1; MG/1
1 TABLET ORAL EVERY 6 HOURS PRN
Qty: 120 TABLET | Refills: 0 | Status: SHIPPED | OUTPATIENT
Start: 2020-08-28 | End: 2020-10-26

## 2020-08-28 RX ORDER — OXYCODONE AND ACETAMINOPHEN 10; 325 MG/1; MG/1
1 TABLET ORAL EVERY 6 HOURS PRN
Qty: 120 TABLET | Refills: 0 | Status: SHIPPED | OUTPATIENT
Start: 2020-08-28 | End: 2020-08-28 | Stop reason: SDUPTHER

## 2020-08-28 NOTE — PROGRESS NOTES
Subjective   Marlena Avilez is a 65 y.o. female.     Low back and yen. legs pain,   Lumbar Back Pain with BLLE Pain,  RLE Numbness and RLE Weakness,  Lumbar Back Pain is increased with prolonged sitting.  Patient planned Lumbar Epidural #1 with Dr. Pleitez, reports very little relief with LESIs. Pain is 9/10 at best, aching, throbbing, worse with bending and standing, interferes with ADLs, activity, failed injections, meds. MRI L-spine with laminectomy and fusion. Taking Zohydro 15mg BID with Dr. Pleitez with poor relief, Norco 10mg TID with PCP with poor relief, had good relief in past with Percocet 10mg TID prn, restarted, but less relief than Hydrocodone, worsening pain. Current patient of this clinic. Rotated to Norco 10mg QID prn, working well initially but pain worsening, rotated to MS-Contin 15mg TID. C/o constipation controlled by fiber supplement.       The following portions of the patient's history were reviewed and updated as appropriate: allergies, current medications, past family history, past medical history, past social history, past surgical history and problem list.    Review of Systems   Constitutional: Negative for chills, fatigue and fever.   HENT: Positive for hearing loss. Negative for trouble swallowing.    Eyes: Positive for visual disturbance.   Respiratory: Positive for shortness of breath.    Cardiovascular: Negative for chest pain.   Gastrointestinal: Negative for abdominal pain, constipation, diarrhea, nausea and vomiting.   Genitourinary: Negative for urinary incontinence.   Musculoskeletal: Negative for arthralgias, back pain, joint swelling, myalgias and neck pain.   Neurological: Positive for headache. Negative for dizziness, weakness and numbness.       Objective   Physical Exam   Constitutional: She is oriented to person, place, and time. She appears well-developed and well-nourished.   HENT:   Head: Normocephalic and atraumatic.   Eyes: Pupils are equal, round, and reactive to light.  EOM are normal.   Neck: Normal range of motion.   Cardiovascular: Normal rate, regular rhythm, normal heart sounds and intact distal pulses.   Pulmonary/Chest: Breath sounds normal.   Abdominal: Soft. Bowel sounds are normal. She exhibits no distension. There is no tenderness.   Musculoskeletal:   Strength 4/5 globally   Neurological: She is alert and oriented to person, place, and time. She has normal strength and normal reflexes. She displays normal reflexes. No sensory deficit.   Psychiatric: She has a normal mood and affect. Her behavior is normal. Thought content normal.         Assessment/Plan   Marlena was seen today for back pain.    Diagnoses and all orders for this visit:    Chronic midline low back pain with sciatica, sciatica laterality unspecified    Lumbar radiculopathy    Leg pain, bilateral    Generalized abdominal pain    Cervical radiculopathy    Constipation due to pain medication    Arthralgia of left knee    Fibromyositis    Lumbar spondylosis    Spinal stenosis of lumbar region, unspecified whether neurogenic claudication present        Inspect reviewed, in order. UDS 12/30/19 in order.  Stopped Zohydro, Norco. Failed Percocet 10mg TID prn which had worked well in past   Began Norco 10mg QID prn, was working well, tolerant. Completely failed MS-Contin 15mg TID, denies allergy to morphine.   Restarted Percocet 10mg QID prn, doing well.  Cont Zanaflex 2mg qHS prn.  Failed fiber supplement, OTC Colace, Miralax. Begin Amitiza 24mcg BID for worsening OIC.  RTC 3 months for f/u.

## 2020-09-09 DIAGNOSIS — F51.05 INSOMNIA DUE TO MENTAL DISORDER: ICD-10-CM

## 2020-09-09 RX ORDER — ISOSORBIDE MONONITRATE 30 MG/1
TABLET, EXTENDED RELEASE ORAL
Qty: 30 TABLET | Refills: 3 | Status: SHIPPED | OUTPATIENT
Start: 2020-09-09 | End: 2020-12-30

## 2020-09-09 RX ORDER — QUETIAPINE FUMARATE 100 MG/1
TABLET, FILM COATED ORAL
Qty: 30 TABLET | Refills: 1 | Status: SHIPPED | OUTPATIENT
Start: 2020-09-09 | End: 2020-09-28 | Stop reason: DRUGHIGH

## 2020-09-17 ENCOUNTER — OFFICE VISIT (OUTPATIENT)
Dept: CARDIOLOGY | Facility: CLINIC | Age: 65
End: 2020-09-17

## 2020-09-17 VITALS
DIASTOLIC BLOOD PRESSURE: 80 MMHG | BODY MASS INDEX: 32.96 KG/M2 | OXYGEN SATURATION: 95 % | SYSTOLIC BLOOD PRESSURE: 124 MMHG | WEIGHT: 192 LBS | HEART RATE: 100 BPM

## 2020-09-17 DIAGNOSIS — E66.01 MORBID OBESITY (HCC): ICD-10-CM

## 2020-09-17 DIAGNOSIS — I48.0 PAROXYSMAL ATRIAL FIBRILLATION (HCC): ICD-10-CM

## 2020-09-17 DIAGNOSIS — E11.49 OTHER DIABETIC NEUROLOGICAL COMPLICATION ASSOCIATED WITH TYPE 2 DIABETES MELLITUS (HCC): ICD-10-CM

## 2020-09-17 DIAGNOSIS — I50.33 ACUTE ON CHRONIC DIASTOLIC CONGESTIVE HEART FAILURE (HCC): ICD-10-CM

## 2020-09-17 PROCEDURE — 99213 OFFICE O/P EST LOW 20 MIN: CPT | Performed by: NURSE PRACTITIONER

## 2020-09-17 NOTE — PROGRESS NOTES
[facility] CARDIOLOGY      REASON FOR FOLLOW-UP:  Follow-up echo  Lower extremity edema          Chief Complaint   Patient presents with   • Hypertension     1 mo f/u on Echo    • Hyperlipidemia         Dear April,        History of Present Illness     It was my pleasure to see Marlena in the office today.  As you are aware, she is a very pleasant 65 y.o. female with an established history of nonobstructive coronary artery disease.  She is additional history that includes COPD, diabetes mellitus, hypertension, hyperlipidemia, palpitations, tobacco abuse, and antiplatelet therapy.  She was evaluated recently for lower extremity edema.  2D echo was performed that showed normal LV systolic function with EF 66-70%, mild concentric LVH with grade 1 diastolic dysfunction.  She presents today to follow-up from echo.    Today, the patient continues to have some lower extremity edema, mild-1+ nonpitting.  She denies any actual lower extremity pain.  She denies any shortness of breath, abdominal distention, dizziness or lightheadedness.  We discussed fluid intake, and patient does describe a significant amount of fluid consumed during the day.  She does try to manage her salt intake.        Assessment:  Nonobstructive coronary artery disease cardiac catheterization April 2017  COPD  Diabetes mellitus  Hypertension  Hypertensive cardiovascular disease  Hyperlipidemia  Palpitations  Tobacco abuse  Antiplatelet therapy  Peripheral edema        Plan:  Decrease overall fluid intake  Continue to monitor salt intake  Recommend smoking cessation  Follow-up in 1 month or sooner if needed        The following portions of the patient's history were reviewed and updated as appropriate: allergies, current medications, past family history, past medical history, past social history, past surgical history and problem list.    REVIEW OF SYSTEMS:    Review of Systems   Cardiovascular: Positive for leg swelling.    Psychiatric/Behavioral: Positive for depression.   All other systems reviewed and are negative.      Vitals:    09/17/20 1439   BP: 124/80   Pulse: 100   SpO2: 95%         PHYSICAL EXAM:    General: Alert, cooperative, no distress, appears stated age  Head:  Normocephalic, atraumatic, mucous membranes moist  Eyes:  Conjunctiva/corneas clear, EOM's intact     Neck:  Supple,  no JVD or bruit     Lungs: Crackles in bases bilaterally  Chest wall: No tenderness  Musculoskeletal:   Ambulates slowly with no assistive device  Heart::  Regular rate and rhythm, S1 and S2 normal, no murmur, rub or gallop  Abdomen: Soft, non-tender, nondistended, bowel sounds active, no abdominal bruit  Extremities: No cyanosis, clubbing.  1+ edema to lower extremities  Pulses: 2+ and symmetric all extremities  Skin:  No rashes or lesions  Neuro/psych: A&O x3. CN II through XII are grossly intact with appropriate affect        Past Medical History:   Diagnosis Date   • Anemia    • Anxiety disorder    • Arthritis    • Atrial fibrillation (CMS/HCC)     Proxysmal   • Chronic kidney disease    • COPD (chronic obstructive pulmonary disease) (CMS/HCC)    • Dark stools    • Depression    • Disease of thyroid gland    • GERD (gastroesophageal reflux disease)    • History of hernia repair    • Hyperlipidemia    • Hypertension    • IBS (irritable bowel syndrome)    • Liver disease    • Low back pain    • Obesity    • Seizure disorder (CMS/HCC)    • Type 2 diabetes mellitus (CMS/HCC)    • Weight gain        Past Surgical History:   Procedure Laterality Date   • BACK SURGERY     • CARDIAC CATHETERIZATION      Abstraction from [facility]: [facility]? 1980's, 3-16   • CHOLECYSTECTOMY     • ENDOSCOPY  03/31/2015    Normal   • HERNIA REPAIR  01/19/2016    Dr Mota   • HYSTERECTOMY     • TONSILLECTOMY           Current Outpatient Medications:   •  albuterol (PROVENTIL) (2.5 MG/3ML) 0.083% nebulizer solution, Take 2.5 mg by nebulization Every 4 (Four) Hours As  Needed for Wheezing., Disp: , Rfl:   •  ALPRAZolam (XANAX) 0.5 MG tablet, TAKE ONE TABLET BY MOUTH TWICE DAILY AS NEEDED FOR ANXIETY, Disp: 60 tablet, Rfl: 1  •  amitriptyline (ELAVIL) 50 MG tablet, TAKE 1 TABLET BY MOUTH AT BEDTIME AS NEEDED FOR SLEEP, Disp: 30 tablet, Rfl: 2  •  ARIPiprazole (ABILIFY) 15 MG tablet, TAKE ONE TABLET BY MOUTH EVERY DAY, Disp: 30 tablet, Rfl: 1  •  atorvastatin (LIPITOR) 10 MG tablet, Take 10 mg by mouth Daily., Disp: , Rfl:   •  buPROPion XL (WELLBUTRIN XL) 300 MG 24 hr tablet, TAKE 1 TABLET BY MOUTH EVERY MORNING, Disp: 30 tablet, Rfl: 1  •  cetirizine (zyrTEC) 10 MG tablet, Take 10 mg by mouth Daily., Disp: , Rfl:   •  cimetidine (TAGAMET) 400 MG tablet, Take 400 mg by mouth 2 (Two) Times a Day As Needed., Disp: , Rfl:   •  cyclobenzaprine (FLEXERIL) 10 MG tablet, Take 10 mg by mouth 3 (Three) Times a Day As Needed for Muscle Spasms., Disp: , Rfl:   •  diclofenac (VOLTAREN) 75 MG EC tablet, Take 75 mg by mouth 2 (Two) Times a Day., Disp: , Rfl: 0  •  fluticasone-salmeterol (ADVAIR) 250-50 MCG/DOSE DISKUS, Inhale 1 puff 2 (Two) Times a Day., Disp: , Rfl:   •  furosemide (LASIX) 40 MG tablet, Take 1 tablet by mouth 2 (Two) Times a Day. For 1 week, Disp: 14 tablet, Rfl: 0  •  icosapent ethyl (VASCEPA) 1 g capsule capsule, Take 2 g by mouth Daily., Disp: , Rfl:   •  insulin aspart (novoLOG FLEXPEN) 100 UNIT/ML solution pen-injector sc pen, Inject 5 Units under the skin into the appropriate area as directed 3 (Three) Times a Day With Meals., Disp: , Rfl:   •  insulin degludec (TRESIBA FLEXTOUCH) 100 UNIT/ML solution pen-injector injection, Inject 14 Units under the skin into the appropriate area as directed Daily., Disp: , Rfl:   •  isosorbide mononitrate (IMDUR) 30 MG 24 hr tablet, TAKE ONE BY MOUTH EVERY MORNING, Disp: 30 tablet, Rfl: 3  •  levothyroxine (SYNTHROID, LEVOTHROID) 50 MCG tablet, Take 50 mcg by mouth Daily., Disp: , Rfl:   •  lubiprostone (AMITIZA) 24 MCG capsule, Take 1  capsule by mouth 2 (Two) Times a Day With Meals., Disp: 60 capsule, Rfl: 11  •  metoprolol tartrate (LOPRESSOR) 25 MG tablet, Take 25 mg by mouth 2 (Two) Times a Day., Disp: , Rfl:   •  MULTIPLE VITAMINS ESSENTIAL PO, Take 1 tablet by mouth Daily., Disp: , Rfl:   •  nitroglycerin (NITROSTAT) 0.4 MG SL tablet, Place 1 tablet under the tongue Every 5 (Five) Minutes As Needed for Chest Pain. Take no more than 3 doses in 15 minutes., Disp: 30 tablet, Rfl: 5  •  oxyCODONE-acetaminophen (Percocet)  MG per tablet, Take 1 tablet by mouth Every 6 (Six) Hours As Needed for Moderate Pain ., Disp: 120 tablet, Rfl: 0  •  pantoprazole (PROTONIX) 40 MG EC tablet, Take 40 mg by mouth Daily., Disp: , Rfl:   •  potassium chloride (K-DUR,KLOR-CON) 20 MEQ CR tablet, Take 1 tablet by mouth Daily., Disp: 90 tablet, Rfl: 3  •  pregabalin (LYRICA) 100 MG capsule, Take 100 mg by mouth 2 (Two) Times a Day., Disp: , Rfl:   •  QUEtiapine (SEROquel) 100 MG tablet, TAKE ONE TABLET BY MOUTH AT BEDTIME, Disp: 30 tablet, Rfl: 1  •  SITagliptin-metFORMIN HCl ER (JANUMET XR)  MG tablet, Take 2 tablets by mouth Daily., Disp: , Rfl:   •  venlafaxine XR (EFFEXOR-XR) 150 MG 24 hr capsule, Take 2 capsules by mouth Daily., Disp: 60 capsule, Rfl: 1    Allergies   Allergen Reactions   • Adhesive Tape Rash     Paper tape causes the rash   • Contrast Dye GI Intolerance   • Iodinated Diagnostic Agents Nausea Only   • Latex Rash   • Penicillins Hives   • Statins Rash     BAD LEG CRAMPS     • Sulfa Antibiotics Hives   • Morphine Rash       Family History   Problem Relation Age of Onset   • Cancer Mother         Other Cancer   • Heart disease Mother    • Hypertension Mother    • Stroke Mother    • Hypertension Father    • Kidney disease Father    • Diabetes Paternal Uncle    • Cancer Paternal Uncle         Colon Cancer       Social History     Tobacco Use   • Smoking status: Current Every Day Smoker     Packs/day: 1.00   • Smokeless tobacco: Never  "Used   • Tobacco comment: Passive Smoke: N   Substance Use Topics   • Alcohol use: No     Frequency: Never           Current Electrocardiogram:  Procedures        EMR Dragon/Transcription:   \"Dictated utilizing Dragon dictation\".         "

## 2020-09-22 ENCOUNTER — TELEPHONE (OUTPATIENT)
Dept: PSYCHIATRY | Facility: CLINIC | Age: 65
End: 2020-09-22

## 2020-09-28 RX ORDER — QUETIAPINE FUMARATE 400 MG/1
400 TABLET, FILM COATED ORAL NIGHTLY
Qty: 30 TABLET | Refills: 0 | Status: SHIPPED | OUTPATIENT
Start: 2020-09-28 | End: 2020-11-02

## 2020-10-05 RX ORDER — QUETIAPINE FUMARATE 100 MG/1
TABLET, FILM COATED ORAL
Qty: 30 TABLET | Refills: 0 | OUTPATIENT
Start: 2020-10-05

## 2020-10-08 ENCOUNTER — OFFICE (AMBULATORY)
Dept: URBAN - METROPOLITAN AREA CLINIC 64 | Facility: CLINIC | Age: 65
End: 2020-10-08

## 2020-10-08 VITALS
HEIGHT: 63 IN | WEIGHT: 192 LBS | DIASTOLIC BLOOD PRESSURE: 72 MMHG | SYSTOLIC BLOOD PRESSURE: 132 MMHG | HEART RATE: 94 BPM

## 2020-10-08 DIAGNOSIS — R14.0 ABDOMINAL DISTENSION (GASEOUS): ICD-10-CM

## 2020-10-08 DIAGNOSIS — K59.1 FUNCTIONAL DIARRHEA: ICD-10-CM

## 2020-10-08 DIAGNOSIS — R10.31 RIGHT LOWER QUADRANT PAIN: ICD-10-CM

## 2020-10-08 DIAGNOSIS — R13.10 DYSPHAGIA, UNSPECIFIED: ICD-10-CM

## 2020-10-08 PROCEDURE — 99214 OFFICE O/P EST MOD 30 MIN: CPT | Performed by: NURSE PRACTITIONER

## 2020-10-08 RX ORDER — NALOXEGOL OXALATE 12.5 MG/1
12.5 TABLET, FILM COATED ORAL
Qty: 30 | Refills: 5 | Status: COMPLETED
Start: 2020-10-08 | End: 2021-10-27

## 2020-10-08 RX ORDER — HYOSCYAMINE SULFATE EXTENDED-RELEASE 0.38 MG/1
0.75 TABLET ORAL
Qty: 60 | Refills: 5 | Status: COMPLETED
Start: 2020-10-08 | End: 2021-02-05

## 2020-10-08 RX ORDER — IMIPRAMINE HYDROCHLORIDE 10 MG/1
10 TABLET, FILM COATED ORAL
Qty: 30 | Refills: 5 | Status: COMPLETED
Start: 2020-10-08 | End: 2021-10-27

## 2020-10-08 RX ORDER — METHYLNALTREXONE BROMIDE 150 MG/1
450 TABLET ORAL
Qty: 90 | Refills: 11 | Status: COMPLETED
Start: 2020-06-25 | End: 2020-10-08

## 2020-10-26 ENCOUNTER — TELEPHONE (OUTPATIENT)
Dept: PAIN MEDICINE | Facility: HOSPITAL | Age: 65
End: 2020-10-26

## 2020-10-26 RX ORDER — HYDROCODONE BITARTRATE AND ACETAMINOPHEN 10; 325 MG/1; MG/1
1 TABLET ORAL EVERY 6 HOURS PRN
Qty: 120 TABLET | Refills: 0 | Status: SHIPPED | OUTPATIENT
Start: 2020-10-26 | End: 2020-11-20

## 2020-11-02 DIAGNOSIS — F34.1 DYSTHYMIA: ICD-10-CM

## 2020-11-02 RX ORDER — QUETIAPINE FUMARATE 400 MG/1
TABLET, FILM COATED ORAL
Qty: 30 TABLET | Refills: 2 | Status: SHIPPED | OUTPATIENT
Start: 2020-11-02 | End: 2021-04-27

## 2020-11-02 RX ORDER — VENLAFAXINE HYDROCHLORIDE 150 MG/1
CAPSULE, EXTENDED RELEASE ORAL
Qty: 60 CAPSULE | Refills: 1 | Status: SHIPPED | OUTPATIENT
Start: 2020-11-02 | End: 2020-12-30

## 2020-11-02 RX ORDER — AMITRIPTYLINE HYDROCHLORIDE 50 MG/1
TABLET, FILM COATED ORAL
Qty: 30 TABLET | Refills: 2 | Status: SHIPPED | OUTPATIENT
Start: 2020-11-02 | End: 2020-12-30 | Stop reason: ALTCHOICE

## 2020-11-02 RX ORDER — BUPROPION HYDROCHLORIDE 300 MG/1
300 TABLET ORAL EVERY MORNING
Qty: 30 TABLET | Refills: 2 | Status: SHIPPED | OUTPATIENT
Start: 2020-11-02 | End: 2021-04-27 | Stop reason: SDUPTHER

## 2020-11-02 RX ORDER — ARIPIPRAZOLE 15 MG/1
TABLET ORAL
Qty: 30 TABLET | Refills: 2 | Status: SHIPPED | OUTPATIENT
Start: 2020-11-02 | End: 2021-04-27 | Stop reason: SDUPTHER

## 2020-11-03 ENCOUNTER — APPOINTMENT (OUTPATIENT)
Dept: PAIN MEDICINE | Facility: CLINIC | Age: 65
End: 2020-11-03

## 2020-11-20 ENCOUNTER — RESULTS ENCOUNTER (OUTPATIENT)
Dept: PAIN MEDICINE | Facility: CLINIC | Age: 65
End: 2020-11-20

## 2020-11-20 ENCOUNTER — OFFICE VISIT (OUTPATIENT)
Dept: PAIN MEDICINE | Facility: CLINIC | Age: 65
End: 2020-11-20

## 2020-11-20 VITALS
BODY MASS INDEX: 31.58 KG/M2 | HEART RATE: 78 BPM | HEIGHT: 64 IN | DIASTOLIC BLOOD PRESSURE: 76 MMHG | WEIGHT: 185 LBS | TEMPERATURE: 97.1 F | SYSTOLIC BLOOD PRESSURE: 124 MMHG | OXYGEN SATURATION: 97 % | RESPIRATION RATE: 16 BRPM

## 2020-11-20 DIAGNOSIS — G89.29 CHRONIC MIDLINE LOW BACK PAIN WITH SCIATICA, SCIATICA LATERALITY UNSPECIFIED: ICD-10-CM

## 2020-11-20 DIAGNOSIS — M79.7 FIBROMYOSITIS: ICD-10-CM

## 2020-11-20 DIAGNOSIS — F19.90 CURRENT DRUG USE: ICD-10-CM

## 2020-11-20 DIAGNOSIS — R10.33 PERIUMBILICAL ABDOMINAL PAIN: ICD-10-CM

## 2020-11-20 DIAGNOSIS — M25.562 ARTHRALGIA OF LEFT KNEE: ICD-10-CM

## 2020-11-20 DIAGNOSIS — M54.2 NECK PAIN: ICD-10-CM

## 2020-11-20 DIAGNOSIS — M47.816 LUMBAR SPONDYLOSIS: ICD-10-CM

## 2020-11-20 DIAGNOSIS — M54.40 LUMBAGO OF LUMBAR REGION WITH SCIATICA: ICD-10-CM

## 2020-11-20 DIAGNOSIS — M48.061 SPINAL STENOSIS OF LUMBAR REGION, UNSPECIFIED WHETHER NEUROGENIC CLAUDICATION PRESENT: ICD-10-CM

## 2020-11-20 DIAGNOSIS — K59.03 CONSTIPATION DUE TO PAIN MEDICATION: ICD-10-CM

## 2020-11-20 DIAGNOSIS — Z79.899 OTHER LONG TERM (CURRENT) DRUG THERAPY: ICD-10-CM

## 2020-11-20 DIAGNOSIS — M54.16 LUMBAR RADICULOPATHY: ICD-10-CM

## 2020-11-20 DIAGNOSIS — M54.12 CERVICAL RADICULOPATHY: ICD-10-CM

## 2020-11-20 DIAGNOSIS — F19.90 CURRENT DRUG USE: Primary | ICD-10-CM

## 2020-11-20 DIAGNOSIS — M79.18 MYOFASCIAL MUSCLE PAIN: ICD-10-CM

## 2020-11-20 DIAGNOSIS — M79.605 LEG PAIN, BILATERAL: ICD-10-CM

## 2020-11-20 DIAGNOSIS — M79.604 LEG PAIN, BILATERAL: ICD-10-CM

## 2020-11-20 DIAGNOSIS — M54.40 CHRONIC MIDLINE LOW BACK PAIN WITH SCIATICA, SCIATICA LATERALITY UNSPECIFIED: ICD-10-CM

## 2020-11-20 DIAGNOSIS — M46.96 LUMBAR SPONDYLITIS (HCC): ICD-10-CM

## 2020-11-20 PROCEDURE — 99214 OFFICE O/P EST MOD 30 MIN: CPT | Performed by: PHYSICAL MEDICINE & REHABILITATION

## 2020-11-20 PROCEDURE — G0463 HOSPITAL OUTPT CLINIC VISIT: HCPCS | Performed by: PHYSICAL MEDICINE & REHABILITATION

## 2020-11-20 RX ORDER — THEOPHYLLINE 300 MG/1
300 TABLET, EXTENDED RELEASE ORAL DAILY
COMMUNITY
Start: 2020-11-02

## 2020-11-20 RX ORDER — METOPROLOL SUCCINATE 25 MG/1
TABLET, EXTENDED RELEASE ORAL
COMMUNITY
Start: 2020-10-05 | End: 2021-08-12 | Stop reason: ALTCHOICE

## 2020-11-20 RX ORDER — DIPHENOXYLATE HYDROCHLORIDE AND ATROPINE SULFATE 2.5; .025 MG/1; MG/1
1-2 TABLET ORAL
COMMUNITY
Start: 2020-11-19 | End: 2020-11-26

## 2020-11-20 RX ORDER — OXYCODONE AND ACETAMINOPHEN 10; 325 MG/1; MG/1
1 TABLET ORAL EVERY 6 HOURS PRN
Qty: 120 TABLET | Refills: 0 | Status: SHIPPED | OUTPATIENT
Start: 2020-11-20 | End: 2021-02-26 | Stop reason: SDUPTHER

## 2020-11-20 RX ORDER — CONJUGATED ESTROGENS 0.62 MG/G
CREAM VAGINAL
COMMUNITY
Start: 2020-11-10 | End: 2021-02-26 | Stop reason: SDUPTHER

## 2020-11-20 RX ORDER — BLOOD-GLUCOSE CONTROL, NORMAL
EACH MISCELLANEOUS
COMMUNITY
Start: 2020-11-11

## 2020-11-20 RX ORDER — OXYCODONE AND ACETAMINOPHEN 10; 325 MG/1; MG/1
1 TABLET ORAL EVERY 6 HOURS PRN
Qty: 120 TABLET | Refills: 0 | Status: SHIPPED | OUTPATIENT
Start: 2020-11-20 | End: 2020-11-20 | Stop reason: SDUPTHER

## 2020-11-20 RX ORDER — NALOXEGOL OXALATE 12.5 MG/1
TABLET, FILM COATED ORAL
COMMUNITY
Start: 2020-10-08 | End: 2022-06-09

## 2020-11-20 RX ORDER — PROMETHAZINE HYDROCHLORIDE 25 MG/1
25 TABLET ORAL EVERY 6 HOURS PRN
COMMUNITY
Start: 2020-11-05

## 2020-11-20 RX ORDER — PEN NEEDLE, DIABETIC 32GX 5/32"
NEEDLE, DISPOSABLE MISCELLANEOUS
COMMUNITY
Start: 2020-09-29

## 2020-11-20 RX ORDER — LANCING DEVICE/LANCETS
KIT MISCELLANEOUS
COMMUNITY
Start: 2020-11-11

## 2020-11-20 RX ORDER — BLOOD SUGAR DIAGNOSTIC
STRIP MISCELLANEOUS
COMMUNITY
Start: 2020-11-11

## 2020-11-20 RX ORDER — LISINOPRIL 2.5 MG/1
2.5 TABLET ORAL DAILY
COMMUNITY
Start: 2020-10-15 | End: 2022-12-05 | Stop reason: ALTCHOICE

## 2020-11-20 NOTE — PROGRESS NOTES
Subjective   Marlena Avilez is a 65 y.o. female.     Low back and yen. legs pain,   Lumbar Back Pain with BLLE Pain,  RLE Numbness and RLE Weakness,  Lumbar Back Pain is increased with prolonged sitting.  Patient planned Lumbar Epidural #1 with Dr. Pleitez, reports very little relief with LESIs. Pain is 9/10 at best, aching, throbbing, worse with bending and standing, interferes with ADLs, activity, failed injections, meds. MRI L-spine with laminectomy and fusion. Taking Zohydro 15mg BID with Dr. Pleitez with poor relief, Norco 10mg TID with PCP with poor relief, had good relief in past with Percocet 10mg TID prn, restarted, but less relief than Hydrocodone, worsening pain. Current patient of this clinic. Rotated to Norco 10mg QID prn, working well initially but pain worsening, rotated to MS-Contin 15mg TID. C/o constipation controlled by fiber supplement.       The following portions of the patient's history were reviewed and updated as appropriate: allergies, current medications, past family history, past medical history, past social history, past surgical history and problem list.    Review of Systems   Constitutional: Negative for chills, fatigue and fever.   HENT: Positive for hearing loss. Negative for trouble swallowing.    Eyes: Positive for visual disturbance.   Respiratory: Positive for shortness of breath.    Cardiovascular: Negative for chest pain.   Gastrointestinal: Negative for abdominal pain, constipation, diarrhea, nausea and vomiting.   Genitourinary: Negative for urinary incontinence.   Musculoskeletal: Negative for arthralgias, back pain, joint swelling, myalgias and neck pain.   Neurological: Positive for headache. Negative for dizziness, weakness and numbness.       Objective   Physical Exam   Constitutional: She is oriented to person, place, and time. She appears well-developed and well-nourished.   HENT:   Head: Normocephalic and atraumatic.   Eyes: Pupils are equal, round, and reactive to light.    Neck: Normal range of motion.   Cardiovascular: Normal rate, regular rhythm and normal heart sounds.   Pulmonary/Chest: Breath sounds normal.   Abdominal: Soft. Bowel sounds are normal. She exhibits no distension. There is no abdominal tenderness.   Musculoskeletal:      Comments: Strength 4/5 globally   Neurological: She is alert and oriented to person, place, and time. She has normal reflexes. She displays normal reflexes. No sensory deficit.   Psychiatric: Her behavior is normal. Thought content normal.         Assessment/Plan   Diagnoses and all orders for this visit:    1. Chronic midline low back pain with sciatica, sciatica laterality unspecified (Primary)    2. Lumbago of lumbar region with sciatica    3. Lumbar radiculopathy    4. Myofascial muscle pain    5. Neck pain    6. Periumbilical abdominal pain    7. Leg pain, bilateral    8. Cervical radiculopathy    9. Constipation due to pain medication    10. Lumbar spondylitis (CMS/HCC)    11. Lumbar spondylosis    12. Fibromyositis    13. Arthralgia of left knee    14. Other long term (current) drug therapy    15. Spinal stenosis of lumbar region, unspecified whether neurogenic claudication present        Inspect reviewed, in order. UDS 12/30/19 in order.  Stopped Zohydro, Norco. Failed Percocet 10mg TID prn which had worked well in past   Began Norco 10mg QID prn, was working well, tolerant. Completely failed MS-Contin 15mg TID, denies allergy to morphine. Restarted Percocet, then Norco per request, poor relief.  Restart Percocet 10mg QID prn, best relief so far.  Cont Zanaflex 2mg qHS prn.  Failed fiber supplement, OTC Colace, Miralax. Began Amitiza 24mcg BID for worsening OIC.  RTC 3 months for f/u.

## 2020-11-24 DIAGNOSIS — F51.05 INSOMNIA DUE TO MENTAL DISORDER: ICD-10-CM

## 2020-11-24 DIAGNOSIS — F41.1 GENERALIZED ANXIETY DISORDER: ICD-10-CM

## 2020-11-24 RX ORDER — ALPRAZOLAM 0.5 MG/1
TABLET ORAL
Qty: 60 TABLET | Refills: 2 | Status: SHIPPED | OUTPATIENT
Start: 2020-11-24 | End: 2021-04-01

## 2020-12-08 ENCOUNTER — TELEPHONE (OUTPATIENT)
Dept: PSYCHIATRY | Facility: CLINIC | Age: 65
End: 2020-12-08

## 2020-12-08 NOTE — TELEPHONE ENCOUNTER
Received call from Aleah at [facility]. They had received a trazadone rx for Ms Avilez written by Dottie Nunez.  Aleah called their office to confirm they are aware of the other medications the pt is taking. April was not. Their office then called Ms Avilez, she told Dottie Nunez's office she was only taking Effexor and Xanax prescribed by Erin.  Please Advise.

## 2020-12-08 NOTE — TELEPHONE ENCOUNTER
I would prefer that I manage all of her depression and sleep meds. Please let April know and, Fernanda, please call patient to go over med list and see why she stopped taking meds and why April is adding Trazodone

## 2020-12-09 ENCOUNTER — OFFICE (AMBULATORY)
Dept: URBAN - METROPOLITAN AREA CLINIC 64 | Facility: CLINIC | Age: 65
End: 2020-12-09

## 2020-12-09 VITALS
WEIGHT: 189 LBS | HEART RATE: 79 BPM | DIASTOLIC BLOOD PRESSURE: 77 MMHG | HEIGHT: 63 IN | SYSTOLIC BLOOD PRESSURE: 122 MMHG

## 2020-12-09 DIAGNOSIS — K59.00 CONSTIPATION, UNSPECIFIED: ICD-10-CM

## 2020-12-09 DIAGNOSIS — K21.9 GASTRO-ESOPHAGEAL REFLUX DISEASE WITHOUT ESOPHAGITIS: ICD-10-CM

## 2020-12-09 DIAGNOSIS — R13.10 DYSPHAGIA, UNSPECIFIED: ICD-10-CM

## 2020-12-09 DIAGNOSIS — R10.30 LOWER ABDOMINAL PAIN, UNSPECIFIED: ICD-10-CM

## 2020-12-09 PROCEDURE — 99213 OFFICE O/P EST LOW 20 MIN: CPT | Performed by: NURSE PRACTITIONER

## 2020-12-09 RX ORDER — TRAZODONE HYDROCHLORIDE 50 MG/1
TABLET ORAL
COMMUNITY
Start: 2020-12-07 | End: 2021-02-26 | Stop reason: ALTCHOICE

## 2020-12-09 RX ORDER — ROPINIROLE 0.5 MG/1
TABLET, FILM COATED ORAL
COMMUNITY
Start: 2020-11-30 | End: 2022-02-10

## 2020-12-09 RX ORDER — HYOSCYAMINE SULFATE EXTENDED-RELEASE 0.38 MG/1
0.75 TABLET ORAL
Qty: 60 | Refills: 11 | Status: COMPLETED
Start: 2020-12-09 | End: 2021-10-27

## 2020-12-09 RX ORDER — MONTELUKAST SODIUM 10 MG/1
10 TABLET ORAL NIGHTLY
COMMUNITY
Start: 2020-11-30

## 2020-12-14 NOTE — TELEPHONE ENCOUNTER
Medication list has been reviewed with Marlena. She stated April only refilled it for her. She is reporting that The trazodone does not help her sleep. She states she is not sleeping very well at all. Please advise

## 2020-12-30 DIAGNOSIS — F34.1 DYSTHYMIA: ICD-10-CM

## 2020-12-30 RX ORDER — VENLAFAXINE HYDROCHLORIDE 150 MG/1
CAPSULE, EXTENDED RELEASE ORAL
Qty: 60 CAPSULE | Refills: 2 | Status: SHIPPED | OUTPATIENT
Start: 2020-12-30 | End: 2021-03-25

## 2020-12-30 RX ORDER — ISOSORBIDE MONONITRATE 30 MG/1
TABLET, EXTENDED RELEASE ORAL
Qty: 30 TABLET | Refills: 0 | Status: SHIPPED | OUTPATIENT
Start: 2020-12-30 | End: 2021-01-27

## 2021-01-15 ENCOUNTER — TELEPHONE (OUTPATIENT)
Dept: PSYCHIATRY | Facility: CLINIC | Age: 66
End: 2021-01-15

## 2021-01-15 NOTE — TELEPHONE ENCOUNTER
Marlena is having surgery next week on Tuesday. She is on Xanax for anxiety. She is wanting something to help calm her down until the procedure.  I let her know that due to the time of ay she called I may not have anything to tell her until Monday.

## 2021-01-18 NOTE — TELEPHONE ENCOUNTER
She needs to contact the surgeon's office to ask them and see if they can pre-medicate her.  Since I do not know what procedure they are doing or what medications they plan to give her, I do not want to add anything that might interfere with or compromise her surgery.

## 2021-01-27 RX ORDER — ISOSORBIDE MONONITRATE 30 MG/1
TABLET, EXTENDED RELEASE ORAL
Qty: 90 TABLET | Refills: 0 | Status: SHIPPED | OUTPATIENT
Start: 2021-01-27 | End: 2021-02-19

## 2021-02-05 ENCOUNTER — OFFICE (AMBULATORY)
Dept: URBAN - METROPOLITAN AREA CLINIC 64 | Facility: CLINIC | Age: 66
End: 2021-02-05

## 2021-02-05 VITALS
DIASTOLIC BLOOD PRESSURE: 52 MMHG | WEIGHT: 184 LBS | HEIGHT: 63 IN | SYSTOLIC BLOOD PRESSURE: 100 MMHG | HEART RATE: 107 BPM

## 2021-02-05 DIAGNOSIS — R14.0 ABDOMINAL DISTENSION (GASEOUS): ICD-10-CM

## 2021-02-05 DIAGNOSIS — R13.10 DYSPHAGIA, UNSPECIFIED: ICD-10-CM

## 2021-02-05 DIAGNOSIS — R10.31 RIGHT LOWER QUADRANT PAIN: ICD-10-CM

## 2021-02-05 DIAGNOSIS — R74.8 ABNORMAL LEVELS OF OTHER SERUM ENZYMES: ICD-10-CM

## 2021-02-05 PROCEDURE — 99214 OFFICE O/P EST MOD 30 MIN: CPT | Performed by: NURSE PRACTITIONER

## 2021-02-19 RX ORDER — ISOSORBIDE MONONITRATE 30 MG/1
TABLET, EXTENDED RELEASE ORAL
Qty: 30 TABLET | Refills: 0 | Status: SHIPPED | OUTPATIENT
Start: 2021-02-19 | End: 2021-03-26

## 2021-02-25 ENCOUNTER — APPOINTMENT (OUTPATIENT)
Dept: PAIN MEDICINE | Facility: CLINIC | Age: 66
End: 2021-02-25

## 2021-02-25 ENCOUNTER — TELEPHONE (OUTPATIENT)
Dept: ORTHOPEDICS | Facility: OTHER | Age: 66
End: 2021-02-25

## 2021-02-25 NOTE — TELEPHONE ENCOUNTER
Provider: DR BEARD   Caller: MRS TREADWELL   Relationship to Patient: PATIENT   Phone Number: [phone]  Reason for Call: LATE CANCELLATION PATIENT RIDE CANCELED ON HER AT LAST MIN FOR APPT TODAY W/DR BEARD R/S HER FOR 230PM 2/26/21.

## 2021-02-26 ENCOUNTER — OFFICE VISIT (OUTPATIENT)
Dept: PAIN MEDICINE | Facility: CLINIC | Age: 66
End: 2021-02-26

## 2021-02-26 VITALS
TEMPERATURE: 97.3 F | RESPIRATION RATE: 16 BRPM | WEIGHT: 185 LBS | BODY MASS INDEX: 31.58 KG/M2 | HEIGHT: 64 IN | OXYGEN SATURATION: 98 % | SYSTOLIC BLOOD PRESSURE: 119 MMHG | HEART RATE: 79 BPM | DIASTOLIC BLOOD PRESSURE: 74 MMHG

## 2021-02-26 DIAGNOSIS — M46.96 LUMBAR SPONDYLITIS (HCC): ICD-10-CM

## 2021-02-26 DIAGNOSIS — K59.03 CONSTIPATION DUE TO PAIN MEDICATION: ICD-10-CM

## 2021-02-26 DIAGNOSIS — M79.18 MYOFASCIAL MUSCLE PAIN: ICD-10-CM

## 2021-02-26 DIAGNOSIS — G89.29 CHRONIC MIDLINE LOW BACK PAIN WITH SCIATICA, SCIATICA LATERALITY UNSPECIFIED: ICD-10-CM

## 2021-02-26 DIAGNOSIS — M54.40 CHRONIC MIDLINE LOW BACK PAIN WITH SCIATICA, SCIATICA LATERALITY UNSPECIFIED: ICD-10-CM

## 2021-02-26 DIAGNOSIS — M25.562 ARTHRALGIA OF LEFT KNEE: ICD-10-CM

## 2021-02-26 DIAGNOSIS — M54.12 CERVICAL RADICULOPATHY: ICD-10-CM

## 2021-02-26 DIAGNOSIS — M48.061 SPINAL STENOSIS OF LUMBAR REGION, UNSPECIFIED WHETHER NEUROGENIC CLAUDICATION PRESENT: ICD-10-CM

## 2021-02-26 DIAGNOSIS — M54.2 NECK PAIN: ICD-10-CM

## 2021-02-26 DIAGNOSIS — M47.816 LUMBAR SPONDYLOSIS: ICD-10-CM

## 2021-02-26 DIAGNOSIS — M54.16 LUMBAR RADICULOPATHY: ICD-10-CM

## 2021-02-26 DIAGNOSIS — Z79.899 OTHER LONG TERM (CURRENT) DRUG THERAPY: ICD-10-CM

## 2021-02-26 DIAGNOSIS — M54.40 LUMBAGO OF LUMBAR REGION WITH SCIATICA: Primary | ICD-10-CM

## 2021-02-26 PROCEDURE — 99214 OFFICE O/P EST MOD 30 MIN: CPT | Performed by: PHYSICAL MEDICINE & REHABILITATION

## 2021-02-26 RX ORDER — PEN NEEDLE, DIABETIC 31 GX5/16"
NEEDLE, DISPOSABLE MISCELLANEOUS
COMMUNITY
Start: 2021-02-04 | End: 2021-08-12

## 2021-02-26 RX ORDER — CONJUGATED ESTROGENS 0.62 MG/G
CREAM VAGINAL
COMMUNITY
Start: 2020-11-10 | End: 2021-08-12 | Stop reason: ALTCHOICE

## 2021-02-26 RX ORDER — DOXEPIN HYDROCHLORIDE 3 MG/1
3 TABLET ORAL
COMMUNITY
Start: 2021-02-25 | End: 2021-04-27

## 2021-02-26 RX ORDER — INSULIN LISPRO 100 [IU]/ML
4 INJECTION, SOLUTION INTRAVENOUS; SUBCUTANEOUS 3 TIMES DAILY
COMMUNITY
Start: 2020-12-08 | End: 2021-08-12 | Stop reason: ALTCHOICE

## 2021-02-26 RX ORDER — OXYCODONE AND ACETAMINOPHEN 10; 325 MG/1; MG/1
1 TABLET ORAL EVERY 6 HOURS PRN
Qty: 120 TABLET | Refills: 0 | Status: SHIPPED | OUTPATIENT
Start: 2021-02-26 | End: 2021-05-21 | Stop reason: SDUPTHER

## 2021-02-26 RX ORDER — LANCETS 33 GAUGE
EACH MISCELLANEOUS
COMMUNITY
Start: 2021-02-04 | End: 2021-08-12

## 2021-02-26 RX ORDER — INSULIN LISPRO 100 [IU]/ML
INJECTION, SOLUTION INTRAVENOUS; SUBCUTANEOUS
COMMUNITY
Start: 2020-12-08 | End: 2021-08-12

## 2021-02-26 RX ORDER — FUROSEMIDE 40 MG/1
40 TABLET ORAL 2 TIMES DAILY
COMMUNITY
Start: 2020-12-30

## 2021-02-26 RX ORDER — NITROFURANTOIN 25; 75 MG/1; MG/1
100 CAPSULE ORAL 2 TIMES DAILY
COMMUNITY
Start: 2021-02-15 | End: 2021-08-12

## 2021-02-26 RX ORDER — TIZANIDINE 4 MG/1
4 TABLET ORAL NIGHTLY PRN
Qty: 30 TABLET | Refills: 2 | Status: SHIPPED | OUTPATIENT
Start: 2021-02-26 | End: 2021-05-21 | Stop reason: SDUPTHER

## 2021-02-26 RX ORDER — CYCLOBENZAPRINE HCL 5 MG
TABLET ORAL
COMMUNITY
Start: 2021-01-04 | End: 2022-12-05

## 2021-02-26 NOTE — PROGRESS NOTES
Subjective   Marlena Avilez is a 66 y.o. female.     Low back and yen. legs pain,   Lumbar Back Pain with BLLE Pain,  RLE Numbness and RLE Weakness,  Lumbar Back Pain is increased with prolonged sitting.  Patient planned Lumbar Epidural #1 with Dr. Pleitez, reports very little relief with LESIs. Pain is 9/10 at best, aching, throbbing, worse with bending and standing, interferes with ADLs, activity, failed injections, meds. MRI L-spine with laminectomy and fusion. Taking Zohydro 15mg BID with Dr. Pleitez with poor relief, Norco 10mg TID with PCP with poor relief, had good relief in past with Percocet 10mg TID prn, restarted, but less relief than Hydrocodone, worsening pain. Current patient of this clinic. Rotated to Norco 10mg QID prn, working well initially but pain worsening, rotated to MS-Contin 15mg TID. C/o constipation controlled by fiber supplement.       The following portions of the patient's history were reviewed and updated as appropriate: allergies, current medications, past family history, past medical history, past social history, past surgical history and problem list.    Review of Systems   Constitutional: Negative for chills, fatigue and fever.   HENT: Positive for hearing loss. Negative for trouble swallowing.    Eyes: Positive for visual disturbance.   Respiratory: Positive for shortness of breath.    Cardiovascular: Negative for chest pain.   Gastrointestinal: Negative for abdominal pain, constipation, diarrhea, nausea and vomiting.   Genitourinary: Negative for urinary incontinence.   Musculoskeletal: Negative for arthralgias, back pain, joint swelling, myalgias and neck pain.   Neurological: Positive for headache. Negative for dizziness, weakness and numbness.       Objective   Physical Exam   Constitutional: She is oriented to person, place, and time. She appears well-developed and well-nourished.   HENT:   Head: Normocephalic and atraumatic.   Eyes: Pupils are equal, round, and reactive to light.    Neck: Normal range of motion.   Cardiovascular: Normal rate, regular rhythm and normal heart sounds.   Pulmonary/Chest: Breath sounds normal.   Abdominal: Soft. Bowel sounds are normal. She exhibits no distension. There is no abdominal tenderness.   Musculoskeletal:      Comments: Strength 4/5 globally   Neurological: She is alert and oriented to person, place, and time. She has normal reflexes. She displays normal reflexes. No sensory deficit.   Psychiatric: Her behavior is normal. Thought content normal.         Assessment/Plan   Diagnoses and all orders for this visit:    1. Lumbago of lumbar region with sciatica (Primary)    2. Lumbar radiculopathy    3. Lumbar spondylitis (CMS/HCC)    4. Lumbar spondylosis    5. Myofascial muscle pain    6. Neck pain    7. Other long term (current) drug therapy    8. Spinal stenosis of lumbar region, unspecified whether neurogenic claudication present    9. Arthralgia of left knee    10. Cervical radiculopathy    11. Constipation due to pain medication        Inspect reviewed, in order. UDS 11/20/20 in order.  Stopped Zohydro, Norco. Failed Percocet 10mg TID prn which had worked well in past   Began Norco 10mg QID prn, was working well, tolerant. Completely failed MS-Contin 15mg TID, denies allergy to morphine. Restarted Percocet, then Norco per request, poor relief.  Restarted Percocet 10mg QID prn, best relief so far, continue.  Increase to Zanaflex 4mg qHS prn for worsening muscle pain.  Failed fiber supplement, OTC Colace, Miralax. Began Amitiza 24mcg BID for worsening OIC.  RTC 3 months for f/u.

## 2021-03-24 DIAGNOSIS — F34.1 DYSTHYMIA: ICD-10-CM

## 2021-03-25 RX ORDER — VENLAFAXINE HYDROCHLORIDE 150 MG/1
CAPSULE, EXTENDED RELEASE ORAL
Qty: 60 CAPSULE | Refills: 1 | Status: SHIPPED | OUTPATIENT
Start: 2021-03-25 | End: 2021-04-27 | Stop reason: SDUPTHER

## 2021-03-26 RX ORDER — ISOSORBIDE MONONITRATE 30 MG/1
TABLET, EXTENDED RELEASE ORAL
Qty: 30 TABLET | Refills: 0 | Status: SHIPPED | OUTPATIENT
Start: 2021-03-26 | End: 2021-08-12 | Stop reason: ALTCHOICE

## 2021-04-01 DIAGNOSIS — F41.1 GENERALIZED ANXIETY DISORDER: ICD-10-CM

## 2021-04-01 DIAGNOSIS — F51.05 INSOMNIA DUE TO MENTAL DISORDER: ICD-10-CM

## 2021-04-01 RX ORDER — ALPRAZOLAM 0.5 MG/1
TABLET ORAL
Qty: 60 TABLET | Refills: 0 | Status: SHIPPED | OUTPATIENT
Start: 2021-04-01 | End: 2021-04-27

## 2021-04-05 ENCOUNTER — TELEPHONE (OUTPATIENT)
Dept: PAIN MEDICINE | Facility: CLINIC | Age: 66
End: 2021-04-05

## 2021-04-05 NOTE — TELEPHONE ENCOUNTER
4/5/21-- PT CALLED  HAD BACK SURGERY 2 WEEKS AGO  AT [facility]-- IS CURRENTLY ON  PERCOCET -- PT IS  IN A LOT OF PAIN--- WANTS TO KNOW IF YOU WILL GIVE HER LORTAB 10 INSTEAD-- HER BACK DOCTOR WILL NOT GIVE HER ANYTHING--PLEASE REVIEW AND ADVISE-- INSPECT IN CHART-- USES [facility]

## 2021-04-05 NOTE — TELEPHONE ENCOUNTER
If she is filling Percocet scripts from me, she will have to finish out that script before rotating. Thanks.

## 2021-04-14 RX ORDER — ARIPIPRAZOLE 5 MG/1
TABLET ORAL
COMMUNITY
Start: 2021-03-26 | End: 2021-04-27 | Stop reason: DRUGHIGH

## 2021-04-14 RX ORDER — HYDROXYZINE HYDROCHLORIDE 25 MG/1
TABLET, FILM COATED ORAL
COMMUNITY
Start: 2021-03-18 | End: 2021-08-12 | Stop reason: ALTCHOICE

## 2021-04-23 PROBLEM — Z01.810 PRE-OPERATIVE CARDIOVASCULAR EXAMINATION: Status: ACTIVE | Noted: 2021-02-08

## 2021-04-23 PROBLEM — M43.16 SPONDYLOLISTHESIS OF LUMBAR REGION: Status: ACTIVE | Noted: 2020-12-21

## 2021-04-23 PROBLEM — N18.30 CKD (CHRONIC KIDNEY DISEASE) STAGE 3, GFR 30-59 ML/MIN: Status: ACTIVE | Noted: 2021-04-23

## 2021-04-27 ENCOUNTER — OFFICE VISIT (OUTPATIENT)
Dept: PSYCHIATRY | Facility: CLINIC | Age: 66
End: 2021-04-27

## 2021-04-27 DIAGNOSIS — F34.1 DYSTHYMIA: Chronic | ICD-10-CM

## 2021-04-27 DIAGNOSIS — F51.05 INSOMNIA DUE TO MENTAL DISORDER: Primary | ICD-10-CM

## 2021-04-27 DIAGNOSIS — F41.1 GENERALIZED ANXIETY DISORDER: Chronic | ICD-10-CM

## 2021-04-27 PROCEDURE — 99214 OFFICE O/P EST MOD 30 MIN: CPT | Performed by: PHYSICIAN ASSISTANT

## 2021-04-27 RX ORDER — ESZOPICLONE 3 MG/1
3 TABLET, FILM COATED ORAL NIGHTLY PRN
Qty: 30 TABLET | Refills: 0 | Status: SHIPPED | OUTPATIENT
Start: 2021-04-27 | End: 2021-08-12 | Stop reason: ALTCHOICE

## 2021-04-27 RX ORDER — BUPROPION HYDROCHLORIDE 300 MG/1
300 TABLET ORAL EVERY MORNING
Qty: 30 TABLET | Refills: 3 | Status: SHIPPED | OUTPATIENT
Start: 2021-04-27 | End: 2021-08-12 | Stop reason: ALTCHOICE

## 2021-04-27 RX ORDER — ARIPIPRAZOLE 10 MG/1
10 TABLET ORAL DAILY
Qty: 30 TABLET | Refills: 3 | Status: SHIPPED | OUTPATIENT
Start: 2021-04-27 | End: 2021-08-12 | Stop reason: DRUGHIGH

## 2021-04-27 RX ORDER — VENLAFAXINE HYDROCHLORIDE 150 MG/1
300 CAPSULE, EXTENDED RELEASE ORAL DAILY
Qty: 60 CAPSULE | Refills: 3 | Status: SHIPPED | OUTPATIENT
Start: 2021-04-27 | End: 2021-09-07

## 2021-04-27 NOTE — PATIENT INSTRUCTIONS
Insomnia  Insomnia is a sleep disorder that makes it difficult to fall asleep or stay asleep. Insomnia can cause fatigue, low energy, difficulty concentrating, mood swings, and poor performance at work or school.  There are three different ways to classify insomnia:  · Difficulty falling asleep.  · Difficulty staying asleep.  · Waking up too early in the morning.  Any type of insomnia can be long-term (chronic) or short-term (acute). Both are common. Short-term insomnia usually lasts for three months or less. Chronic insomnia occurs at least three times a week for longer than three months.  What are the causes?  Insomnia may be caused by another condition, situation, or substance, such as:  · Anxiety.  · Certain medicines.  · Gastroesophageal reflux disease (GERD) or other gastrointestinal conditions.  · Asthma or other breathing conditions.  · Restless legs syndrome, sleep apnea, or other sleep disorders.  · Chronic pain.  · Menopause.  · Stroke.  · Abuse of alcohol, tobacco, or illegal drugs.  · Mental health conditions, such as depression.  · Caffeine.  · Neurological disorders, such as Alzheimer's disease.  · An overactive thyroid (hyperthyroidism).  Sometimes, the cause of insomnia may not be known.  What increases the risk?  Risk factors for insomnia include:  · Gender. Women are affected more often than men.  · Age. Insomnia is more common as you get older.  · Stress.  · Lack of exercise.  · Irregular work schedule or working night shifts.  · Traveling between different time zones.  · Certain medical and mental health conditions.  What are the signs or symptoms?  If you have insomnia, the main symptom is having trouble falling asleep or having trouble staying asleep. This may lead to other symptoms, such as:  · Feeling fatigued or having low energy.  · Feeling nervous about going to sleep.  · Not feeling rested in the morning.  · Having trouble concentrating.  · Feeling irritable, anxious, or depressed.  How  is this diagnosed?  This condition may be diagnosed based on:  · Your symptoms and medical history. Your health care provider may ask about:  ? Your sleep habits.  ? Any medical conditions you have.  ? Your mental health.  · A physical exam.  How is this treated?  Treatment for insomnia depends on the cause. Treatment may focus on treating an underlying condition that is causing insomnia. Treatment may also include:  · Medicines to help you sleep.  · Counseling or therapy.  · Lifestyle adjustments to help you sleep better.  Follow these instructions at home:  Eating and drinking    · Limit or avoid alcohol, caffeinated beverages, and cigarettes, especially close to bedtime. These can disrupt your sleep.  · Do not eat a large meal or eat spicy foods right before bedtime. This can lead to digestive discomfort that can make it hard for you to sleep.  Sleep habits    · Keep a sleep diary to help you and your health care provider figure out what could be causing your insomnia. Write down:  ? When you sleep.  ? When you wake up during the night.  ? How well you sleep.  ? How rested you feel the next day.  ? Any side effects of medicines you are taking.  ? What you eat and drink.  · Make your bedroom a dark, comfortable place where it is easy to fall asleep.  ? Put up shades or blackout curtains to block light from outside.  ? Use a white noise machine to block noise.  ? Keep the temperature cool.  · Limit screen use before bedtime. This includes:  ? Watching TV.  ? Using your smartphone, tablet, or computer.  · Stick to a routine that includes going to bed and waking up at the same times every day and night. This can help you fall asleep faster. Consider making a quiet activity, such as reading, part of your nighttime routine.  · Try to avoid taking naps during the day so that you sleep better at night.  · Get out of bed if you are still awake after 15 minutes of trying to sleep. Keep the lights down, but try reading or  doing a quiet activity. When you feel sleepy, go back to bed.  General instructions  · Take over-the-counter and prescription medicines only as told by your health care provider.  · Exercise regularly, as told by your health care provider. Avoid exercise starting several hours before bedtime.  · Use relaxation techniques to manage stress. Ask your health care provider to suggest some techniques that may work well for you. These may include:  ? Breathing exercises.  ? Routines to release muscle tension.  ? Visualizing peaceful scenes.  · Make sure that you drive carefully. Avoid driving if you feel very sleepy.  · Keep all follow-up visits as told by your health care provider. This is important.  Contact a health care provider if:  · You are tired throughout the day.  · You have trouble in your daily routine due to sleepiness.  · You continue to have sleep problems, or your sleep problems get worse.  Get help right away if:  · You have serious thoughts about hurting yourself or someone else.  If you ever feel like you may hurt yourself or others, or have thoughts about taking your own life, get help right away. You can go to your nearest emergency department or call:  · Your local emergency services (911 in the [place]  · A suicide crisis helpline, such as the National Suicide Prevention Lifeline at 1-507.450.5365. This is open 24 hours a day.  Summary  · Insomnia is a sleep disorder that makes it difficult to fall asleep or stay asleep.  · Insomnia can be long-term (chronic) or short-term (acute).  · Treatment for insomnia depends on the cause. Treatment may focus on treating an underlying condition that is causing insomnia.  · Keep a sleep diary to help you and your health care provider figure out what could be causing your insomnia.  This information is not intended to replace advice given to you by your health care provider. Make sure you discuss any questions you have with your health care provider.  Document  Revised: 11/30/2018 Document Reviewed: 09/27/2018  [employer] Patient Education © 2021 [employer]        Persistent Depressive Disorder, Adult  Persistent depressive disorder (PDD) is a mental health condition that causes symptoms of low-level depression for 2 years or longer. This disorder may also be called long-term (chronic) depression or dysthymia. PDD may include episodes of major depressive disorder (MDD), which is more severe depression that lasts for about 2 weeks.  PDD can affect the way you think, feel, and behave. This condition may also affect your relationships. You may be more likely to get sick if you have PDD.  What are the causes?  The exact cause of this condition is not known. PDD is most likely caused by a combination of things, which may include:  · Your personality traits.  · Your learned or conditioned behaviors, thoughts or feelings that reinforce negativity.  · Substance abuse or misuse.  · How you are able to manage chronic medical or mental health illness you may have.  · Going through a traumatic experience or major life changes.  What increases the risk?  The following factors may also make someone more likely to develop PDD:  · A family history of depression.  · Being a woman.  · Abnormally low levels of certain brain chemicals.  · Traumatic or painful events in childhood, especially abuse or the loss of a parent.  · Chronic stress caused by upsetting life experiences, troubled family relationships, or losses.  · Chronic physical illness or other mental health disorders.  · Cultural stress, such as stress from poor living conditions or discrimination.  What are the signs or symptoms?  Symptoms of this condition may occur for most of the day, and may include:  · Physical symptoms. These may include:  ? Tiredness or low energy.  ? Eating or sleeping too much or too little.  ? Being restless or agitated.  ? Unexplained physical complaints.  · Mental and emotional symptoms. These may  be:  ? Feeling hopeless, worthless, or guilty.  ? Anxiety.  ? Trouble focusing or making decisions.  ? Low self-esteem.  ? Negative outlook.  ? Feeling irritable.  ? Being unable to have fun or feel pleasure.  · Behavioral symptoms. These may include:  ? Withdrawing from others.  ? Aggressive behavior or anger.  How is this diagnosed?  This condition may be diagnosed based on:  · Your symptoms.  · Your medical history, including your mental health history. This may involve tests to evaluate your level of depression. You may be asked questions about your lifestyle, including any drug and alcohol use, and how long you have had symptoms of PDD.  · A physical exam.  · Blood tests to rule out other conditions.  PDD may be diagnosed if you have had the following symptoms:  · A depressed mood or loss of interest in things for 2 years or longer.  · Other symptoms of depression.  How is this treated?  This condition is usually treated by mental health professionals, such as psychologists, psychiatrists, and clinical social workers. You may need more than one type of treatment. Treatment may include:  · Psychotherapy, also called talk therapy or counseling. Types of psychotherapy include:  ? Cognitive behavioral therapy (CBT). This teaches you to recognize unhealthy feelings, thoughts, and behaviors, and to replace them with positive thoughts and actions.  ? Interpersonal therapy (IPT). This helps you to improve the way you relate to and communicate with others.  ? Family therapy. This treatment involves members of your family.  · Medicines to treat anxiety and depression, or to help balance chemicals in your brain that affect emotions and behaviors.  · Lifestyle changes, such as:  ? Limiting alcohol and drug use.  ? Getting regular exercise.  ? Developing a consistent sleep routine.  ? Making healthy eating choices.  ? Spending more time outdoors.  A combination of psychotherapy and medicines is thought to be the most  effective form of treatment.  Follow these instructions at home:  Activity    · Exercise regularly and spend time outdoors as told.  · Find activities that you enjoy doing, and make time to do them.  · Find healthy ways to manage stress, such as:  ? Meditation or deep breathing.  ? Spending time in nature.  ? Journaling.  · Return to your normal activities as told by your health care provider.  Alcohol and drug use  · If you drink alcohol:  ? Limit how much you use to:  § 0-1 drink a day for nonpregnant women.  § 0-2 drinks a day for men.  ? Be aware of how much alcohol is in your drink. In the [place] one drink equals one 12 oz bottle of beer (355 mL), one 5 oz glass of wine (148 mL), or one 1½ oz glass of hard liquor (44 mL).  ? Discuss your alcohol use with your health care provider. Alcohol can affect any antidepressant medicines you are taking.  · Discuss any drug use with your health care provider.  General instructions    · Take over-the-counter and prescription medicines and herbal preparations only as told by your health care provider.  · Eat a healthy diet and get plenty of sleep.  · Consider joining a support group. Your health care provider may be able to recommend one.  · Keep all follow-up visits as told by your health care provider. This is important.  Where to find more information  · National [place] on Mental Illness: www.la.org  · [place] of [place] Health: www.nimh.nih.gov  · American Psychiatric Association: www.psychiatry.org/patients-families  Contact a health care provider if:  · Your symptoms get worse.  · You develop new symptoms.  · You have trouble sleeping or doing your daily activities.  Get help right away if:  · You harm yourself.  · You have serious thoughts about hurting yourself or others.  · You see, hear, taste, smell, or feel things that are not real (hallucinate).  If you ever feel like you may hurt yourself or others, or have thoughts about taking your own  life, get help right away. Go to your nearest emergency department or:  · Call your local emergency services (911 in the [place]  · Call a suicide crisis helpline, such as the National Suicide Prevention Lifeline at 1-273.873.1355. This is open 24 hours a day in the [place]  · Text the Crisis Text Line at 014644 (in the [place]  Summary  · Persistent depressive disorder (PDD) is a mental health condition that causes symptoms of low-level depression for 2 years or longer.  · This condition is usually treated by mental health professionals. You may need more than one type of treatment. Treatment may involve psychotherapy, medicines, and lifestyle changes.  · Get help right away if you have serious thoughts about hurting yourself or others.  This information is not intended to replace advice given to you by your health care provider. Make sure you discuss any questions you have with your health care provider.  Document Revised: 11/28/2020 Document Reviewed: 11/28/2020  [employer] Patient Education © 2021 [employer]
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
## 2021-04-29 RX ORDER — OXYCODONE AND ACETAMINOPHEN 10; 325 MG/1; MG/1
TABLET ORAL
Qty: 120 TABLET | Refills: 0 | OUTPATIENT
Start: 2021-04-29

## 2021-05-02 ENCOUNTER — HOSPITAL ENCOUNTER (EMERGENCY)
Facility: HOSPITAL | Age: 66
Discharge: LEFT BEFORE DISCHARGE | End: 2021-05-03
Admitting: EMERGENCY MEDICINE

## 2021-05-02 ENCOUNTER — APPOINTMENT (OUTPATIENT)
Dept: CT IMAGING | Facility: HOSPITAL | Age: 66
End: 2021-05-02

## 2021-05-02 ENCOUNTER — APPOINTMENT (OUTPATIENT)
Dept: GENERAL RADIOLOGY | Facility: HOSPITAL | Age: 66
End: 2021-05-02

## 2021-05-02 VITALS
DIASTOLIC BLOOD PRESSURE: 63 MMHG | TEMPERATURE: 98.1 F | RESPIRATION RATE: 16 BRPM | SYSTOLIC BLOOD PRESSURE: 119 MMHG | BODY MASS INDEX: 29.66 KG/M2 | HEIGHT: 66 IN | WEIGHT: 184.53 LBS | HEART RATE: 77 BPM | OXYGEN SATURATION: 99 %

## 2021-05-02 DIAGNOSIS — R07.9 CHEST PAIN, UNSPECIFIED TYPE: Primary | ICD-10-CM

## 2021-05-02 LAB
ALBUMIN SERPL-MCNC: 4.1 G/DL (ref 3.5–5.2)
ALBUMIN/GLOB SERPL: 1.4 G/DL
ALP SERPL-CCNC: 153 U/L (ref 39–117)
ALT SERPL W P-5'-P-CCNC: 13 U/L (ref 1–33)
ANION GAP SERPL CALCULATED.3IONS-SCNC: 12 MMOL/L (ref 5–15)
APTT PPP: 31 SECONDS (ref 24–31)
AST SERPL-CCNC: 17 U/L (ref 1–32)
BASOPHILS # BLD AUTO: 0.1 10*3/MM3 (ref 0–0.2)
BASOPHILS NFR BLD AUTO: 1 % (ref 0–1.5)
BILIRUB SERPL-MCNC: <0.2 MG/DL (ref 0–1.2)
BUN SERPL-MCNC: 24 MG/DL (ref 8–23)
BUN/CREAT SERPL: 20.9 (ref 7–25)
CALCIUM SPEC-SCNC: 10.2 MG/DL (ref 8.6–10.5)
CHLORIDE SERPL-SCNC: 97 MMOL/L (ref 98–107)
CO2 SERPL-SCNC: 29 MMOL/L (ref 22–29)
CREAT SERPL-MCNC: 1.15 MG/DL (ref 0.57–1)
D DIMER PPP FEU-MCNC: 3.04 MG/L (FEU) (ref 0–0.59)
DEPRECATED RDW RBC AUTO: 41.1 FL (ref 37–54)
EOSINOPHIL # BLD AUTO: 0.2 10*3/MM3 (ref 0–0.4)
EOSINOPHIL NFR BLD AUTO: 3.2 % (ref 0.3–6.2)
ERYTHROCYTE [DISTWIDTH] IN BLOOD BY AUTOMATED COUNT: 13.3 % (ref 12.3–15.4)
GFR SERPL CREATININE-BSD FRML MDRD: 48 ML/MIN/1.73
GLOBULIN UR ELPH-MCNC: 3 GM/DL
GLUCOSE SERPL-MCNC: 99 MG/DL (ref 65–99)
HCT VFR BLD AUTO: 38.5 % (ref 34–46.6)
HGB BLD-MCNC: 13.3 G/DL (ref 12–15.9)
HOLD SPECIMEN: NORMAL
INR PPP: 0.93 (ref 0.93–1.1)
LYMPHOCYTES # BLD AUTO: 1.9 10*3/MM3 (ref 0.7–3.1)
LYMPHOCYTES NFR BLD AUTO: 33.3 % (ref 19.6–45.3)
MCH RBC QN AUTO: 30.5 PG (ref 26.6–33)
MCHC RBC AUTO-ENTMCNC: 34.7 G/DL (ref 31.5–35.7)
MCV RBC AUTO: 88 FL (ref 79–97)
MONOCYTES # BLD AUTO: 0.8 10*3/MM3 (ref 0.1–0.9)
MONOCYTES NFR BLD AUTO: 15.2 % (ref 5–12)
NEUTROPHILS NFR BLD AUTO: 2.7 10*3/MM3 (ref 1.7–7)
NEUTROPHILS NFR BLD AUTO: 47.3 % (ref 42.7–76)
NRBC BLD AUTO-RTO: 0.2 /100 WBC (ref 0–0.2)
NT-PROBNP SERPL-MCNC: 89.4 PG/ML (ref 0–900)
PLATELET # BLD AUTO: 195 10*3/MM3 (ref 140–450)
PMV BLD AUTO: 9.1 FL (ref 6–12)
POTASSIUM SERPL-SCNC: 4.5 MMOL/L (ref 3.5–5.2)
PROT SERPL-MCNC: 7.1 G/DL (ref 6–8.5)
PROTHROMBIN TIME: 10.3 SECONDS (ref 9.6–11.7)
RBC # BLD AUTO: 4.37 10*6/MM3 (ref 3.77–5.28)
SODIUM SERPL-SCNC: 138 MMOL/L (ref 136–145)
TROPONIN T SERPL-MCNC: <0.01 NG/ML (ref 0–0.03)
WBC # BLD AUTO: 5.6 10*3/MM3 (ref 3.4–10.8)

## 2021-05-02 PROCEDURE — 96375 TX/PRO/DX INJ NEW DRUG ADDON: CPT

## 2021-05-02 PROCEDURE — 93005 ELECTROCARDIOGRAM TRACING: CPT

## 2021-05-02 PROCEDURE — 84484 ASSAY OF TROPONIN QUANT: CPT | Performed by: PHYSICIAN ASSISTANT

## 2021-05-02 PROCEDURE — 99284 EMERGENCY DEPT VISIT MOD MDM: CPT

## 2021-05-02 PROCEDURE — 83880 ASSAY OF NATRIURETIC PEPTIDE: CPT | Performed by: PHYSICIAN ASSISTANT

## 2021-05-02 PROCEDURE — 25010000002 HYDROMORPHONE PER 4 MG: Performed by: PHYSICIAN ASSISTANT

## 2021-05-02 PROCEDURE — 85730 THROMBOPLASTIN TIME PARTIAL: CPT | Performed by: PHYSICIAN ASSISTANT

## 2021-05-02 PROCEDURE — 71045 X-RAY EXAM CHEST 1 VIEW: CPT

## 2021-05-02 PROCEDURE — 96374 THER/PROPH/DIAG INJ IV PUSH: CPT

## 2021-05-02 PROCEDURE — 85610 PROTHROMBIN TIME: CPT | Performed by: PHYSICIAN ASSISTANT

## 2021-05-02 PROCEDURE — 85379 FIBRIN DEGRADATION QUANT: CPT | Performed by: PHYSICIAN ASSISTANT

## 2021-05-02 PROCEDURE — 71275 CT ANGIOGRAPHY CHEST: CPT

## 2021-05-02 PROCEDURE — 80053 COMPREHEN METABOLIC PANEL: CPT | Performed by: PHYSICIAN ASSISTANT

## 2021-05-02 PROCEDURE — 0 IOPAMIDOL PER 1 ML: Performed by: PHYSICIAN ASSISTANT

## 2021-05-02 PROCEDURE — 25010000002 ONDANSETRON PER 1 MG: Performed by: PHYSICIAN ASSISTANT

## 2021-05-02 PROCEDURE — 85025 COMPLETE CBC W/AUTO DIFF WBC: CPT | Performed by: PHYSICIAN ASSISTANT

## 2021-05-02 RX ORDER — HYDROMORPHONE HCL 110MG/55ML
0.5 PATIENT CONTROLLED ANALGESIA SYRINGE INTRAVENOUS ONCE
Status: COMPLETED | OUTPATIENT
Start: 2021-05-02 | End: 2021-05-02

## 2021-05-02 RX ORDER — ONDANSETRON 2 MG/ML
4 INJECTION INTRAMUSCULAR; INTRAVENOUS ONCE
Status: COMPLETED | OUTPATIENT
Start: 2021-05-02 | End: 2021-05-02

## 2021-05-02 RX ORDER — ASPIRIN 81 MG/1
324 TABLET, CHEWABLE ORAL ONCE
Status: COMPLETED | OUTPATIENT
Start: 2021-05-02 | End: 2021-05-02

## 2021-05-02 RX ORDER — NITROGLYCERIN 0.4 MG/1
0.4 TABLET SUBLINGUAL
Status: DISCONTINUED | OUTPATIENT
Start: 2021-05-02 | End: 2021-05-03 | Stop reason: HOSPADM

## 2021-05-02 RX ORDER — SODIUM CHLORIDE 0.9 % (FLUSH) 0.9 %
10 SYRINGE (ML) INJECTION AS NEEDED
Status: DISCONTINUED | OUTPATIENT
Start: 2021-05-02 | End: 2021-05-03 | Stop reason: HOSPADM

## 2021-05-02 RX ADMIN — HYDROMORPHONE HYDROCHLORIDE 0.5 MG: 2 INJECTION, SOLUTION INTRAMUSCULAR; INTRAVENOUS; SUBCUTANEOUS at 22:20

## 2021-05-02 RX ADMIN — ASPIRIN 324 MG: 81 TABLET, CHEWABLE ORAL at 21:28

## 2021-05-02 RX ADMIN — ONDANSETRON 4 MG: 2 INJECTION INTRAMUSCULAR; INTRAVENOUS at 22:20

## 2021-05-02 RX ADMIN — IOPAMIDOL 100 ML: 755 INJECTION, SOLUTION INTRAVENOUS at 22:47

## 2021-05-02 RX ADMIN — SODIUM CHLORIDE 1000 ML: 9 INJECTION, SOLUTION INTRAVENOUS at 22:20

## 2021-05-02 RX ADMIN — NITROGLYCERIN 0.4 MG: 0.4 TABLET SUBLINGUAL at 21:28

## 2021-05-03 LAB — TROPONIN T SERPL-MCNC: <0.01 NG/ML (ref 0–0.03)

## 2021-05-03 NOTE — ED NOTES
Pt comes in with chest pain since last Thursday. Worsening today, vitals ar stable.      Nitesh Guerrero, RN  05/02/21 2123

## 2021-05-03 NOTE — DISCHARGE INSTRUCTIONS
Take nitro as needed for your chest pain.  Follow-up with your cardiologist or Dr. Elkins as listed below for further evaluation and management of your chest pain.    Follow-up with your primary care provider in 3-5 days.  If you do not have a primary care provider call [phone] for help in finding one, or you may follow up with [facility] at [phone].    Return to ED for any new or worsening symptoms

## 2021-05-03 NOTE — ED NOTES
Pt states that she wants to leave AMA, pt verbalized understanding of potential risks of leaving.      Lulu Fritz, KARTHIK  05/03/21 0025

## 2021-05-03 NOTE — ED PROVIDER NOTES
Subjective   Patient is a 66-year-old female who presents with complaints of chest heaviness intermittently over the past 2 days.  She currently rates it as mild in severity states it does radiate down her left arms at times.  She reports some increased shortness of breath and associated nausea.  She denies any significant change in her chest pain with exertion.  She reports chronic cough secondary to smoking denies any significant change or hemoptysis.  Patient also denies any vomiting abdominal pain or diarrhea heart palpitations lower extremity edema recent travel or known sick contacts.  No headaches or lightheadedness.  Patient states she did take 1 sublingual nitroglycerin around 6:30 PM which did seem to help some with her pain.  Patient states she has had a cardiac cath done but it has been over 10 years ago states she did not have any stents placed.  Patient states she did have back surgery about 5 weeks ago reports back pain but states it is improving daily.  No saddle anesthesia or bladder incontinence.          Review of Systems   Constitutional: Negative.    HENT: Negative.    Eyes: Negative for photophobia and visual disturbance.   Respiratory: Positive for cough and shortness of breath. Negative for apnea, choking, chest tightness, wheezing and stridor.    Cardiovascular: Positive for chest pain. Negative for palpitations and leg swelling.   Gastrointestinal: Positive for nausea. Negative for abdominal distention, abdominal pain, constipation, diarrhea and vomiting.   Genitourinary: Negative.    Musculoskeletal: Positive for back pain. Negative for neck pain and neck stiffness.   Skin: Negative.    Neurological: Negative.    Hematological: Negative.        Past Medical History:   Diagnosis Date   • Anemia    • Arthritis    • Atrial fibrillation (CMS/HCC)     Proxysmal   • Chronic kidney disease    • COPD (chronic obstructive pulmonary disease) (CMS/HCC)    • Dark stools    • Depression    • Disease of  thyroid gland    • GERD (gastroesophageal reflux disease)    • History of hernia repair    • Hyperlipidemia    • Hypertension    • IBS (irritable bowel syndrome)    • Liver disease    • Low back pain    • Obesity    • Seizure disorder (CMS/HCC)    • Type 2 diabetes mellitus (CMS/HCC)    • Weight gain        Allergies   Allergen Reactions   • Adhesive Tape Rash     Paper tape causes the rash   • Contrast Dye GI Intolerance   • Iodinated Diagnostic Agents Nausea Only   • Latex Rash   • Penicillins Hives   • Statins Rash     BAD LEG CRAMPS     • Sulfa Antibiotics Hives   • Morphine Rash       Past Surgical History:   Procedure Laterality Date   • BACK SURGERY     • CARDIAC CATHETERIZATION      Abstraction from [facility]: [facility]'s, 3-16   • CHOLECYSTECTOMY     • ENDOSCOPY  03/31/2015    Normal   • HERNIA REPAIR  01/19/2016    Dr Mota   • HYSTERECTOMY     • TONSILLECTOMY         Family History   Problem Relation Age of Onset   • Cancer Mother         Other Cancer   • Heart disease Mother    • Hypertension Mother    • Stroke Mother    • Hypertension Father    • Kidney disease Father    • Diabetes Paternal Uncle    • Cancer Paternal Uncle         Colon Cancer       Social History     Socioeconomic History   • Marital status: Single     Spouse name: Not on file   • Number of children: Not on file   • Years of education: Not on file   • Highest education level: Not on file   Tobacco Use   • Smoking status: Current Every Day Smoker     Packs/day: 1.00   • Smokeless tobacco: Never Used   • Tobacco comment: Passive Smoke: N   Vaping Use   • Vaping Use: Former   Substance and Sexual Activity   • Alcohol use: No   • Drug use: Defer   • Sexual activity: Defer           Objective   Physical Exam  Vitals and nursing note reviewed.   Constitutional:       General: She is not in acute distress.     Appearance: She is well-developed. She is not ill-appearing, toxic-appearing or diaphoretic.   HENT:      Head: Normocephalic  "and atraumatic.      Mouth/Throat:      Mouth: Mucous membranes are moist.      Pharynx: Oropharynx is clear.   Eyes:      General: No scleral icterus.     Extraocular Movements: Extraocular movements intact.      Pupils: Pupils are equal, round, and reactive to light.   Cardiovascular:      Rate and Rhythm: Normal rate and regular rhythm.      Pulses: Normal pulses.      Heart sounds: Normal heart sounds. No murmur heard.   No friction rub. No gallop.    Pulmonary:      Effort: Pulmonary effort is normal. No respiratory distress.      Breath sounds: No stridor. No decreased breath sounds, wheezing, rhonchi or rales.   Chest:      Chest wall: Tenderness present. No mass, deformity, crepitus or edema.   Abdominal:      General: Bowel sounds are normal. There is no distension. There are no signs of injury.      Palpations: Abdomen is soft. There is no fluid wave, hepatomegaly, splenomegaly or mass.      Tenderness: There is no abdominal tenderness. There is no right CVA tenderness, left CVA tenderness, guarding or rebound.      Hernia: No hernia is present.   Musculoskeletal:      Cervical back: Normal range of motion and neck supple.      Right lower leg: No edema.      Left lower leg: No edema.   Skin:     General: Skin is warm.      Capillary Refill: Capillary refill takes less than 2 seconds.      Coloration: Skin is not cyanotic, jaundiced or pale.      Findings: No rash.   Neurological:      General: No focal deficit present.      Mental Status: She is alert and oriented to person, place, and time.   Psychiatric:         Mood and Affect: Mood normal.         Behavior: Behavior normal.         Procedures           ED Course    /63   Pulse 77   Temp 98.1 °F (36.7 °C) (Oral)   Resp 16   Ht 167.6 cm (66\")   Wt 83.7 kg (184 lb 8.4 oz)   SpO2 99%   Breastfeeding No   BMI 29.78 kg/m²   5/2/2021   Medications   sodium chloride 0.9 % flush 10 mL (has no administration in time range)   nitroglycerin " (NITROSTAT) SL tablet 0.4 mg (0.4 mg Sublingual Given 5/2/21 2128)   aspirin chewable tablet 324 mg (324 mg Oral Given 5/2/21 2128)   sodium chloride 0.9 % bolus 1,000 mL (1,000 mL Intravenous New Bag 5/2/21 2220)   ondansetron (ZOFRAN) injection 4 mg (4 mg Intravenous Given 5/2/21 2220)   HYDROmorphone (DILAUDID) injection 0.5 mg (0.5 mg Intravenous Given 5/2/21 2220)   iopamidol (ISOVUE-370) 76 % injection 100 mL (100 mL Intravenous Given 5/2/21 2247)     Labs Reviewed   COMPREHENSIVE METABOLIC PANEL - Abnormal; Notable for the following components:       Result Value    BUN 24 (*)     Creatinine 1.15 (*)     Chloride 97 (*)     Alkaline Phosphatase 153 (*)     eGFR Non  Amer 48 (*)     All other components within normal limits    Narrative:     GFR Normal >60  Chronic Kidney Disease <60  Kidney Failure <15     D-DIMER, QUANTITATIVE - Abnormal; Notable for the following components:    D-Dimer, Quantitative 3.04 (*)     All other components within normal limits    Narrative:     Reference Range  --------------------------------------------------------------------     < 0.50   Negative Predictive Value  0.50-0.59   Indeterminate    >= 0.60   Probable VTE             A very low percentage of patients with DVT may yield D-Dimer results   below the cut-off of 0.50 mg/L FEU.  This is known to be more   prevalent in patients with distal DVT.             Results of this test should always be interpreted in conjunction with   the patient's medical history, clinical presentation and other   findings.  Clinical diagnosis should not be based on the result of   INNOVANCE D-Dimer alone.   CBC WITH AUTO DIFFERENTIAL - Abnormal; Notable for the following components:    Monocyte % 15.2 (*)     All other components within normal limits   TROPONIN (IN-HOUSE) - Normal    Narrative:     Troponin T Reference Range:  <= 0.03 ng/mL-   Negative for AMI  >0.03 ng/mL-     Abnormal for myocardial necrosis.  Clinicians would have to  utilize clinical acumen, EKG, Troponin and serial changes to determine if it is an Acute Myocardial Infarction or myocardial injury due to an underlying chronic condition.       Results may be falsely decreased if patient taking Biotin.     TROPONIN (IN-HOUSE) - Normal    Narrative:     Troponin T Reference Range:  <= 0.03 ng/mL-   Negative for AMI  >0.03 ng/mL-     Abnormal for myocardial necrosis.  Clinicians would have to utilize clinical acumen, EKG, Troponin and serial changes to determine if it is an Acute Myocardial Infarction or myocardial injury due to an underlying chronic condition.       Results may be falsely decreased if patient taking Biotin.     PROTIME-INR - Normal   APTT - Normal   BNP (IN-HOUSE) - Normal    Narrative:     Among patients with dyspnea, NT-proBNP is highly sensitive for the detection of acute congestive heart failure. In addition NT-proBNP of <300 pg/ml effectively rules out acute congestive heart failure with 99% negative predictive value.    Results may be falsely decreased if patient taking Biotin.     CBC AND DIFFERENTIAL    Narrative:     The following orders were created for panel order CBC & Differential.  Procedure                               Abnormality         Status                     ---------                               -----------         ------                     CBC Auto Differential[[id number]]        Abnormal            Final result                 Please view results for these tests on the individual orders.   EXTRA TUBES    Narrative:     The following orders were created for panel order Extra Tubes.  Procedure                               Abnormality         Status                     ---------                               -----------         ------                     Gold Top - SST[[id number]]                                   Final result                 Please view results for these tests on the individual orders.   GOLD TOP - SST     XR Chest 1  View    Result Date: 5/2/2021  1. Borderline cardiomegaly. 2. Elevation of the right hemidiaphragm. 3. Otherwise no acute intrathoracic process. Slot 63 Electronically signed by:  Aly Acuna M.D.  5/2/2021 9:05 PM                      HEART Score: 3                      MDM  Number of Diagnoses or Management Options  Chest pain, unspecified type  Diagnosis management comments: Chart Review:  Comorbidity: As per past medical history  ECG: Interpreted by myself and Dr. Lehman shows sinus rhythm rate 88 no previous to review  Labs: Serial troponin proBNP BNP within normal limits CBC unremarkable D-dimer elevated 3.04 CMP shows glucose 90 BUN 24 creatinine 1.15 sodium 130 potassium 4.5.  Old chart reviewed from 13 days ago showed BUN of 23 creatinine 1  Imaging: Was interpreted by physician and reviewed by myself:  XR Chest 1 View  Result Date: 5/2/2021  1. Borderline cardiomegaly. 2. Elevation of the right hemidiaphragm. 3. Otherwise no acute intrathoracic process. Slot 63 Electronically signed by:  Aly Acuna M.D.  5/2/2021 9:05 PM    Disposition/Treatment:  Appropriate PPE was worn during exam and throughout all encounters with the patient.  When the ED IV was placed and labs were obtained patient was given aspirin and nitroglycerin with minimal improvement of her pain her blood pressure did drop after the nitroglycerin she was given fluids and sent Dilaudid upon reassessment she is resting quietly does report improvement of her pain.  Her chest pain is reproducible with palpation.  EKG showed sinus rhythm no signs of acute STEMI troponin and BNP were within normal limits.  D-dimer elevated as above CT PE protocol was placed which showed no acute PE additional findings as above.  Otherwise lab results were fairly unremarkable no signs of severe infection or electrolyte abnormality.  Due to patient's comorbidities and not having a cardiac work-up in several years discussed admission for further cardiac  work-up.  At this time patient states that she cannot stay because she has a cat at home.  Patient has decided to leave our facility against medical advice. I have assessed the patient´s ability to make an informed decision and it is my opinion at this time that the patient has the medical decision-making capacity to comprehend information regarding current medical condition and appreciates the impact of the disease or condition and the consequences of various options for treatment, including foregoing treatment. The patient possesses the ability to evaluate all treatment options, compare the risks and benefits of each option, communicate choice in a consistent manner over time, and is able to make rational choices. I have explained to the patient the further testing, treatment, and evaluation I would like to perform during the current emergency department visit as well as any possible alternatives that could be accomplished in a timely manner. I have outlined the possible risks of forgoing any all of these interventions and the patient understands and acknowledges that the decision to leave may result in undesirable consequences such as death, permanent disability, and/or loss of current lifestyle. Even though leaving AMA is not ideal, I have instructed the patient to follow any discharge instructions given, take any medications prescribed, and resume care as soon as possible with any other provider. Additionally, we clearly stated that the patient is welcome to return it anytime to continue care at our facility.       Amount and/or Complexity of Data Reviewed  Clinical lab tests: reviewed  Tests in the radiology section of CPT®: reviewed  Tests in the medicine section of CPT®: reviewed        Final diagnoses:   Chest pain, unspecified type       ED Disposition  ED Disposition     ED Disposition Condition Comment    TIMOTEO Nunez April L, APRN  [address] [place]  [address] [phone]    Schedule an appointment as soon as possible for a visit in 3 days      [facility] EMERGENCY DEPARTMENT  [address] [facility] [address] [phone]  Go to   If symptoms worsen    Ludwig Elkins MD  [facility] [place] [address] [phone]    Call   If you do not have a cardiologist         Medication List      No changes were made to your prescriptions during this visit.          Brittany Huff PA  05/03/21 0016

## 2021-05-04 LAB — QT INTERVAL: 374 MS

## 2021-05-20 ENCOUNTER — OFFICE VISIT (OUTPATIENT)
Dept: CARDIOLOGY | Facility: CLINIC | Age: 66
End: 2021-05-20

## 2021-05-20 VITALS
DIASTOLIC BLOOD PRESSURE: 83 MMHG | WEIGHT: 184 LBS | BODY MASS INDEX: 29.7 KG/M2 | SYSTOLIC BLOOD PRESSURE: 146 MMHG | HEART RATE: 96 BPM | OXYGEN SATURATION: 96 %

## 2021-05-20 DIAGNOSIS — I10 ESSENTIAL HYPERTENSION: ICD-10-CM

## 2021-05-20 DIAGNOSIS — F17.200 TOBACCO DEPENDENCE SYNDROME: ICD-10-CM

## 2021-05-20 DIAGNOSIS — R06.02 SHORTNESS OF BREATH: ICD-10-CM

## 2021-05-20 DIAGNOSIS — E11.59 TYPE 2 DIABETES MELLITUS WITH OTHER CIRCULATORY COMPLICATION, UNSPECIFIED WHETHER LONG TERM INSULIN USE (HCC): ICD-10-CM

## 2021-05-20 DIAGNOSIS — Q24.5 CONGENITAL CORONARY ARTERY FISTULA: Primary | ICD-10-CM

## 2021-05-20 DIAGNOSIS — R07.9 CHEST PAIN IN ADULT: ICD-10-CM

## 2021-05-20 DIAGNOSIS — E78.2 MIXED HYPERLIPIDEMIA: ICD-10-CM

## 2021-05-20 DIAGNOSIS — I73.9 CLAUDICATION (HCC): ICD-10-CM

## 2021-05-20 DIAGNOSIS — E66.01 MORBID OBESITY (HCC): ICD-10-CM

## 2021-05-20 DIAGNOSIS — R07.89 CHEST PAIN, ATYPICAL: ICD-10-CM

## 2021-05-20 PROCEDURE — 99214 OFFICE O/P EST MOD 30 MIN: CPT | Performed by: NURSE PRACTITIONER

## 2021-05-20 NOTE — PROGRESS NOTES
[facility]      REASON FOR FOLLOW-UP:  Follow-up ER visit for chest pain            Chief Complaint   Patient presents with   • Hypertension     pt to be seen for Edema and HTN   • Hyperlipidemia   • Edema         Dear Dottie Nunez APRN        History of Present Illness     It was my pleasure to see Marlena in the office today.  As you are aware, she is a very pleasant 66y.o. female with an established history of nonobstructive coronary artery disease.  She is additional history that includes COPD, diabetes mellitus, hypertension, hyperlipidemia, palpitations, tobacco abuse, and antiplatelet therapy.  She was evaluated recently for lower extremity edema.  2D echo was performed that showed normal LV systolic function with EF 66-70%, mild concentric LVH with grade 1 diastolic dysfunction.    Patient was evaluated [facility] emergency department for symptoms of chest pain.  She ruled out for acute coronary syndrome.  ED provider recommended patient be admitted for further noninvasive testing, however patient declined to stay as she has a pet at home that needed her care.  She presents today in follow-up for the above-mentioned diagnoses and ER visit.    Today, patient reports she continues to have some intermittent chest heaviness present over the past few weeks, mild in severity with occasional radiation down her left arm.  She has had some intermittent increase shortness of breath as well.  No significant change with exertion.  Patient denies any associated symptoms such as nausea, diaphoresis, dizziness, presyncopal or syncopal episodes.  Patient also reports recent pain in her legs described as throbbing that occurs with or without activity.  She denies any recent lower extremity edema or discoloration.  Patient states she continues to try to abstain from smoking.    ASSESSMENT:  Chest pain  Known history of coronary artery disease-nonobstructive  Diastolic  dysfunction  Claudication  Diabetes mellitus 2  COPD  Essential hypertension  Ongoing tobacco use    PLAN:  Patient has not had cardiac evaluation in quite some time.  We will schedule patient for nuclear stress test.  We will also order ABIs.  Follow-up after diagnostics        The following portions of the patient's history were reviewed and updated as appropriate: allergies, current medications, past family history, past medical history, past social history, past surgical history and problem list.    REVIEW OF SYSTEMS:    Review of Systems   Cardiovascular: Positive for chest pain, claudication and leg swelling.   All other systems reviewed and are negative.      Vitals:    05/20/21 1447   BP: 146/83   Pulse: 96   SpO2: 96%         PHYSICAL EXAM:    General: Alert, cooperative, no distress, appears stated age  Head:  Normocephalic, atraumatic, mucous membranes moist  Eyes:  Conjunctiva/corneas clear, EOM's intact     Neck:  Supple,  no JVD or bruit     Lungs: Clear to auscultation bilaterally, no wheezes rhonchi rales are noted  Chest wall: No tenderness  Musculoskeletal:   Ambulates freely without assistance  Heart::  Regular rate and rhythm, S1 and S2 normal, no murmur, rub or gallop  Abdomen: Soft, non-tender, nondistended, bowel sounds active, no abdominal bruit  Extremities: No cyanosis, clubbing, or edema   Pulses: 2+ and symmetric all extremities  Skin:  No rashes or lesions  Neuro/psych: A&O x3. CN II through XII are grossly intact with appropriate affect        Past Medical History:   Diagnosis Date   • Anemia    • Arthritis    • Atrial fibrillation (CMS/HCC)     Proxysmal   • Chronic kidney disease    • COPD (chronic obstructive pulmonary disease) (CMS/HCC)    • Dark stools    • Depression    • Disease of thyroid gland    • GERD (gastroesophageal reflux disease)    • History of hernia repair    • Hyperlipidemia    • Hypertension    • IBS (irritable bowel syndrome)    • Liver disease    • Low back pain     • Obesity    • Seizure disorder (CMS/HCC)    • Type 2 diabetes mellitus (CMS/HCC)    • Weight gain        Past Surgical History:   Procedure Laterality Date   • BACK SURGERY     • CARDIAC CATHETERIZATION      [facility]   • CHOLECYSTECTOMY     • ENDOSCOPY  03/31/2015    Normal   • HERNIA REPAIR  01/19/2016    Dr Mota   • HYSTERECTOMY     • TONSILLECTOMY           Current Outpatient Medications:   •  albuterol (PROVENTIL) (2.5 MG/3ML) 0.083% nebulizer solution, Take 2.5 mg by nebulization Every 4 (Four) Hours As Needed for Wheezing., Disp: , Rfl:   •  ARIPiprazole (ABILIFY) 10 MG tablet, Take 1 tablet by mouth Daily., Disp: 30 tablet, Rfl: 3  •  atorvastatin (LIPITOR) 10 MG tablet, Take 10 mg by mouth Daily., Disp: , Rfl:   •  Blood Glucose Calibration (OT ULTRA/FASTTK CNTRL SOLN) solution, , Disp: , Rfl:   •  buPROPion XL (WELLBUTRIN XL) 300 MG 24 hr tablet, Take 1 tablet by mouth Every Morning., Disp: 30 tablet, Rfl: 3  •  cetirizine (zyrTEC) 10 MG tablet, Take 10 mg by mouth Daily., Disp: , Rfl:   •  Comfort EZ Pen Needles 31G X 5 MM misc, , Disp: , Rfl:   •  conjugated estrogens (Premarin) 0.625 MG/GM vaginal cream, #remove., Disp: , Rfl:   •  cyclobenzaprine (FLEXERIL) 5 MG tablet, , Disp: , Rfl:   •  diclofenac (VOLTAREN) 75 MG EC tablet, Take 75 mg by mouth 2 (Two) Times a Day., Disp: , Rfl: 0  •  diphenoxylate-atropine (LOMOTIL) 2.5-0.025 MG per tablet, , Disp: , Rfl:   •  eszopiclone (Lunesta) 3 MG tablet, Take 1 tablet by mouth At Night As Needed for Sleep. Take immediately before bedtime, Disp: 30 tablet, Rfl: 0  •  ezetimibe (ZETIA) 10 MG tablet, , Disp: , Rfl:   •  fluticasone-salmeterol (ADVAIR) 250-50 MCG/DOSE DISKUS, Inhale 1 puff 2 (Two) Times a Day., Disp: , Rfl:   •  furosemide (LASIX) 40 MG tablet, TAKE 1 TABLET BY MOUTH EVERY DAY (BOTTLE), Disp: , Rfl:   •  HumaLOG KwikPen 100 UNIT/ML solution pen-injector, INJECT 4 UNITS INTO SKIN THREE TIMES A DAY  BEFORE MEALS (ACTUAL 125 DAY SUPPLY), Disp: , Rfl:   •  hydrOXYzine (ATARAX) 25 MG tablet, TABLET 1 TO 2 TABLETS BY MOUTH EVERY 8 HOURS AS NEEDED FOR ANXIETY UP TO 10 DAYS, Disp: , Rfl:   •  icosapent ethyl (VASCEPA) 1 g capsule capsule, Take 2 g by mouth Daily., Disp: , Rfl:   •  insulin aspart (novoLOG FLEXPEN) 100 UNIT/ML solution pen-injector sc pen, Inject 5 Units under the skin into the appropriate area as directed 3 (Three) Times a Day With Meals., Disp: , Rfl:   •  insulin degludec (TRESIBA FLEXTOUCH) 100 UNIT/ML solution pen-injector injection, Inject 14 Units under the skin into the appropriate area as directed Daily., Disp: , Rfl:   •  Insulin Lispro, 1 Unit Dial, (HUMALOG) 100 UNIT/ML solution pen-injector, Inject 4 Units under the skin into the appropriate area as directed 3 (Three) Times a Day., Disp: , Rfl:   •  isosorbide mononitrate (IMDUR) 30 MG 24 hr tablet, TAKE ONE TABLET BY MOUTH EVERY MORNING, Disp: 30 tablet, Rfl: 0  •  Lancet Devices (OneTouch Delica Plus Lancing) misc, , Disp: , Rfl:   •  Lancets (OneTouch Delica Plus [id number]) misc, , Disp: , Rfl:   •  levocetirizine (XYZAL) 5 MG tablet, Take 5 mg by mouth Every Evening., Disp: , Rfl:   •  levothyroxine (SYNTHROID, LEVOTHROID) 50 MCG tablet, Take 50 mcg by mouth Daily., Disp: , Rfl:   •  lisinopril (PRINIVIL,ZESTRIL) 2.5 MG tablet, Take 2.5 mg by mouth Daily., Disp: , Rfl:   •  lubiprostone (AMITIZA) 24 MCG capsule, Take 1 capsule by mouth 2 (Two) Times a Day With Meals., Disp: 60 capsule, Rfl: 11  •  metoprolol succinate XL (TOPROL-XL) 25 MG 24 hr tablet, , Disp: , Rfl:   •  metoprolol tartrate (LOPRESSOR) 25 MG tablet, , Disp: , Rfl:   •  montelukast (SINGULAIR) 10 MG tablet, , Disp: , Rfl:   •  Movantik 12.5 MG tablet, , Disp: , Rfl:   •  MULTIPLE VITAMINS ESSENTIAL PO, Take 1 tablet by mouth Daily., Disp: , Rfl:   •  mupirocin (BACTROBAN) 2 % ointment, APPLY SMALL AMOUNT INSIDE EACH NOSTRIL TWICE DAILY FOR THE 5 DAYS PRIOR TO  SURGERY.., Disp: , Rfl:   •  nitrofurantoin, macrocrystal-monohydrate, (MACROBID) 100 MG capsule, Take 100 mg by mouth 2 (Two) Times a Day. Finishes meds today 2/26/21, Disp: , Rfl:   •  nitroglycerin (NITROSTAT) 0.4 MG SL tablet, Place 1 tablet under the tongue Every 5 (Five) Minutes As Needed for Chest Pain. Take no more than 3 doses in 15 minutes., Disp: 30 tablet, Rfl: 5  •  OneTouch Ultra test strip, , Disp: , Rfl:   •  pantoprazole (PROTONIX) 40 MG EC tablet, Take 40 mg by mouth Daily., Disp: , Rfl:   •  potassium chloride (K-DUR,KLOR-CON) 20 MEQ CR tablet, Take 1 tablet by mouth Daily., Disp: 90 tablet, Rfl: 3  •  pregabalin (LYRICA) 100 MG capsule, Take 100 mg by mouth 2 (Two) Times a Day., Disp: , Rfl:   •  promethazine (PHENERGAN) 25 MG tablet, Take 25 mg by mouth Every 6 (Six) Hours As Needed. for nausea, Disp: , Rfl:   •  rOPINIRole (REQUIP) 0.5 MG tablet, , Disp: , Rfl:   •  SITagliptin-metFORMIN HCl ER (JANUMET XR)  MG tablet, Take 2 tablets by mouth Daily., Disp: , Rfl:   •  theophylline (THEODUR) 300 MG 12 hr tablet, , Disp: , Rfl:   •  Unifine Pentips 32G X 4 MM misc, USE 3 TIMES DAILY WITH INSULIN INJECTIONS DX E11.65, Disp: , Rfl:   •  venlafaxine XR (EFFEXOR-XR) 150 MG 24 hr capsule, Take 2 capsules by mouth Daily., Disp: 60 capsule, Rfl: 3  •  oxyCODONE-acetaminophen (Percocet)  MG per tablet, Take 1 tablet by mouth Every 6 (Six) Hours As Needed for Moderate Pain ., Disp: 120 tablet, Rfl: 0  •  oxyCODONE-acetaminophen (Percocet)  MG per tablet, Take 1 tablet by mouth Every 6 (Six) Hours As Needed for Moderate Pain ., Disp: 120 tablet, Rfl: 0  •  oxyCODONE-acetaminophen (Percocet)  MG per tablet, Take 1 tablet by mouth Every 6 (Six) Hours As Needed for Moderate Pain ., Disp: 120 tablet, Rfl: 0  •  tiZANidine (ZANAFLEX) 4 MG tablet, Take 1 tablet by mouth Every 8 (Eight) Hours As Needed for Muscle Spasms., Disp: 90 tablet, Rfl: 11    Allergies   Allergen Reactions   •  "Adhesive Tape Rash     Paper tape causes the rash   • Contrast Dye GI Intolerance   • Iodinated Diagnostic Agents Nausea Only   • Latex Rash   • Penicillins Hives   • Statins Rash     BAD LEG CRAMPS     • Sulfa Antibiotics Hives   • Morphine Rash       Family History   Problem Relation Age of Onset   • Cancer Mother         Other Cancer   • Heart disease Mother    • Hypertension Mother    • Stroke Mother    • Hypertension Father    • Kidney disease Father    • Diabetes Paternal Uncle    • Cancer Paternal Uncle         Colon Cancer       Social History     Tobacco Use   • Smoking status: Current Every Day Smoker     Packs/day: 1.00   • Smokeless tobacco: Never Used   • Tobacco comment: Passive Smoke: N   Substance Use Topics   • Alcohol use: No           Current Electrocardiogram:  Procedures        EMR Dragon/Transcription:   \"Dictated utilizing Dragon dictation\".       "

## 2021-05-21 ENCOUNTER — OFFICE VISIT (OUTPATIENT)
Dept: PAIN MEDICINE | Facility: CLINIC | Age: 66
End: 2021-05-21

## 2021-05-21 VITALS
BODY MASS INDEX: 29.89 KG/M2 | DIASTOLIC BLOOD PRESSURE: 79 MMHG | SYSTOLIC BLOOD PRESSURE: 127 MMHG | RESPIRATION RATE: 16 BRPM | OXYGEN SATURATION: 92 % | WEIGHT: 186 LBS | HEART RATE: 93 BPM | HEIGHT: 66 IN

## 2021-05-21 DIAGNOSIS — G89.29 CHRONIC MIDLINE LOW BACK PAIN WITH SCIATICA, SCIATICA LATERALITY UNSPECIFIED: ICD-10-CM

## 2021-05-21 DIAGNOSIS — M79.605 LEG PAIN, BILATERAL: ICD-10-CM

## 2021-05-21 DIAGNOSIS — M54.40 LUMBAGO OF LUMBAR REGION WITH SCIATICA: Primary | ICD-10-CM

## 2021-05-21 DIAGNOSIS — M54.40 CHRONIC MIDLINE LOW BACK PAIN WITH SCIATICA, SCIATICA LATERALITY UNSPECIFIED: ICD-10-CM

## 2021-05-21 DIAGNOSIS — Z79.899 OTHER LONG TERM (CURRENT) DRUG THERAPY: ICD-10-CM

## 2021-05-21 DIAGNOSIS — M54.2 NECK PAIN: ICD-10-CM

## 2021-05-21 DIAGNOSIS — M79.18 MYOFASCIAL MUSCLE PAIN: ICD-10-CM

## 2021-05-21 DIAGNOSIS — M47.816 LUMBAR SPONDYLOSIS: ICD-10-CM

## 2021-05-21 DIAGNOSIS — M79.604 LEG PAIN, BILATERAL: ICD-10-CM

## 2021-05-21 DIAGNOSIS — R10.84 GENERALIZED ABDOMINAL PAIN: ICD-10-CM

## 2021-05-21 DIAGNOSIS — M54.16 LUMBAR RADICULOPATHY: ICD-10-CM

## 2021-05-21 DIAGNOSIS — M48.061 SPINAL STENOSIS OF LUMBAR REGION, UNSPECIFIED WHETHER NEUROGENIC CLAUDICATION PRESENT: ICD-10-CM

## 2021-05-21 DIAGNOSIS — M54.12 CERVICAL RADICULOPATHY: ICD-10-CM

## 2021-05-21 DIAGNOSIS — M43.16 SPONDYLOLISTHESIS OF LUMBAR REGION: ICD-10-CM

## 2021-05-21 DIAGNOSIS — M25.562 ARTHRALGIA OF LEFT KNEE: ICD-10-CM

## 2021-05-21 DIAGNOSIS — K59.03 CONSTIPATION DUE TO PAIN MEDICATION: ICD-10-CM

## 2021-05-21 PROBLEM — I73.9 CLAUDICATION (HCC): Status: ACTIVE | Noted: 2021-05-21

## 2021-05-21 PROBLEM — R07.9 CHEST PAIN IN ADULT: Status: ACTIVE | Noted: 2021-05-21

## 2021-05-21 PROCEDURE — 99214 OFFICE O/P EST MOD 30 MIN: CPT | Performed by: PHYSICAL MEDICINE & REHABILITATION

## 2021-05-21 RX ORDER — OXYCODONE AND ACETAMINOPHEN 10; 325 MG/1; MG/1
1 TABLET ORAL EVERY 6 HOURS PRN
Qty: 120 TABLET | Refills: 0 | Status: SHIPPED | OUTPATIENT
Start: 2021-05-21 | End: 2021-08-12 | Stop reason: SDUPTHER

## 2021-05-21 RX ORDER — OXYCODONE AND ACETAMINOPHEN 10; 325 MG/1; MG/1
1 TABLET ORAL EVERY 6 HOURS PRN
Qty: 120 TABLET | Refills: 0 | Status: SHIPPED | OUTPATIENT
Start: 2021-05-21 | End: 2021-08-12

## 2021-05-21 RX ORDER — TIZANIDINE 4 MG/1
4 TABLET ORAL EVERY 8 HOURS PRN
Qty: 90 TABLET | Refills: 11 | Status: SHIPPED | OUTPATIENT
Start: 2021-05-21 | End: 2021-08-12 | Stop reason: SDUPTHER

## 2021-05-21 NOTE — PROGRESS NOTES
Subjective   Marlena Avilez is a 66 y.o. female.     Low back and yen. legs pain,   Lumbar Back Pain with BLLE Pain,  RLE Numbness and RLE Weakness,  Lumbar Back Pain is increased with prolonged sitting.  Patient planned Lumbar Epidural #1 with Dr. Pleitez, reports very little relief with LESIs. Pain is 9/10 at best, aching, throbbing, worse with bending and standing, interferes with ADLs, activity, failed injections, meds. MRI L-spine with laminectomy and fusion. Taking Zohydro 15mg BID with Dr. Pleitez with poor relief, Norco 10mg TID with PCP with poor relief, had good relief in past with Percocet 10mg TID prn, restarted, but less relief than Hydrocodone, worsening pain. Current patient of this clinic. Rotated to Norco 10mg QID prn, working well initially but pain worsening, rotated to MS-Contin 15mg TID. C/o constipation controlled by fiber supplement.       The following portions of the patient's history were reviewed and updated as appropriate: allergies, current medications, past family history, past medical history, past social history, past surgical history and problem list.    Review of Systems   Constitutional: Negative for chills, fatigue and fever.   HENT: Positive for hearing loss. Negative for trouble swallowing.    Eyes: Positive for visual disturbance.   Respiratory: Positive for shortness of breath.    Cardiovascular: Negative for chest pain.   Gastrointestinal: Negative for abdominal pain, constipation, diarrhea, nausea and vomiting.   Genitourinary: Negative for urinary incontinence.   Musculoskeletal: Negative for arthralgias, back pain, joint swelling, myalgias and neck pain.   Neurological: Positive for headache. Negative for dizziness, weakness and numbness.       Objective   Physical Exam   Constitutional: She is oriented to person, place, and time. She appears well-developed and well-nourished.   HENT:   Head: Normocephalic and atraumatic.   Eyes: Pupils are equal, round, and reactive to light.    Cardiovascular: Normal rate, regular rhythm and normal heart sounds.   Pulmonary/Chest: Breath sounds normal.   Abdominal: Soft. Bowel sounds are normal. She exhibits no distension. There is no abdominal tenderness.   Musculoskeletal:      Comments: Strength 4/5 globally   Neurological: She is alert and oriented to person, place, and time. She has normal reflexes. She displays normal reflexes. No sensory deficit.   Psychiatric: Her behavior is normal. Thought content normal.         Assessment/Plan   Diagnoses and all orders for this visit:    1. Lumbago of lumbar region with sciatica (Primary)    2. Leg pain, bilateral    3. Neck pain    4. Arthralgia of left knee    5. Cervical radiculopathy    6. Constipation due to pain medication    7. Generalized abdominal pain    8. Lumbar radiculopathy    9. Lumbar spondylosis    10. Myofascial muscle pain    11. Other long term (current) drug therapy    12. Spinal stenosis of lumbar region, unspecified whether neurogenic claudication present    13. Spondylolisthesis of lumbar region        Inspect reviewed, in order. UDS 11/20/20 in order.  Stopped Zohydro, Norco. Failed Percocet 10mg TID prn which had worked well in past   Began Norco 10mg QID prn, was working well, tolerant. Completely failed MS-Contin 15mg TID, denies allergy to morphine. Restarted Percocet, then Norco per request, poor relief.  Restarted Percocet 10mg QID prn, best relief so far, continue.  Increase to Zanaflex 4mg TID prn for worsening muscle pain.  Failed fiber supplement, OTC Colace, Miralax. Began Amitiza 24mcg BID for worsening OIC.  RTC 3 months for f/u.

## 2021-05-26 ENCOUNTER — APPOINTMENT (OUTPATIENT)
Dept: CARDIOLOGY | Facility: HOSPITAL | Age: 66
End: 2021-05-26

## 2021-05-28 ENCOUNTER — HOSPITAL ENCOUNTER (OUTPATIENT)
Dept: CARDIOLOGY | Facility: HOSPITAL | Age: 66
Discharge: HOME | End: 2021-05-28
Admitting: NURSE PRACTITIONER

## 2021-05-28 DIAGNOSIS — I10 ESSENTIAL HYPERTENSION: ICD-10-CM

## 2021-05-28 DIAGNOSIS — R06.02 SHORTNESS OF BREATH: ICD-10-CM

## 2021-05-28 DIAGNOSIS — E11.59 TYPE 2 DIABETES MELLITUS WITH OTHER CIRCULATORY COMPLICATION, UNSPECIFIED WHETHER LONG TERM INSULIN USE (HCC): ICD-10-CM

## 2021-05-28 DIAGNOSIS — Q24.5 CONGENITAL CORONARY ARTERY FISTULA: ICD-10-CM

## 2021-05-28 DIAGNOSIS — R07.89 CHEST PAIN, ATYPICAL: ICD-10-CM

## 2021-05-28 DIAGNOSIS — F17.200 TOBACCO DEPENDENCE SYNDROME: ICD-10-CM

## 2021-05-28 LAB
BH CV LOWER ARTERIAL LEFT ABI RATIO: 0.9
BH CV LOWER ARTERIAL LEFT DORSALIS PEDIS SYS MAX: 105 MMHG
BH CV LOWER ARTERIAL LEFT GREAT TOE SYS MAX: 65 MMHG
BH CV LOWER ARTERIAL LEFT POST TIBIAL SYS MAX: 111 MMHG
BH CV LOWER ARTERIAL LEFT TBI RATIO: 0.53
BH CV LOWER ARTERIAL RIGHT ABI RATIO: 1
BH CV LOWER ARTERIAL RIGHT DORSALIS PEDIS SYS MAX: 113 MMHG
BH CV LOWER ARTERIAL RIGHT GREAT TOE SYS MAX: 97 MMHG
BH CV LOWER ARTERIAL RIGHT POST TIBIAL SYS MAX: 123 MMHG
BH CV LOWER ARTERIAL RIGHT TBI RATIO: 0.79
MAXIMAL PREDICTED HEART RATE: 156 BPM
STRESS TARGET HR: 133 BPM
UPPER ARTERIAL LEFT ARM BRACHIAL SYS MAX: 119 MMHG
UPPER ARTERIAL RIGHT ARM BRACHIAL SYS MAX: 123 MMHG

## 2021-05-28 PROCEDURE — 93922 UPR/L XTREMITY ART 2 LEVELS: CPT

## 2021-06-02 ENCOUNTER — APPOINTMENT (OUTPATIENT)
Dept: CARDIOLOGY | Facility: HOSPITAL | Age: 66
End: 2021-06-02

## 2021-06-24 ENCOUNTER — HOSPITAL ENCOUNTER (EMERGENCY)
Facility: HOSPITAL | Age: 66
Discharge: HOME | End: 2021-06-24
Admitting: EMERGENCY MEDICINE

## 2021-06-24 ENCOUNTER — APPOINTMENT (OUTPATIENT)
Dept: CT IMAGING | Facility: HOSPITAL | Age: 66
End: 2021-06-24

## 2021-06-24 ENCOUNTER — TELEPHONE (OUTPATIENT)
Dept: PSYCHIATRY | Facility: CLINIC | Age: 66
End: 2021-06-24

## 2021-06-24 VITALS
SYSTOLIC BLOOD PRESSURE: 121 MMHG | WEIGHT: 188.93 LBS | HEIGHT: 64 IN | RESPIRATION RATE: 12 BRPM | OXYGEN SATURATION: 94 % | BODY MASS INDEX: 32.26 KG/M2 | HEART RATE: 87 BPM | DIASTOLIC BLOOD PRESSURE: 61 MMHG | TEMPERATURE: 98 F

## 2021-06-24 DIAGNOSIS — N39.0 URINARY TRACT INFECTION WITHOUT HEMATURIA, SITE UNSPECIFIED: Primary | ICD-10-CM

## 2021-06-24 DIAGNOSIS — R10.9 ABDOMINAL PAIN, UNSPECIFIED ABDOMINAL LOCATION: ICD-10-CM

## 2021-06-24 LAB
ALBUMIN SERPL-MCNC: 4.2 G/DL (ref 3.5–5.2)
ALBUMIN/GLOB SERPL: 1.5 G/DL
ALP SERPL-CCNC: 133 U/L (ref 39–117)
ALT SERPL W P-5'-P-CCNC: 12 U/L (ref 1–33)
ANION GAP SERPL CALCULATED.3IONS-SCNC: 12 MMOL/L (ref 5–15)
AST SERPL-CCNC: 17 U/L (ref 1–32)
BACTERIA UR QL AUTO: ABNORMAL /HPF
BASOPHILS # BLD AUTO: 0.1 10*3/MM3 (ref 0–0.2)
BASOPHILS NFR BLD AUTO: 1.2 % (ref 0–1.5)
BILIRUB SERPL-MCNC: 0.3 MG/DL (ref 0–1.2)
BILIRUB UR QL STRIP: NEGATIVE
BUN SERPL-MCNC: 32 MG/DL (ref 8–23)
BUN/CREAT SERPL: 28.1 (ref 7–25)
CALCIUM SPEC-SCNC: 9.8 MG/DL (ref 8.6–10.5)
CHLORIDE SERPL-SCNC: 96 MMOL/L (ref 98–107)
CLARITY UR: CLEAR
CO2 SERPL-SCNC: 25 MMOL/L (ref 22–29)
COLOR UR: YELLOW
CREAT SERPL-MCNC: 1.14 MG/DL (ref 0.57–1)
DEPRECATED RDW RBC AUTO: 43.8 FL (ref 37–54)
EOSINOPHIL # BLD AUTO: 0.4 10*3/MM3 (ref 0–0.4)
EOSINOPHIL NFR BLD AUTO: 4.1 % (ref 0.3–6.2)
ERYTHROCYTE [DISTWIDTH] IN BLOOD BY AUTOMATED COUNT: 14.3 % (ref 12.3–15.4)
GFR SERPL CREATININE-BSD FRML MDRD: 48 ML/MIN/1.73
GLOBULIN UR ELPH-MCNC: 2.8 GM/DL
GLUCOSE SERPL-MCNC: 149 MG/DL (ref 65–99)
GLUCOSE UR STRIP-MCNC: NEGATIVE MG/DL
HCT VFR BLD AUTO: 36.4 % (ref 34–46.6)
HGB BLD-MCNC: 12.7 G/DL (ref 12–15.9)
HGB UR QL STRIP.AUTO: NEGATIVE
HYALINE CASTS UR QL AUTO: ABNORMAL /LPF
KETONES UR QL STRIP: NEGATIVE
LEUKOCYTE ESTERASE UR QL STRIP.AUTO: NEGATIVE
LIPASE SERPL-CCNC: 34 U/L (ref 13–60)
LYMPHOCYTES # BLD AUTO: 2.8 10*3/MM3 (ref 0.7–3.1)
LYMPHOCYTES NFR BLD AUTO: 32.4 % (ref 19.6–45.3)
MCH RBC QN AUTO: 30.8 PG (ref 26.6–33)
MCHC RBC AUTO-ENTMCNC: 34.9 G/DL (ref 31.5–35.7)
MCV RBC AUTO: 88.5 FL (ref 79–97)
MONOCYTES # BLD AUTO: 0.7 10*3/MM3 (ref 0.1–0.9)
MONOCYTES NFR BLD AUTO: 8 % (ref 5–12)
NEUTROPHILS NFR BLD AUTO: 4.7 10*3/MM3 (ref 1.7–7)
NEUTROPHILS NFR BLD AUTO: 54.3 % (ref 42.7–76)
NITRITE UR QL STRIP: POSITIVE
NRBC BLD AUTO-RTO: 0 /100 WBC (ref 0–0.2)
PH UR STRIP.AUTO: 5.5 [PH] (ref 5–8)
PLATELET # BLD AUTO: 226 10*3/MM3 (ref 140–450)
PMV BLD AUTO: 9.5 FL (ref 6–12)
POTASSIUM SERPL-SCNC: 4.6 MMOL/L (ref 3.5–5.2)
PROT SERPL-MCNC: 7 G/DL (ref 6–8.5)
PROT UR QL STRIP: NEGATIVE
RBC # BLD AUTO: 4.11 10*6/MM3 (ref 3.77–5.28)
RBC # UR: ABNORMAL /HPF
REF LAB TEST METHOD: ABNORMAL
SODIUM SERPL-SCNC: 133 MMOL/L (ref 136–145)
SP GR UR STRIP: 1.01 (ref 1–1.03)
SQUAMOUS #/AREA URNS HPF: ABNORMAL /HPF
UROBILINOGEN UR QL STRIP: ABNORMAL
WBC # BLD AUTO: 8.7 10*3/MM3 (ref 3.4–10.8)
WBC UR QL AUTO: ABNORMAL /HPF

## 2021-06-24 PROCEDURE — 99283 EMERGENCY DEPT VISIT LOW MDM: CPT

## 2021-06-24 PROCEDURE — 81001 URINALYSIS AUTO W/SCOPE: CPT | Performed by: PHYSICIAN ASSISTANT

## 2021-06-24 PROCEDURE — 74176 CT ABD & PELVIS W/O CONTRAST: CPT

## 2021-06-24 PROCEDURE — 80053 COMPREHEN METABOLIC PANEL: CPT | Performed by: PHYSICIAN ASSISTANT

## 2021-06-24 PROCEDURE — 25010000002 CEFTRIAXONE PER 250 MG: Performed by: PHYSICIAN ASSISTANT

## 2021-06-24 PROCEDURE — 96374 THER/PROPH/DIAG INJ IV PUSH: CPT

## 2021-06-24 PROCEDURE — 83690 ASSAY OF LIPASE: CPT | Performed by: PHYSICIAN ASSISTANT

## 2021-06-24 PROCEDURE — 85025 COMPLETE CBC W/AUTO DIFF WBC: CPT | Performed by: PHYSICIAN ASSISTANT

## 2021-06-24 PROCEDURE — 96376 TX/PRO/DX INJ SAME DRUG ADON: CPT

## 2021-06-24 PROCEDURE — 96375 TX/PRO/DX INJ NEW DRUG ADDON: CPT

## 2021-06-24 PROCEDURE — 25010000002 ONDANSETRON PER 1 MG: Performed by: PHYSICIAN ASSISTANT

## 2021-06-24 PROCEDURE — 25010000002 HYDROMORPHONE PER 4 MG: Performed by: PHYSICIAN ASSISTANT

## 2021-06-24 PROCEDURE — P9612 CATHETERIZE FOR URINE SPEC: HCPCS

## 2021-06-24 RX ORDER — CEFDINIR 300 MG/1
300 CAPSULE ORAL 2 TIMES DAILY
Qty: 14 CAPSULE | Refills: 0 | Status: SHIPPED | OUTPATIENT
Start: 2021-06-24 | End: 2021-08-12

## 2021-06-24 RX ORDER — HYDROMORPHONE HCL 110MG/55ML
0.5 PATIENT CONTROLLED ANALGESIA SYRINGE INTRAVENOUS ONCE
Status: COMPLETED | OUTPATIENT
Start: 2021-06-24 | End: 2021-06-24

## 2021-06-24 RX ORDER — ONDANSETRON 2 MG/ML
4 INJECTION INTRAMUSCULAR; INTRAVENOUS ONCE
Status: COMPLETED | OUTPATIENT
Start: 2021-06-24 | End: 2021-06-24

## 2021-06-24 RX ORDER — FUROSEMIDE 10 MG/ML
INJECTION INTRAMUSCULAR; INTRAVENOUS
Status: DISCONTINUED
Start: 2021-06-24 | End: 2021-06-24 | Stop reason: WASHOUT

## 2021-06-24 RX ORDER — METHYLPREDNISOLONE SODIUM SUCCINATE 125 MG/2ML
INJECTION, POWDER, LYOPHILIZED, FOR SOLUTION INTRAMUSCULAR; INTRAVENOUS
Status: DISCONTINUED
Start: 2021-06-24 | End: 2021-06-24 | Stop reason: WASHOUT

## 2021-06-24 RX ORDER — SODIUM CHLORIDE 0.9 % (FLUSH) 0.9 %
10 SYRINGE (ML) INJECTION AS NEEDED
Status: DISCONTINUED | OUTPATIENT
Start: 2021-06-24 | End: 2021-06-24 | Stop reason: HOSPADM

## 2021-06-24 RX ORDER — TOPIRAMATE 25 MG/1
25 TABLET ORAL 2 TIMES DAILY
Qty: 60 TABLET | Refills: 2 | Status: SHIPPED | OUTPATIENT
Start: 2021-06-24 | End: 2021-10-01

## 2021-06-24 RX ADMIN — ONDANSETRON 4 MG: 2 INJECTION INTRAMUSCULAR; INTRAVENOUS at 10:03

## 2021-06-24 RX ADMIN — HYDROMORPHONE HYDROCHLORIDE 0.5 MG: 2 INJECTION, SOLUTION INTRAMUSCULAR; INTRAVENOUS; SUBCUTANEOUS at 12:37

## 2021-06-24 RX ADMIN — WATER 1 G: 1000 INJECTION, SOLUTION INTRAVENOUS at 12:19

## 2021-06-24 RX ADMIN — HYDROMORPHONE HYDROCHLORIDE 0.5 MG: 2 INJECTION, SOLUTION INTRAMUSCULAR; INTRAVENOUS; SUBCUTANEOUS at 10:04

## 2021-06-24 NOTE — TELEPHONE ENCOUNTER
Angeles called and asked if you would take her off her Abilify and put her back on the xanax because she is having more anxiety attacks she said.

## 2021-06-24 NOTE — TELEPHONE ENCOUNTER
Abilify is not for the anxiety.  She needs to continue that for her depression.  I have sent in a prescription for Topamax to take twice daily for her anxiety instead of restarting the Xanax since it was so difficult to getting her off of it the last time.  Please ask her if she is still taking Lyrica for pain.  If not, we can also use gabapentin for the anxiety.  Please ask her to bring a printout of her current medications from her pharmacy when she comes for her next visit.  I think her medication list is not accurate in the computer since her hospitalization.  We need to update her current medications

## 2021-06-29 NOTE — TELEPHONE ENCOUNTER
Pt notified, verbalized understanding of directions, agreed to bring current medication list to next visit

## 2021-07-12 ENCOUNTER — APPOINTMENT (OUTPATIENT)
Dept: CARDIOLOGY | Facility: HOSPITAL | Age: 66
End: 2021-07-12

## 2021-07-15 ENCOUNTER — APPOINTMENT (OUTPATIENT)
Dept: CARDIOLOGY | Facility: HOSPITAL | Age: 66
End: 2021-07-15

## 2021-07-20 RX ORDER — OXYCODONE AND ACETAMINOPHEN 10; 325 MG/1; MG/1
TABLET ORAL
Qty: 120 TABLET | Refills: 0 | OUTPATIENT
Start: 2021-07-20

## 2021-08-11 RX ORDER — LUBIPROSTONE 24 UG/1
CAPSULE ORAL
Qty: 60 CAPSULE | Refills: 11 | Status: SHIPPED | OUTPATIENT
Start: 2021-08-11 | End: 2022-07-15

## 2021-08-12 ENCOUNTER — OFFICE VISIT (OUTPATIENT)
Dept: PAIN MEDICINE | Facility: CLINIC | Age: 66
End: 2021-08-12

## 2021-08-12 VITALS
BODY MASS INDEX: 32.1 KG/M2 | WEIGHT: 188 LBS | HEIGHT: 64 IN | OXYGEN SATURATION: 100 % | DIASTOLIC BLOOD PRESSURE: 91 MMHG | SYSTOLIC BLOOD PRESSURE: 128 MMHG | HEART RATE: 87 BPM | RESPIRATION RATE: 14 BRPM

## 2021-08-12 DIAGNOSIS — M54.16 LUMBAR RADICULOPATHY: ICD-10-CM

## 2021-08-12 DIAGNOSIS — M79.604 LEG PAIN, BILATERAL: ICD-10-CM

## 2021-08-12 DIAGNOSIS — M48.061 SPINAL STENOSIS OF LUMBAR REGION, UNSPECIFIED WHETHER NEUROGENIC CLAUDICATION PRESENT: ICD-10-CM

## 2021-08-12 DIAGNOSIS — M54.12 CERVICAL RADICULOPATHY: ICD-10-CM

## 2021-08-12 DIAGNOSIS — G89.29 CHRONIC MIDLINE LOW BACK PAIN WITH SCIATICA, SCIATICA LATERALITY UNSPECIFIED: Primary | ICD-10-CM

## 2021-08-12 DIAGNOSIS — M54.40 LUMBAGO OF LUMBAR REGION WITH SCIATICA: ICD-10-CM

## 2021-08-12 DIAGNOSIS — M79.7 FIBROMYOSITIS: ICD-10-CM

## 2021-08-12 DIAGNOSIS — M79.605 LEG PAIN, BILATERAL: ICD-10-CM

## 2021-08-12 DIAGNOSIS — M47.816 LUMBAR SPONDYLOSIS: ICD-10-CM

## 2021-08-12 DIAGNOSIS — K59.03 CONSTIPATION DUE TO PAIN MEDICATION: ICD-10-CM

## 2021-08-12 DIAGNOSIS — M54.40 CHRONIC MIDLINE LOW BACK PAIN WITH SCIATICA, SCIATICA LATERALITY UNSPECIFIED: Primary | ICD-10-CM

## 2021-08-12 PROCEDURE — 99214 OFFICE O/P EST MOD 30 MIN: CPT | Performed by: PHYSICAL MEDICINE & REHABILITATION

## 2021-08-12 RX ORDER — OXYCODONE AND ACETAMINOPHEN 10; 325 MG/1; MG/1
1 TABLET ORAL EVERY 6 HOURS PRN
Qty: 120 TABLET | Refills: 0 | Status: SHIPPED | OUTPATIENT
Start: 2021-08-12 | End: 2022-02-10

## 2021-08-12 RX ORDER — ARIPIPRAZOLE 5 MG/1
5 TABLET ORAL DAILY
COMMUNITY
Start: 2021-07-12 | End: 2021-10-19 | Stop reason: SDUPTHER

## 2021-08-12 RX ORDER — ACETAMINOPHEN 500 MG
1 TABLET ORAL DAILY
COMMUNITY
Start: 2021-06-23 | End: 2021-09-21

## 2021-08-12 RX ORDER — TIZANIDINE 4 MG/1
6 TABLET ORAL EVERY 8 HOURS PRN
Qty: 135 TABLET | Refills: 11 | Status: SHIPPED | OUTPATIENT
Start: 2021-08-12

## 2021-08-12 RX ORDER — PRAVASTATIN SODIUM 20 MG
TABLET ORAL
COMMUNITY
Start: 2021-07-12 | End: 2021-08-12 | Stop reason: ALTCHOICE

## 2021-08-12 RX ORDER — MOMETASONE FUROATE 50 UG/1
2 SPRAY, METERED NASAL DAILY
COMMUNITY
Start: 2021-08-05 | End: 2022-10-12

## 2021-08-12 RX ORDER — ERGOCALCIFEROL 1.25 MG/1
50000 CAPSULE ORAL
COMMUNITY
Start: 2021-07-12

## 2021-08-12 NOTE — PROGRESS NOTES
Subjective   Marlena Avilez is a 66 y.o. female.     Low back and yen. legs pain,   Lumbar Back Pain with BLLE Pain,  RLE Numbness and RLE Weakness,  Lumbar Back Pain is increased with prolonged sitting.  Patient planned Lumbar Epidural #1 with Dr. Pleitez, reports very little relief with LESIs. Pain is 9/10 at best, aching, throbbing, worse with bending and standing, interferes with ADLs, activity, failed injections, meds. MRI L-spine with laminectomy and fusion. Taking Zohydro 15mg BID with Dr. Pleitez with poor relief, Norco 10mg TID with PCP with poor relief, had good relief in past with Percocet 10mg TID prn, restarted, but less relief than Hydrocodone, worsening pain. Current patient of this clinic. Rotated to Norco 10mg QID prn, working well initially but pain worsening, rotated to MS-Contin 15mg TID. C/o constipation controlled by fiber supplement.       The following portions of the patient's history were reviewed and updated as appropriate: allergies, current medications, past family history, past medical history, past social history, past surgical history and problem list.    Review of Systems   Constitutional: Negative for chills, fatigue and fever.   HENT: Positive for hearing loss. Negative for trouble swallowing.    Eyes: Positive for visual disturbance.   Respiratory: Positive for shortness of breath.    Cardiovascular: Negative for chest pain.   Gastrointestinal: Negative for abdominal pain, constipation, diarrhea, nausea and vomiting.   Genitourinary: Negative for urinary incontinence.   Musculoskeletal: Negative for arthralgias, back pain, joint swelling, myalgias and neck pain.   Neurological: Positive for headache. Negative for dizziness, weakness and numbness.       Objective   Physical Exam   Constitutional: She is oriented to person, place, and time. She appears well-developed and well-nourished.   HENT:   Head: Normocephalic and atraumatic.   Eyes: Pupils are equal, round, and reactive to light.    Cardiovascular: Normal rate, regular rhythm and normal heart sounds.   Pulmonary/Chest: Breath sounds normal.   Abdominal: Soft. Bowel sounds are normal. She exhibits no distension. There is no abdominal tenderness.   Musculoskeletal:      Comments: Strength 4/5 globally   Neurological: She is alert and oriented to person, place, and time. She has normal reflexes. She displays normal reflexes. No sensory deficit.   Psychiatric: Her behavior is normal. Thought content normal.         Assessment/Plan   Diagnoses and all orders for this visit:    1. Chronic midline low back pain with sciatica, sciatica laterality unspecified (Primary)    2. Lumbar radiculopathy    3. Lumbar spondylosis    4. Leg pain, bilateral    5. Fibromyositis    6. Constipation due to pain medication    7. Cervical radiculopathy    8. Spinal stenosis of lumbar region, unspecified whether neurogenic claudication present        Inspect reviewed, in order. UDS 11/20/20 in order.  Stopped Zohydro, Norco. Failed Percocet 10mg TID prn which had worked well in past   Began Norco 10mg QID prn, was working well, tolerant. Completely failed MS-Contin 15mg TID, denies allergy to morphine. Restarted Percocet, then Norco per request, poor relief.  Restarted Percocet 10mg QID prn, best relief so far, continue.  Increase to Zanaflex 6mg TID prn for worsening muscle pain.  Failed fiber supplement, OTC Colace, Miralax. Began Amitiza 24mcg BID for worsening OIC.  RTC 3 months for f/u.

## 2021-09-03 DIAGNOSIS — F34.1 DYSTHYMIA: Chronic | ICD-10-CM

## 2021-09-07 RX ORDER — VENLAFAXINE HYDROCHLORIDE 150 MG/1
300 CAPSULE, EXTENDED RELEASE ORAL DAILY
Qty: 60 CAPSULE | Refills: 2 | Status: SHIPPED | OUTPATIENT
Start: 2021-09-07 | End: 2021-12-16 | Stop reason: SDUPTHER

## 2021-09-07 RX ORDER — BUPROPION HYDROCHLORIDE 300 MG/1
300 TABLET ORAL EVERY MORNING
Qty: 30 TABLET | Refills: 2 | OUTPATIENT
Start: 2021-09-07 | End: 2022-09-07

## 2021-09-15 ENCOUNTER — TELEPHONE (OUTPATIENT)
Dept: CARDIOLOGY | Facility: CLINIC | Age: 66
End: 2021-09-15

## 2021-09-15 NOTE — TELEPHONE ENCOUNTER
Returned patient's call and she stated that she has pain from her groin area down her leg.I advised her to contact her Pain Management doctor who treats her for this condition or to try her PCP.In the mean time if it worsens go to ER.She verbalized understanding.

## 2021-09-20 RX ORDER — OXYCODONE AND ACETAMINOPHEN 10; 325 MG/1; MG/1
TABLET ORAL
Qty: 120 TABLET | Refills: 0 | OUTPATIENT
Start: 2021-09-20

## 2021-09-28 ENCOUNTER — TELEPHONE (OUTPATIENT)
Dept: PAIN MEDICINE | Facility: CLINIC | Age: 66
End: 2021-09-28

## 2021-09-28 NOTE — TELEPHONE ENCOUNTER
I don't think we have them in the system. Is there a fax number we can send a script pad order to? Thanks.

## 2021-09-28 NOTE — TELEPHONE ENCOUNTER
Caller: GITA     Relationship: NURSE  WITH YANELY Storm call back number: [phone]    What is the medical concern/diagnosis: CHRONIC BACK PAIN &     What specialty or service is being requested:  PHYSICAL THERAPY    What is the provider, practice or medical service name:  [facility]     What is the office location:      What is the office phone number: [phone]    Any additional details:  GITA NURSE  WITH ORION ADVISED SHE NEEDS DR. BEARD TO SEND A PHYSICAL THERAPY ORDER TO [facility].

## 2021-09-29 NOTE — TELEPHONE ENCOUNTER
Nurse from [facility] says they can't take pt at this time, she has a hx of noncompliance.

## 2021-10-01 RX ORDER — TOPIRAMATE 25 MG/1
TABLET ORAL
Qty: 60 TABLET | Refills: 1 | Status: SHIPPED | OUTPATIENT
Start: 2021-10-01 | End: 2021-10-19 | Stop reason: ALTCHOICE

## 2021-10-05 ENCOUNTER — TELEPHONE (OUTPATIENT)
Dept: PAIN MEDICINE | Facility: CLINIC | Age: 66
End: 2021-10-05

## 2021-10-05 RX ORDER — BACLOFEN 10 MG/1
10 TABLET ORAL 3 TIMES DAILY PRN
Qty: 90 TABLET | Refills: 2 | Status: SHIPPED | OUTPATIENT
Start: 2021-10-05

## 2021-10-05 NOTE — TELEPHONE ENCOUNTER
Provider: DR BEARD    Caller: TOÑITO TREADWELL    Relationship to Patient: SELF    Pharmacy: [facility] IN [place]    Phone Number: [phone]    Reason for Call: PT REPORTS A LOT OF PAIN IN THE BACK OF HER LEGS AND HER LOWER BACK, HAVING TROUBLE SLEEPING. SHE WANTS TO SEE IF DR BEARD WILL CHANGE HER MEDICATIONS TO HELP WITH THIS. PLEASE CALL HER BACK AT THE NUMBER ABOVE.

## 2021-10-06 ENCOUNTER — APPOINTMENT (OUTPATIENT)
Dept: CT IMAGING | Facility: HOSPITAL | Age: 66
End: 2021-10-06

## 2021-10-06 ENCOUNTER — HOSPITAL ENCOUNTER (EMERGENCY)
Facility: HOSPITAL | Age: 66
Discharge: HOME | End: 2021-10-06
Attending: EMERGENCY MEDICINE | Admitting: EMERGENCY MEDICINE

## 2021-10-06 VITALS
BODY MASS INDEX: 29.51 KG/M2 | HEART RATE: 86 BPM | HEIGHT: 67 IN | DIASTOLIC BLOOD PRESSURE: 61 MMHG | SYSTOLIC BLOOD PRESSURE: 115 MMHG | TEMPERATURE: 98.7 F | RESPIRATION RATE: 16 BRPM | WEIGHT: 188 LBS | OXYGEN SATURATION: 95 %

## 2021-10-06 DIAGNOSIS — R10.84 GENERALIZED ABDOMINAL PAIN: Primary | ICD-10-CM

## 2021-10-06 LAB
ANION GAP SERPL CALCULATED.3IONS-SCNC: 14 MMOL/L (ref 5–15)
BASOPHILS # BLD AUTO: 0.1 10*3/MM3 (ref 0–0.2)
BASOPHILS NFR BLD AUTO: 1.1 % (ref 0–1.5)
BILIRUB UR QL STRIP: NEGATIVE
BUN SERPL-MCNC: 23 MG/DL (ref 8–23)
BUN/CREAT SERPL: 20.9 (ref 7–25)
CALCIUM SPEC-SCNC: 10.3 MG/DL (ref 8.6–10.5)
CHLORIDE SERPL-SCNC: 99 MMOL/L (ref 98–107)
CLARITY UR: CLEAR
CO2 SERPL-SCNC: 27 MMOL/L (ref 22–29)
COLOR UR: YELLOW
CREAT SERPL-MCNC: 1.1 MG/DL (ref 0.57–1)
DEPRECATED RDW RBC AUTO: 41.6 FL (ref 37–54)
EOSINOPHIL # BLD AUTO: 0.4 10*3/MM3 (ref 0–0.4)
EOSINOPHIL NFR BLD AUTO: 2.8 % (ref 0.3–6.2)
ERYTHROCYTE [DISTWIDTH] IN BLOOD BY AUTOMATED COUNT: 13.1 % (ref 12.3–15.4)
GFR SERPL CREATININE-BSD FRML MDRD: 50 ML/MIN/1.73
GLUCOSE SERPL-MCNC: 141 MG/DL (ref 65–99)
GLUCOSE UR STRIP-MCNC: NEGATIVE MG/DL
HCT VFR BLD AUTO: 41.3 % (ref 34–46.6)
HGB BLD-MCNC: 14.1 G/DL (ref 12–15.9)
HGB UR QL STRIP.AUTO: NEGATIVE
HOLD SPECIMEN: NORMAL
HOLD SPECIMEN: NORMAL
KETONES UR QL STRIP: NEGATIVE
LEUKOCYTE ESTERASE UR QL STRIP.AUTO: NEGATIVE
LYMPHOCYTES # BLD AUTO: 3.5 10*3/MM3 (ref 0.7–3.1)
LYMPHOCYTES NFR BLD AUTO: 27.8 % (ref 19.6–45.3)
MCH RBC QN AUTO: 30.5 PG (ref 26.6–33)
MCHC RBC AUTO-ENTMCNC: 34.2 G/DL (ref 31.5–35.7)
MCV RBC AUTO: 89.1 FL (ref 79–97)
MONOCYTES # BLD AUTO: 0.9 10*3/MM3 (ref 0.1–0.9)
MONOCYTES NFR BLD AUTO: 6.8 % (ref 5–12)
NEUTROPHILS NFR BLD AUTO: 61.5 % (ref 42.7–76)
NEUTROPHILS NFR BLD AUTO: 7.8 10*3/MM3 (ref 1.7–7)
NITRITE UR QL STRIP: NEGATIVE
NRBC BLD AUTO-RTO: 0.2 /100 WBC (ref 0–0.2)
PH UR STRIP.AUTO: 5.5 [PH] (ref 5–8)
PLATELET # BLD AUTO: 277 10*3/MM3 (ref 140–450)
PMV BLD AUTO: 9.1 FL (ref 6–12)
POTASSIUM SERPL-SCNC: 4.7 MMOL/L (ref 3.5–5.2)
PROT UR QL STRIP: NEGATIVE
RBC # BLD AUTO: 4.64 10*6/MM3 (ref 3.77–5.28)
SODIUM SERPL-SCNC: 140 MMOL/L (ref 136–145)
SP GR UR STRIP: 1.01 (ref 1–1.03)
UROBILINOGEN UR QL STRIP: NORMAL
WBC # BLD AUTO: 12.7 10*3/MM3 (ref 3.4–10.8)
WHOLE BLOOD HOLD SPECIMEN: NORMAL

## 2021-10-06 PROCEDURE — 96376 TX/PRO/DX INJ SAME DRUG ADON: CPT

## 2021-10-06 PROCEDURE — 81003 URINALYSIS AUTO W/O SCOPE: CPT | Performed by: EMERGENCY MEDICINE

## 2021-10-06 PROCEDURE — 99284 EMERGENCY DEPT VISIT MOD MDM: CPT

## 2021-10-06 PROCEDURE — 74176 CT ABD & PELVIS W/O CONTRAST: CPT

## 2021-10-06 PROCEDURE — 25010000002 KETOROLAC TROMETHAMINE PER 15 MG: Performed by: EMERGENCY MEDICINE

## 2021-10-06 PROCEDURE — 80048 BASIC METABOLIC PNL TOTAL CA: CPT | Performed by: EMERGENCY MEDICINE

## 2021-10-06 PROCEDURE — 96375 TX/PRO/DX INJ NEW DRUG ADDON: CPT

## 2021-10-06 PROCEDURE — 25010000002 ONDANSETRON PER 1 MG: Performed by: EMERGENCY MEDICINE

## 2021-10-06 PROCEDURE — 85025 COMPLETE CBC W/AUTO DIFF WBC: CPT | Performed by: EMERGENCY MEDICINE

## 2021-10-06 PROCEDURE — 96374 THER/PROPH/DIAG INJ IV PUSH: CPT

## 2021-10-06 PROCEDURE — P9612 CATHETERIZE FOR URINE SPEC: HCPCS

## 2021-10-06 RX ORDER — SODIUM CHLORIDE 0.9 % (FLUSH) 0.9 %
10 SYRINGE (ML) INJECTION AS NEEDED
Status: DISCONTINUED | OUTPATIENT
Start: 2021-10-06 | End: 2021-10-07 | Stop reason: HOSPADM

## 2021-10-06 RX ORDER — ONDANSETRON 2 MG/ML
4 INJECTION INTRAMUSCULAR; INTRAVENOUS ONCE
Status: COMPLETED | OUTPATIENT
Start: 2021-10-06 | End: 2021-10-06

## 2021-10-06 RX ORDER — KETOROLAC TROMETHAMINE 15 MG/ML
15 INJECTION, SOLUTION INTRAMUSCULAR; INTRAVENOUS ONCE
Status: COMPLETED | OUTPATIENT
Start: 2021-10-06 | End: 2021-10-06

## 2021-10-06 RX ORDER — NAPROXEN 375 MG/1
375 TABLET ORAL 2 TIMES DAILY PRN
Qty: 14 TABLET | Refills: 0 | Status: SHIPPED | OUTPATIENT
Start: 2021-10-06

## 2021-10-06 RX ADMIN — Medication 10 ML: at 20:54

## 2021-10-06 RX ADMIN — KETOROLAC TROMETHAMINE 15 MG: 15 INJECTION, SOLUTION INTRAMUSCULAR; INTRAVENOUS at 21:38

## 2021-10-06 RX ADMIN — KETOROLAC TROMETHAMINE 15 MG: 15 INJECTION, SOLUTION INTRAMUSCULAR; INTRAVENOUS at 20:53

## 2021-10-06 RX ADMIN — ONDANSETRON 4 MG: 2 INJECTION INTRAMUSCULAR; INTRAVENOUS at 20:53

## 2021-10-07 NOTE — ED NOTES
Pt c/o continued pain after administration of pain medication. Discussed with Dr. Peng, verbal order to give additional dose of Toradol 15mg IV.      Debbie Rene RN  10/06/21 5660

## 2021-10-07 NOTE — ED PROVIDER NOTES
Subjective   Patient is a 66-year-old female with right flank pain that developed over the past several days.  The pain is moderate and constant without radiation.  Denies cough fever vomit diarrhea or other complaint.          Review of Systems  Negative for headache ears no cough fever chest pain shortness of breath Bondar dysuria achiness weight loss or other complaint.  A complete system was obtained and is otherwise negative  Past Medical History:   Diagnosis Date   • Anemia    • Arthritis    • Atrial fibrillation (CMS/HCC)     Proxysmal   • Chronic kidney disease    • COPD (chronic obstructive pulmonary disease) (CMS/HCC)    • Dark stools    • Depression    • Disease of thyroid gland    • GERD (gastroesophageal reflux disease)    • History of hernia repair    • Hyperlipidemia    • Hypertension    • IBS (irritable bowel syndrome)    • Liver disease    • Low back pain    • Obesity    • Seizure disorder (CMS/HCC)    • Type 2 diabetes mellitus (CMS/HCC)    • Weight gain        Allergies   Allergen Reactions   • Adhesive Tape Rash     Paper tape causes the rash   • Contrast Dye GI Intolerance   • Iodinated Diagnostic Agents Nausea Only   • Latex Rash   • Penicillins Hives   • Statins Rash     BAD LEG CRAMPS     • Sulfa Antibiotics Hives   • Morphine Rash       Past Surgical History:   Procedure Laterality Date   • BACK SURGERY     • CARDIAC CATHETERIZATION      Abstraction from [facility]: [facility]? 1980's, 3-16   • CHOLECYSTECTOMY     • ENDOSCOPY  03/31/2015    Normal   • HERNIA REPAIR  01/19/2016    Dr Mota   • HYSTERECTOMY     • TONSILLECTOMY         Family History   Problem Relation Age of Onset   • Cancer Mother         Other Cancer   • Heart disease Mother    • Hypertension Mother    • Stroke Mother    • Hypertension Father    • Kidney disease Father    • Diabetes Paternal Uncle    • Cancer Paternal Uncle         Colon Cancer       Social History     Socioeconomic History   • Marital status: Single      Spouse name: Not on file   • Number of children: Not on file   • Years of education: Not on file   • Highest education level: Not on file   Tobacco Use   • Smoking status: Current Every Day Smoker     Packs/day: 0.50   • Smokeless tobacco: Never Used   • Tobacco comment: Passive Smoke: N   Vaping Use   • Vaping Use: Former   Substance and Sexual Activity   • Alcohol use: No   • Drug use: Defer     Comment: no cbd   • Sexual activity: Defer           Objective   Physical Exam  HEENT exam shows TMs to be clear.  Oropharynx comers but sclera nonicteric.  Neck has no adenopathy JVD or bruits.  Lungs clear.  Heart has rate rhythm.  Chest nontender.  Abdomen soft with mild left flank tenderness.  Patient has normal bowel sounds without any guarding.  Back has no CVA tenderness.  Procedures           ED Course            Results for orders placed or performed during the hospital encounter of 10/06/21   Basic Metabolic Panel    Specimen: Blood   Result Value Ref Range    Glucose 141 (H) 65 - 99 mg/dL    BUN 23 8 - 23 mg/dL    Creatinine 1.10 (H) 0.57 - 1.00 mg/dL    Sodium 140 136 - 145 mmol/L    Potassium 4.7 3.5 - 5.2 mmol/L    Chloride 99 98 - 107 mmol/L    CO2 27.0 22.0 - 29.0 mmol/L    Calcium 10.3 8.6 - 10.5 mg/dL    eGFR Non African Amer 50 (L) >60 mL/min/1.73    BUN/Creatinine Ratio 20.9 7.0 - 25.0    Anion Gap 14.0 5.0 - 15.0 mmol/L   Urinalysis With Microscopic If Indicated (No Culture) - Urine, Catheter    Specimen: Urine, Catheter   Result Value Ref Range    Color, UA Yellow Yellow, Straw    Appearance, UA Clear Clear    pH, UA 5.5 5.0 - 8.0    Specific Gravity, UA 1.009 1.005 - 1.030    Glucose, UA Negative Negative    Ketones, UA Negative Negative    Bilirubin, UA Negative Negative    Blood, UA Negative Negative    Protein, UA Negative Negative    Leuk Esterase, UA Negative Negative    Nitrite, UA Negative Negative    Urobilinogen, UA 0.2 E.U./dL 0.2 - 1.0 E.U./dL   CBC Auto Differential    Specimen: Blood    Result Value Ref Range    WBC 12.70 (H) 3.40 - 10.80 10*3/mm3    RBC 4.64 3.77 - 5.28 10*6/mm3    Hemoglobin 14.1 12.0 - 15.9 g/dL    Hematocrit 41.3 34.0 - 46.6 %    MCV 89.1 79.0 - 97.0 fL    MCH 30.5 26.6 - 33.0 pg    MCHC 34.2 31.5 - 35.7 g/dL    RDW 13.1 12.3 - 15.4 %    RDW-SD 41.6 37.0 - 54.0 fl    MPV 9.1 6.0 - 12.0 fL    Platelets 277 140 - 450 10*3/mm3    Neutrophil % 61.5 42.7 - 76.0 %    Lymphocyte % 27.8 19.6 - 45.3 %    Monocyte % 6.8 5.0 - 12.0 %    Eosinophil % 2.8 0.3 - 6.2 %    Basophil % 1.1 0.0 - 1.5 %    Neutrophils, Absolute 7.80 (H) 1.70 - 7.00 10*3/mm3    Lymphocytes, Absolute 3.50 (H) 0.70 - 3.10 10*3/mm3    Monocytes, Absolute 0.90 0.10 - 0.90 10*3/mm3    Eosinophils, Absolute 0.40 0.00 - 0.40 10*3/mm3    Basophils, Absolute 0.10 0.00 - 0.20 10*3/mm3    nRBC 0.2 0.0 - 0.2 /100 WBC   Gold Top - SST   Result Value Ref Range    Extra Tube Hold for add-ons.    Green Top (Gel)   Result Value Ref Range    Extra Tube Hold for add-ons.    Light Blue Top   Result Value Ref Range    Extra Tube hold for add-on      CT Abdomen Pelvis Without Contrast    Result Date: 10/6/2021  1. Negative for urinary tract stone disease. 2.  Renal parenchymal evaluation is suboptimal with this technique. Small lesion in each kidney is compatible with a cyst. 3. Appendix is not identified. No secondary signs of appendicitis. 4. Moderate stool. 5. Additional findings as reported above.  Electronically Signed By-Dee Dee Garcia MD On:10/6/2021 9:51 PM This report was finalized on [id number] by  Dee Dee Garcia MD.                                      MDM  Number of Diagnoses or Management Options  Diagnosis management comments: Patient has benign physical exam.  There is no evidence of ureteral stone.  Patient has no intra-abdominal pathology noted on CT scan.  There is no evidence of infectious process.  Patient given Toradol with improvement of pain.  She will be discharged with a prescription for Naprosyn.  She will  see MD for recheck.    Risk of Complications, Morbidity, and/or Mortality  Presenting problems: high  Diagnostic procedures: high  Management options: high    Patient Progress  Patient progress: stable      Final diagnoses:   Generalized abdominal pain       ED Disposition  ED Disposition     ED Disposition Condition Comment    Discharge Stable           No follow-up provider specified.       Medication List      New Prescriptions    naproxen 375 MG tablet  Commonly known as: NAPROSYN  Take 1 tablet by mouth 2 (Two) Times a Day As Needed for Moderate Pain .           Where to Get Your Medications      These medications were sent to [facility], [place] - [address] [place] [address] - [phone]  - [phone]   [address] [place] [address] [place] [address]    Phone: [phone]   · naproxen 375 MG tablet          Fuentes Peng MD  10/06/21 3122

## 2021-10-08 ENCOUNTER — TELEPHONE (OUTPATIENT)
Dept: PAIN MEDICINE | Facility: CLINIC | Age: 66
End: 2021-10-08

## 2021-10-08 NOTE — TELEPHONE ENCOUNTER
Provider: DR BEARD    Caller: TOÑITO TREADWELL    Relationship to Patient: SELF    Pharmacy: [facility] IN [place]    Phone Number: [phone]    Reason for Call: PT SAYS HER PERCOCET IS NOT HELPING WANTS TO GO BACK ON LORTAB 10MG. PLEASE CALL HER BACK AT THE NUMBER ABOVE.

## 2021-10-08 NOTE — TELEPHONE ENCOUNTER
She can, but she will have to bring in her remaining Percocet for count and disposal first. Thanks.

## 2021-10-19 ENCOUNTER — OFFICE VISIT (OUTPATIENT)
Dept: PSYCHIATRY | Facility: CLINIC | Age: 66
End: 2021-10-19

## 2021-10-19 DIAGNOSIS — F51.05 INSOMNIA DUE TO MENTAL DISORDER: ICD-10-CM

## 2021-10-19 DIAGNOSIS — F34.1 DYSTHYMIA: Chronic | ICD-10-CM

## 2021-10-19 DIAGNOSIS — F41.1 GENERALIZED ANXIETY DISORDER: Primary | Chronic | ICD-10-CM

## 2021-10-19 PROCEDURE — 99214 OFFICE O/P EST MOD 30 MIN: CPT | Performed by: PHYSICIAN ASSISTANT

## 2021-10-19 RX ORDER — MIRTAZAPINE 15 MG/1
15 TABLET, FILM COATED ORAL NIGHTLY
Qty: 30 TABLET | Refills: 3 | Status: SHIPPED | OUTPATIENT
Start: 2021-10-19 | End: 2022-01-27

## 2021-10-19 RX ORDER — ARIPIPRAZOLE 5 MG/1
5 TABLET ORAL DAILY
Qty: 30 TABLET | Refills: 3 | Status: SHIPPED | OUTPATIENT
Start: 2021-10-19 | End: 2022-03-09 | Stop reason: SDUPTHER

## 2021-10-19 RX ORDER — GABAPENTIN 300 MG/1
300 CAPSULE ORAL 3 TIMES DAILY
Qty: 90 CAPSULE | Refills: 3 | Status: SHIPPED | OUTPATIENT
Start: 2021-10-19 | End: 2022-01-27

## 2021-10-19 RX ORDER — BUPROPION HYDROCHLORIDE 150 MG/1
150 TABLET ORAL EVERY MORNING
Qty: 30 TABLET | Refills: 3 | Status: SHIPPED | OUTPATIENT
Start: 2021-10-19 | End: 2022-01-27
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
## 2021-10-27 ENCOUNTER — OFFICE (AMBULATORY)
Dept: URBAN - METROPOLITAN AREA CLINIC 64 | Facility: CLINIC | Age: 66
End: 2021-10-27

## 2021-10-27 VITALS
HEIGHT: 63 IN | HEART RATE: 104 BPM | SYSTOLIC BLOOD PRESSURE: 143 MMHG | WEIGHT: 187 LBS | DIASTOLIC BLOOD PRESSURE: 72 MMHG

## 2021-10-27 DIAGNOSIS — K59.00 CONSTIPATION, UNSPECIFIED: ICD-10-CM

## 2021-10-27 DIAGNOSIS — R19.7 DIARRHEA, UNSPECIFIED: ICD-10-CM

## 2021-10-27 PROCEDURE — 99214 OFFICE O/P EST MOD 30 MIN: CPT | Performed by: NURSE PRACTITIONER

## 2021-10-27 RX ORDER — HYDROCORTISONE ACETATE 25 MG/1
25 SUPPOSITORY RECTAL
Qty: 14 | Refills: 1 | Status: COMPLETED
Start: 2021-10-27 | End: 2021-10-27

## 2021-11-05 ENCOUNTER — OFFICE VISIT (OUTPATIENT)
Dept: PAIN MEDICINE | Facility: CLINIC | Age: 66
End: 2021-11-05

## 2021-11-05 VITALS
BODY MASS INDEX: 29.51 KG/M2 | HEIGHT: 67 IN | OXYGEN SATURATION: 97 % | WEIGHT: 188 LBS | DIASTOLIC BLOOD PRESSURE: 69 MMHG | HEART RATE: 81 BPM | SYSTOLIC BLOOD PRESSURE: 130 MMHG

## 2021-11-05 DIAGNOSIS — M48.061 SPINAL STENOSIS OF LUMBAR REGION, UNSPECIFIED WHETHER NEUROGENIC CLAUDICATION PRESENT: ICD-10-CM

## 2021-11-05 DIAGNOSIS — M43.16 SPONDYLOLISTHESIS OF LUMBAR REGION: ICD-10-CM

## 2021-11-05 DIAGNOSIS — Z79.899 OTHER LONG TERM (CURRENT) DRUG THERAPY: ICD-10-CM

## 2021-11-05 DIAGNOSIS — M47.816 LUMBAR SPONDYLOSIS: ICD-10-CM

## 2021-11-05 DIAGNOSIS — M54.40 LUMBAGO OF LUMBAR REGION WITH SCIATICA: ICD-10-CM

## 2021-11-05 DIAGNOSIS — M46.96 LUMBAR SPONDYLITIS (HCC): ICD-10-CM

## 2021-11-05 DIAGNOSIS — M54.16 LUMBAR RADICULOPATHY: ICD-10-CM

## 2021-11-05 DIAGNOSIS — M79.18 MYOFASCIAL MUSCLE PAIN: ICD-10-CM

## 2021-11-05 DIAGNOSIS — M79.604 LEG PAIN, BILATERAL: ICD-10-CM

## 2021-11-05 DIAGNOSIS — M54.12 CERVICAL RADICULOPATHY: ICD-10-CM

## 2021-11-05 DIAGNOSIS — Z79.899 HIGH RISK MEDICATION USE: Primary | ICD-10-CM

## 2021-11-05 DIAGNOSIS — M54.40 CHRONIC MIDLINE LOW BACK PAIN WITH SCIATICA, SCIATICA LATERALITY UNSPECIFIED: Primary | ICD-10-CM

## 2021-11-05 DIAGNOSIS — M79.605 LEG PAIN, BILATERAL: ICD-10-CM

## 2021-11-05 DIAGNOSIS — G89.29 CHRONIC MIDLINE LOW BACK PAIN WITH SCIATICA, SCIATICA LATERALITY UNSPECIFIED: Primary | ICD-10-CM

## 2021-11-05 DIAGNOSIS — M54.2 NECK PAIN: ICD-10-CM

## 2021-11-05 PROCEDURE — 99214 OFFICE O/P EST MOD 30 MIN: CPT | Performed by: PHYSICAL MEDICINE & REHABILITATION

## 2021-11-05 RX ORDER — OXYCODONE HYDROCHLORIDE 15 MG/1
15 TABLET ORAL EVERY 6 HOURS PRN
Qty: 120 TABLET | Refills: 0 | Status: SHIPPED | OUTPATIENT
Start: 2021-11-05 | End: 2022-03-22

## 2021-11-05 RX ORDER — OXYCODONE HYDROCHLORIDE 15 MG/1
15 TABLET ORAL EVERY 6 HOURS PRN
Qty: 120 TABLET | Refills: 0 | Status: SHIPPED | OUTPATIENT
Start: 2021-11-05 | End: 2021-11-23 | Stop reason: SDUPTHER

## 2021-11-05 NOTE — PROGRESS NOTES
Subjective   Marlena Avilez is a 66 y.o. female.     Low back and yen. legs pain,   Lumbar Back Pain with BLLE Pain,  RLE Numbness and RLE Weakness,  Lumbar Back Pain is increased with prolonged sitting.  Patient planned Lumbar Epidural #1 with Dr. Pleitez, reports very little relief with LESIs. Pain is 9/10 at best, aching, throbbing, worse with bending and standing, interferes with ADLs, activity, failed injections, meds. MRI L-spine with laminectomy and fusion. Taking Zohydro 15mg BID with Dr. Pleitez with poor relief, Norco 10mg TID with PCP with poor relief, had good relief in past with Percocet 10mg TID prn, restarted, but less relief than Hydrocodone, worsening pain. Current patient of this clinic. Rotated to Norco 10mg QID prn, working well initially but pain worsening, rotated to MS-Contin 15mg TID, then Percocet 10mg QID prn, pain worsening. C/o constipation controlled by fiber supplement.        The following portions of the patient's history were reviewed and updated as appropriate: allergies, current medications, past family history, past medical history, past social history, past surgical history and problem list.    Review of Systems   Constitutional: Negative for chills, fatigue and fever.   HENT: Positive for hearing loss. Negative for trouble swallowing.    Eyes: Positive for visual disturbance.   Respiratory: Positive for shortness of breath.    Cardiovascular: Negative for chest pain.   Gastrointestinal: Negative for abdominal pain, constipation, diarrhea, nausea and vomiting.   Genitourinary: Negative for urinary incontinence.   Musculoskeletal: Negative for arthralgias, back pain, joint swelling, myalgias and neck pain.   Neurological: Positive for headache. Negative for dizziness, weakness and numbness.       Objective   Physical Exam   Constitutional: She is oriented to person, place, and time. She appears well-developed and well-nourished.   HENT:   Head: Normocephalic and atraumatic.   Eyes:  Pupils are equal, round, and reactive to light.   Cardiovascular: Normal rate, regular rhythm and normal heart sounds.   Pulmonary/Chest: Breath sounds normal.   Abdominal: Soft. Bowel sounds are normal. She exhibits no distension. There is no abdominal tenderness.   Musculoskeletal:      Comments: Strength 4/5 globally   Neurological: She is alert and oriented to person, place, and time. She has normal reflexes. She displays normal reflexes. No sensory deficit.   Psychiatric: Her behavior is normal. Thought content normal.         Assessment/Plan   Diagnoses and all orders for this visit:    1. Chronic midline low back pain with sciatica, sciatica laterality unspecified (Primary)    2. Leg pain, bilateral    3. Lumbago of lumbar region with sciatica    4. Lumbar radiculopathy    5. Lumbar spondylosis    6. Lumbar spondylitis (HCC)    7. Cervical radiculopathy    8. Neck pain    9. Myofascial muscle pain    10. Other long term (current) drug therapy    11. Spinal stenosis of lumbar region, unspecified whether neurogenic claudication present    12. Spondylolisthesis of lumbar region        Inspect reviewed, in order. UDS 11/20/20 in order.  Stopped Zohydro, Norco. Failed Percocet 10mg TID prn which had worked well in past   Began Norco 10mg QID prn, was working well, tolerant. Completely failed MS-Contin 15mg TID, denies allergy to morphine. Restarted Percocet, then Norco per request, poor relief.  Restarted Percocet 10mg QID prn, best relief so far, increase to Oxycodone 15mg QID prn, will not increase further.  Increase to Zanaflex 6mg TID prn for worsening muscle pain.  Failed fiber supplement, OTC Colace, Miralax. Began Amitiza 24mcg BID for worsening OIC.  RTC 3 months for f/u.    ADD: UDS (+) for cocaine metabolite, small amount and another physician in this clinic had what appears to be a false positive for cocaine at same time, will re-run sample.

## 2021-11-11 ENCOUNTER — TELEPHONE (OUTPATIENT)
Dept: PAIN MEDICINE | Facility: CLINIC | Age: 66
End: 2021-11-11

## 2021-11-11 NOTE — TELEPHONE ENCOUNTER
She is calling wanting to know if you can give her 10 more Percocet until she can until she gets her next refill on the 24th?

## 2021-11-12 NOTE — TELEPHONE ENCOUNTER
I don't think the pharmacy would be OK with that, and that isn't something we discussed at our appt, which is when we should be making medication changes. She will have to wait until her next refill.

## 2021-11-12 NOTE — TELEPHONE ENCOUNTER
UNABLE TO WARM TRANSFER    Caller: Marlena Avilez    Relationship to patient: Self    Best call back [phone]    Patient is needing:PATIENT CALLED AND IS RETURNING RAFAT'S CALL

## 2021-11-16 ENCOUNTER — TRANSCRIBE ORDERS (OUTPATIENT)
Dept: ADMINISTRATIVE | Facility: HOSPITAL | Age: 66
End: 2021-11-16

## 2021-11-16 DIAGNOSIS — R10.9 FLANK PAIN: ICD-10-CM

## 2021-11-16 DIAGNOSIS — N17.9 ACUTE RENAL FAILURE, UNSPECIFIED ACUTE RENAL FAILURE TYPE (HCC): Primary | ICD-10-CM

## 2021-11-18 ENCOUNTER — OFFICE (AMBULATORY)
Dept: URBAN - METROPOLITAN AREA CLINIC 64 | Facility: CLINIC | Age: 66
End: 2021-11-18

## 2021-11-18 VITALS
SYSTOLIC BLOOD PRESSURE: 122 MMHG | HEART RATE: 84 BPM | DIASTOLIC BLOOD PRESSURE: 82 MMHG | WEIGHT: 184 LBS | HEIGHT: 63 IN

## 2021-11-18 DIAGNOSIS — R13.10 DYSPHAGIA, UNSPECIFIED: ICD-10-CM

## 2021-11-18 DIAGNOSIS — K59.00 CONSTIPATION, UNSPECIFIED: ICD-10-CM

## 2021-11-18 DIAGNOSIS — R10.31 RIGHT LOWER QUADRANT PAIN: ICD-10-CM

## 2021-11-18 PROCEDURE — 99214 OFFICE O/P EST MOD 30 MIN: CPT | Performed by: NURSE PRACTITIONER

## 2021-11-23 ENCOUNTER — APPOINTMENT (OUTPATIENT)
Dept: ULTRASOUND IMAGING | Facility: HOSPITAL | Age: 66
End: 2021-11-23

## 2021-11-23 DIAGNOSIS — M54.40 CHRONIC MIDLINE LOW BACK PAIN WITH SCIATICA, SCIATICA LATERALITY UNSPECIFIED: ICD-10-CM

## 2021-11-23 DIAGNOSIS — G89.29 CHRONIC MIDLINE LOW BACK PAIN WITH SCIATICA, SCIATICA LATERALITY UNSPECIFIED: ICD-10-CM

## 2021-11-23 RX ORDER — OXYCODONE HYDROCHLORIDE 15 MG/1
15 TABLET ORAL EVERY 6 HOURS PRN
Qty: 120 TABLET | Refills: 0 | Status: SHIPPED | OUTPATIENT
Start: 2021-11-23 | End: 2022-03-22

## 2021-11-24 ENCOUNTER — HOSPITAL ENCOUNTER (OUTPATIENT)
Dept: ULTRASOUND IMAGING | Facility: HOSPITAL | Age: 66
Discharge: HOME | End: 2021-11-24
Admitting: HOSPITALIST

## 2021-11-24 DIAGNOSIS — R10.9 FLANK PAIN: ICD-10-CM

## 2021-11-24 DIAGNOSIS — N17.9 ACUTE RENAL FAILURE, UNSPECIFIED ACUTE RENAL FAILURE TYPE (HCC): ICD-10-CM

## 2021-11-24 PROCEDURE — 76775 US EXAM ABDO BACK WALL LIM: CPT

## 2021-11-29 RX ORDER — TOPIRAMATE 25 MG/1
TABLET ORAL
Qty: 60 TABLET | Refills: 2 | Status: SHIPPED | OUTPATIENT
Start: 2021-11-29 | End: 2022-02-24

## 2021-11-30 ENCOUNTER — APPOINTMENT (OUTPATIENT)
Dept: CARDIOLOGY | Facility: HOSPITAL | Age: 66
End: 2021-11-30

## 2021-12-08 ENCOUNTER — TELEPHONE (OUTPATIENT)
Dept: PSYCHIATRY | Facility: CLINIC | Age: 66
End: 2021-12-08

## 2021-12-08 DIAGNOSIS — F41.1 GENERALIZED ANXIETY DISORDER: Primary | ICD-10-CM

## 2021-12-08 RX ORDER — ALPRAZOLAM 0.5 MG/1
0.5 TABLET ORAL DAILY PRN
Qty: 30 TABLET | Refills: 0 | Status: SHIPPED | OUTPATIENT
Start: 2021-12-08 | End: 2022-01-17

## 2021-12-08 RX ORDER — VENLAFAXINE HYDROCHLORIDE 225 MG/1
TABLET, EXTENDED RELEASE ORAL
Qty: 30 TABLET | Refills: 1 | OUTPATIENT
Start: 2021-12-08

## 2021-12-08 RX ORDER — CARIPRAZINE 1.5 MG/1
CAPSULE, GELATIN COATED ORAL
Qty: 30 CAPSULE | Refills: 1 | OUTPATIENT
Start: 2021-12-08

## 2021-12-08 NOTE — TELEPHONE ENCOUNTER
LVM to call us back to ask her about her pill bottles, and to let her know she can only take 0.5mg of Xanax as needed since she is also on pain meds.

## 2021-12-08 NOTE — TELEPHONE ENCOUNTER
She is on pain medication, so I will give her 1/day only as needed for a panic attack with no refills

## 2021-12-08 NOTE — TELEPHONE ENCOUNTER
I received a refill request from the pharmacy for Vraylar 1.5mg and Effexor XR 225mg but during her last visit, she was taking Abilify, not Vraylar and Effexor 150mg tabs, not 225mg .  Please call her and go through all of her meds from the bottles she has at home and clarify what she is taking.  I am refusing these refills until confirmed.

## 2021-12-08 NOTE — TELEPHONE ENCOUNTER
Marlena call to report panic/anxiety attacks during the holiday season. She says it happens every year. Requesting something for them. She states she has had Xanax in the past .

## 2021-12-16 ENCOUNTER — TELEPHONE (OUTPATIENT)
Dept: PSYCHIATRY | Facility: CLINIC | Age: 66
End: 2021-12-16

## 2021-12-16 DIAGNOSIS — F34.1 DYSTHYMIA: Chronic | ICD-10-CM

## 2021-12-16 RX ORDER — VENLAFAXINE HYDROCHLORIDE 150 MG/1
300 CAPSULE, EXTENDED RELEASE ORAL DAILY
Qty: 60 CAPSULE | Refills: 5 | Status: SHIPPED | OUTPATIENT
Start: 2021-12-16 | End: 2022-05-23

## 2021-12-16 NOTE — TELEPHONE ENCOUNTER
Pt says she quit taking Wellbutrin a long time ago, but needs a new rx for Effexor 150mg.  I couldn't find this on her med list.  It doesn't look like she is still taking it.  She would like it sent to [facility].

## 2022-01-14 DIAGNOSIS — F41.1 GENERALIZED ANXIETY DISORDER: ICD-10-CM

## 2022-01-17 RX ORDER — ALPRAZOLAM 0.5 MG/1
TABLET ORAL
Qty: 30 TABLET | Refills: 0 | Status: SHIPPED | OUTPATIENT
Start: 2022-01-17 | End: 2022-02-16

## 2022-01-27 RX ORDER — BUPROPION HYDROCHLORIDE 150 MG/1
TABLET ORAL
Qty: 30 TABLET | Refills: 2 | Status: SHIPPED | OUTPATIENT
Start: 2022-01-27 | End: 2022-04-25

## 2022-01-27 RX ORDER — MIRTAZAPINE 15 MG/1
TABLET, FILM COATED ORAL
Qty: 30 TABLET | Refills: 2 | Status: SHIPPED | OUTPATIENT
Start: 2022-01-27 | End: 2022-03-09

## 2022-01-27 RX ORDER — GABAPENTIN 300 MG/1
CAPSULE ORAL
Qty: 90 CAPSULE | Refills: 2 | Status: SHIPPED | OUTPATIENT
Start: 2022-01-27 | End: 2022-02-10

## 2022-01-28 ENCOUNTER — OFFICE (AMBULATORY)
Dept: URBAN - METROPOLITAN AREA CLINIC 64 | Facility: CLINIC | Age: 67
End: 2022-01-28

## 2022-01-28 VITALS
DIASTOLIC BLOOD PRESSURE: 74 MMHG | SYSTOLIC BLOOD PRESSURE: 116 MMHG | WEIGHT: 187 LBS | HEART RATE: 77 BPM | HEIGHT: 63 IN

## 2022-01-28 DIAGNOSIS — K92.1 MELENA: ICD-10-CM

## 2022-01-28 PROCEDURE — 99214 OFFICE O/P EST MOD 30 MIN: CPT | Performed by: NURSE PRACTITIONER

## 2022-01-28 RX ORDER — PROMETHAZINE HYDROCHLORIDE 25 MG/1
TABLET ORAL
Qty: 30 | Refills: 6 | Status: ACTIVE
Start: 2022-01-28

## 2022-02-01 ENCOUNTER — TELEPHONE (OUTPATIENT)
Dept: PAIN MEDICINE | Facility: CLINIC | Age: 67
End: 2022-02-01

## 2022-02-01 NOTE — TELEPHONE ENCOUNTER
Caller: TOÑITO TREADWELL    Relationship to patient: SELF    Best call back number: [phone]    Chief complaint: FOLLOW UP    Type of visit: FOLLOW UP    Requested date: SOONER AVAILABILITY    If rescheduling, when is the original appointment: 02-    Additional notes: PATIENT IS EXPERIENCING PAIN.

## 2022-02-01 NOTE — TELEPHONE ENCOUNTER
Caller: TOÑITO TREADWELL    Relationship to patient: SELF    Best call back number: [phone]    Patient is needing: PATIENT IS SAYING THE OXYCODONE IS NOT EFFECTIVE AND WANTS TO DISCUSS  ALTERNATIVES.

## 2022-02-10 ENCOUNTER — TELEPHONE (OUTPATIENT)
Dept: PAIN MEDICINE | Facility: CLINIC | Age: 67
End: 2022-02-10

## 2022-02-10 ENCOUNTER — OFFICE VISIT (OUTPATIENT)
Dept: PAIN MEDICINE | Facility: CLINIC | Age: 67
End: 2022-02-10

## 2022-02-10 VITALS
OXYGEN SATURATION: 97 % | TEMPERATURE: 97.6 F | HEIGHT: 64 IN | RESPIRATION RATE: 18 BRPM | DIASTOLIC BLOOD PRESSURE: 68 MMHG | HEART RATE: 76 BPM | WEIGHT: 188 LBS | SYSTOLIC BLOOD PRESSURE: 118 MMHG | BODY MASS INDEX: 32.1 KG/M2

## 2022-02-10 DIAGNOSIS — Z79.899 OTHER LONG TERM (CURRENT) DRUG THERAPY: ICD-10-CM

## 2022-02-10 DIAGNOSIS — M79.18 MYOFASCIAL MUSCLE PAIN: ICD-10-CM

## 2022-02-10 DIAGNOSIS — M48.061 SPINAL STENOSIS OF LUMBAR REGION, UNSPECIFIED WHETHER NEUROGENIC CLAUDICATION PRESENT: ICD-10-CM

## 2022-02-10 DIAGNOSIS — M54.40 CHRONIC MIDLINE LOW BACK PAIN WITH SCIATICA, SCIATICA LATERALITY UNSPECIFIED: Primary | ICD-10-CM

## 2022-02-10 DIAGNOSIS — G89.29 CHRONIC MIDLINE LOW BACK PAIN WITH SCIATICA, SCIATICA LATERALITY UNSPECIFIED: Primary | ICD-10-CM

## 2022-02-10 DIAGNOSIS — M54.16 LUMBAR RADICULOPATHY: ICD-10-CM

## 2022-02-10 DIAGNOSIS — M54.12 CERVICAL RADICULOPATHY: ICD-10-CM

## 2022-02-10 DIAGNOSIS — M54.2 NECK PAIN: ICD-10-CM

## 2022-02-10 DIAGNOSIS — K59.03 CONSTIPATION DUE TO PAIN MEDICATION: ICD-10-CM

## 2022-02-10 DIAGNOSIS — M25.562 ARTHRALGIA OF LEFT KNEE: ICD-10-CM

## 2022-02-10 DIAGNOSIS — Z79.899 HIGH RISK MEDICATION USE: Primary | ICD-10-CM

## 2022-02-10 DIAGNOSIS — M79.7 FIBROMYOSITIS: ICD-10-CM

## 2022-02-10 DIAGNOSIS — M47.816 LUMBAR SPONDYLOSIS: ICD-10-CM

## 2022-02-10 PROCEDURE — 99214 OFFICE O/P EST MOD 30 MIN: CPT | Performed by: PHYSICAL MEDICINE & REHABILITATION

## 2022-02-10 RX ORDER — PREGABALIN 100 MG/1
1 CAPSULE ORAL 2 TIMES DAILY PRN
COMMUNITY
Start: 2021-10-04 | End: 2022-06-09 | Stop reason: DRUGHIGH

## 2022-02-10 RX ORDER — CONJUGATED ESTROGENS 0.62 MG/G
CREAM VAGINAL
COMMUNITY
Start: 2022-01-11 | End: 2022-12-05 | Stop reason: ALTCHOICE

## 2022-02-10 RX ORDER — MORPHINE SULFATE 15 MG/1
15 TABLET ORAL EVERY 6 HOURS PRN
Qty: 120 TABLET | Refills: 0 | Status: SHIPPED | OUTPATIENT
Start: 2022-02-10 | End: 2022-06-09

## 2022-02-10 RX ORDER — OMEPRAZOLE 40 MG/1
1 CAPSULE, DELAYED RELEASE ORAL DAILY
COMMUNITY
Start: 2021-12-14

## 2022-02-10 RX ORDER — PRAVASTATIN SODIUM 40 MG
1 TABLET ORAL NIGHTLY
COMMUNITY
Start: 2021-12-24

## 2022-02-10 RX ORDER — MONTELUKAST SODIUM 4 MG/1
1 TABLET, CHEWABLE ORAL
COMMUNITY
Start: 2022-01-31 | End: 2022-12-05

## 2022-02-10 NOTE — TELEPHONE ENCOUNTER
AT CHECK OUT PATIENT STATED THAT SHE FORGOT TO TALK TO YOU ABOUT GETTING EPIDURALS IN THE LOWER BACK AGAIN

## 2022-02-10 NOTE — TELEPHONE ENCOUNTER
PER INSURANCE GUIDELINES THESE HAVE TO BE THREE MONTHS APART DO YOU STILL WANT TO SCHEDULE ALL THREE?

## 2022-02-10 NOTE — PROGRESS NOTES
Subjective   Marlena Avilez is a 67 y.o. female.     Low back and yen. legs pain,   Lumbar Back Pain with BLLE Pain,  RLE Numbness and RLE Weakness,  Lumbar Back Pain is increased with prolonged sitting.  Patient planned Lumbar Epidural #1 with Dr. Pleitez, reports very little relief with LESIs. Pain is 9/10 at best, aching, throbbing, worse with bending and standing, interferes with ADLs, activity, failed injections, meds. MRI L-spine with laminectomy and fusion. Taking Zohydro 15mg BID with Dr. Pleitez with poor relief, Norco 10mg TID with PCP with poor relief, had good relief in past with Percocet 10mg TID prn, restarted, but less relief than Hydrocodone, worsening pain. Current patient of this clinic. Rotated to Norco 10mg QID prn, working well initially but pain worsening, rotated to MS-Contin 15mg TID, then Percocet 10mg QID prn, pain worsening. C/o constipation controlled by fiber supplement.        The following portions of the patient's history were reviewed and updated as appropriate: allergies, current medications, past family history, past medical history, past social history, past surgical history and problem list.    Review of Systems   Constitutional: Negative for chills, fatigue and fever.   HENT: Positive for hearing loss. Negative for trouble swallowing.    Eyes: Positive for visual disturbance.   Respiratory: Positive for shortness of breath.    Cardiovascular: Negative for chest pain.   Gastrointestinal: Negative for abdominal pain, constipation, diarrhea, nausea and vomiting.   Genitourinary: Negative for urinary incontinence.   Musculoskeletal: Negative for arthralgias, back pain, joint swelling, myalgias and neck pain.   Neurological: Positive for headache. Negative for dizziness, weakness and numbness.       Objective   Physical Exam   Constitutional: She is oriented to person, place, and time. She appears well-developed and well-nourished.   HENT:   Head: Normocephalic and atraumatic.   Eyes:  Pupils are equal, round, and reactive to light.   Cardiovascular: Normal rate, regular rhythm and normal heart sounds.   Pulmonary/Chest: Breath sounds normal.   Abdominal: Soft. Bowel sounds are normal. She exhibits no distension. There is no abdominal tenderness.   Musculoskeletal:      Comments: Strength 4/5 globally   Neurological: She is alert and oriented to person, place, and time. She has normal reflexes. She displays normal reflexes. No sensory deficit.   Psychiatric: Her behavior is normal. Thought content normal.         Assessment/Plan   Diagnoses and all orders for this visit:    1. Chronic midline low back pain with sciatica, sciatica laterality unspecified (Primary)    2. Lumbar radiculopathy    3. Lumbar spondylosis    4. Myofascial muscle pain    5. Neck pain    6. Arthralgia of left knee    7. Cervical radiculopathy    8. Constipation due to pain medication    9. Fibromyositis    10. Other long term (current) drug therapy    11. Spinal stenosis of lumbar region, unspecified whether neurogenic claudication present        Inspect reviewed, in order. UDS 11/20/20 in order. New UDS (+) for cocaine metabolite, small amount and another physician in this clinic had what appears to be a false positive for cocaine at same time, cannot re-run sample, will repeat today.  Stopped Zohydro, Norco. Failed Percocet 10mg TID prn which had worked well in past   Began Norco 10mg QID prn, was working well, tolerant. Completely failed MS-Contin 15mg TID, denies allergy to morphine. Restarted Percocet, then Norco per request, poor relief.  Restarted Percocet 10mg QID prn, best relief so far, increased to Oxycodone 15mg QID prn, no relief. Begin MS-IR 15mg QID prn.  Increased to Zanaflex 6mg TID prn for worsening muscle pain.  Failed fiber supplement, OTC Colace, Miralax. Began Amitiza 24mcg BID for worsening OIC.  Pt requested ESIs at checkout, will schedule 3 b/l L4 TFESIs as she had fusion at this level with remaining  neuroforaminal stenosis.   RTC for TFESIs then in 3 months for f/u.    ADD: UDS again (+) for cocaine metabolite, this time a significant amount, will discharge from clinic.

## 2022-02-15 ENCOUNTER — TELEPHONE (OUTPATIENT)
Dept: PSYCHIATRY | Facility: CLINIC | Age: 67
End: 2022-02-15

## 2022-02-15 RX ORDER — HYDROXYZINE PAMOATE 25 MG/1
25 CAPSULE ORAL 3 TIMES DAILY PRN
Qty: 90 CAPSULE | Refills: 2 | Status: SHIPPED | OUTPATIENT
Start: 2022-02-15 | End: 2022-03-09

## 2022-02-15 NOTE — TELEPHONE ENCOUNTER
I cannot do that because she is on Morphine and oxycodone for pain.  I can add Vistaril TID as needed for anxiety to use instead and she still has the one per day of Xanax for a true panic attack

## 2022-02-15 NOTE — TELEPHONE ENCOUNTER
Pt would like a temporary increase in her xanax dose to TID because she is having panic attacks due to her daughter's rapid weight loss and refusal to go to the doctor.  Please advise.

## 2022-02-16 DIAGNOSIS — F41.1 GENERALIZED ANXIETY DISORDER: ICD-10-CM

## 2022-02-16 RX ORDER — ALPRAZOLAM 0.5 MG/1
TABLET ORAL
Qty: 30 TABLET | Refills: 0 | Status: SHIPPED | OUTPATIENT
Start: 2022-02-16 | End: 2022-04-11

## 2022-02-22 ENCOUNTER — TELEPHONE (OUTPATIENT)
Dept: PAIN MEDICINE | Facility: CLINIC | Age: 67
End: 2022-02-22

## 2022-02-22 RX ORDER — OXYCODONE AND ACETAMINOPHEN 10; 325 MG/1; MG/1
TABLET ORAL
Qty: 70 TABLET | Refills: 0 | Status: SHIPPED | OUTPATIENT
Start: 2022-02-22 | End: 2022-03-22

## 2022-02-22 NOTE — TELEPHONE ENCOUNTER
Yes, I will send in an opioid taper, but we can still perform the injections, thanks. Inspect reviewed.

## 2022-02-22 NOTE — TELEPHONE ENCOUNTER
Spoke with patient about UDS and discharge. She is wondering if you can send in a taper of something since she can not take the Morphine, and can she stay a patient for injections only?

## 2022-02-24 RX ORDER — TOPIRAMATE 25 MG/1
TABLET ORAL
Qty: 60 TABLET | Refills: 1 | Status: SHIPPED | OUTPATIENT
Start: 2022-02-24 | End: 2022-03-09

## 2022-03-01 ENCOUNTER — APPOINTMENT (OUTPATIENT)
Dept: PAIN MEDICINE | Facility: HOSPITAL | Age: 67
End: 2022-03-01

## 2022-03-03 ENCOUNTER — HOSPITAL ENCOUNTER (OUTPATIENT)
Dept: PAIN MEDICINE | Facility: HOSPITAL | Age: 67
Discharge: HOME | End: 2022-03-03

## 2022-03-03 ENCOUNTER — TELEPHONE (OUTPATIENT)
Dept: PAIN MEDICINE | Facility: CLINIC | Age: 67
End: 2022-03-03

## 2022-03-03 DIAGNOSIS — R52 PAIN: ICD-10-CM

## 2022-03-03 RX ORDER — LINAGLIPTIN 5 MG/1
5 TABLET, FILM COATED ORAL DAILY
COMMUNITY
Start: 2022-02-24

## 2022-03-03 RX ORDER — HYDROXYZINE HYDROCHLORIDE 25 MG/1
TABLET, FILM COATED ORAL
COMMUNITY
Start: 2022-02-16 | End: 2022-03-03

## 2022-03-03 RX ORDER — ROPINIROLE 0.5 MG/1
0.5 TABLET, FILM COATED ORAL NIGHTLY
COMMUNITY
Start: 2022-02-25 | End: 2022-06-09

## 2022-03-03 RX ORDER — PANTOPRAZOLE SODIUM 40 MG/1
TABLET, DELAYED RELEASE ORAL
COMMUNITY
Start: 2022-02-25 | End: 2022-03-03

## 2022-03-03 RX ORDER — ISOSORBIDE MONONITRATE 30 MG/1
30 TABLET, EXTENDED RELEASE ORAL DAILY
COMMUNITY
Start: 2022-02-25

## 2022-03-03 RX ORDER — CEPHALEXIN 500 MG/1
CAPSULE ORAL
COMMUNITY
Start: 2022-02-25 | End: 2022-06-09

## 2022-03-03 NOTE — TELEPHONE ENCOUNTER
"Caller: Marlena Avilez    Relationship to patient: Self    Best call back number: [phone]    Patient is needing: CALLBACK TO R/S 3.3.22 INJECTION.  HAS SOMETHING TO DO WITH ANTIBIOTICS SHE IS CURRENTLY TAKING, PATIENT DIFFICULT TO UNDERSTAND.  PATIENT ALSO WANTS TO BE R/S WITHIN 2 WEEKS SINCE SHE IS \"REALLY HURTING\"        UNABLE TO WARM TRANSFER  "

## 2022-03-09 ENCOUNTER — OFFICE VISIT (OUTPATIENT)
Dept: PSYCHIATRY | Facility: CLINIC | Age: 67
End: 2022-03-09

## 2022-03-09 DIAGNOSIS — F51.05 INSOMNIA DUE TO MENTAL DISORDER: ICD-10-CM

## 2022-03-09 DIAGNOSIS — F34.1 DYSTHYMIA: Primary | Chronic | ICD-10-CM

## 2022-03-09 DIAGNOSIS — F41.1 GENERALIZED ANXIETY DISORDER: Chronic | ICD-10-CM

## 2022-03-09 PROCEDURE — 99214 OFFICE O/P EST MOD 30 MIN: CPT | Performed by: PHYSICIAN ASSISTANT

## 2022-03-09 RX ORDER — HYDROXYZINE PAMOATE 50 MG/1
CAPSULE ORAL
Qty: 120 CAPSULE | Refills: 3 | Status: SHIPPED | OUTPATIENT
Start: 2022-03-09 | End: 2022-12-05 | Stop reason: ALTCHOICE

## 2022-03-09 RX ORDER — ARIPIPRAZOLE 5 MG/1
5 TABLET ORAL DAILY
Qty: 30 TABLET | Refills: 5 | Status: SHIPPED | OUTPATIENT
Start: 2022-03-09 | End: 2022-06-09
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
## 2022-03-15 ENCOUNTER — OFFICE (AMBULATORY)
Dept: URBAN - METROPOLITAN AREA CLINIC 64 | Facility: CLINIC | Age: 67
End: 2022-03-15

## 2022-03-15 VITALS
HEIGHT: 63 IN | DIASTOLIC BLOOD PRESSURE: 84 MMHG | WEIGHT: 188 LBS | HEART RATE: 88 BPM | SYSTOLIC BLOOD PRESSURE: 141 MMHG

## 2022-03-15 DIAGNOSIS — K92.1 MELENA: ICD-10-CM

## 2022-03-15 PROCEDURE — 99214 OFFICE O/P EST MOD 30 MIN: CPT | Performed by: NURSE PRACTITIONER

## 2022-03-15 RX ORDER — PRUCALOPRIDE 2 MG/1
2 TABLET, FILM COATED ORAL
Qty: 30 | Refills: 11 | Status: COMPLETED
Start: 2022-03-15 | End: 2023-04-06

## 2022-03-22 ENCOUNTER — HOSPITAL ENCOUNTER (OUTPATIENT)
Dept: PAIN MEDICINE | Facility: HOSPITAL | Age: 67
Discharge: HOME | End: 2022-03-22

## 2022-03-22 VITALS
HEIGHT: 64 IN | DIASTOLIC BLOOD PRESSURE: 69 MMHG | BODY MASS INDEX: 30.73 KG/M2 | WEIGHT: 180 LBS | OXYGEN SATURATION: 98 % | HEART RATE: 72 BPM | SYSTOLIC BLOOD PRESSURE: 135 MMHG | RESPIRATION RATE: 16 BRPM | TEMPERATURE: 97.3 F

## 2022-03-22 DIAGNOSIS — M54.40 CHRONIC MIDLINE LOW BACK PAIN WITH SCIATICA, SCIATICA LATERALITY UNSPECIFIED: Primary | ICD-10-CM

## 2022-03-22 DIAGNOSIS — M54.16 LUMBAR RADICULOPATHY: ICD-10-CM

## 2022-03-22 DIAGNOSIS — R52 PAIN: ICD-10-CM

## 2022-03-22 DIAGNOSIS — M48.061 SPINAL STENOSIS OF LUMBAR REGION, UNSPECIFIED WHETHER NEUROGENIC CLAUDICATION PRESENT: ICD-10-CM

## 2022-03-22 DIAGNOSIS — G89.29 CHRONIC MIDLINE LOW BACK PAIN WITH SCIATICA, SCIATICA LATERALITY UNSPECIFIED: Primary | ICD-10-CM

## 2022-03-22 PROCEDURE — 77003 FLUOROGUIDE FOR SPINE INJECT: CPT

## 2022-03-22 PROCEDURE — 64483 NJX AA&/STRD TFRM EPI L/S 1: CPT | Performed by: PHYSICAL MEDICINE & REHABILITATION

## 2022-03-22 PROCEDURE — 25010000002 DEXAMETHASONE SODIUM PHOSPHATE 10 MG/ML SOLUTION: Performed by: PHYSICAL MEDICINE & REHABILITATION

## 2022-03-22 RX ORDER — DEXAMETHASONE SODIUM PHOSPHATE 10 MG/ML
10 INJECTION, SOLUTION INTRAMUSCULAR; INTRAVENOUS ONCE
Status: COMPLETED | OUTPATIENT
Start: 2022-03-22 | End: 2022-03-22

## 2022-03-22 RX ORDER — PREGABALIN 150 MG/1
150 CAPSULE ORAL 2 TIMES DAILY
COMMUNITY
Start: 2022-03-17 | End: 2022-06-09 | Stop reason: DRUGHIGH

## 2022-03-22 RX ORDER — SORBITOL SOLUTION 70 %
SOLUTION, ORAL MISCELLANEOUS SEE ADMIN INSTRUCTIONS
COMMUNITY
Start: 2022-03-05

## 2022-03-22 RX ORDER — LIDOCAINE HYDROCHLORIDE 10 MG/ML
5 INJECTION, SOLUTION EPIDURAL; INFILTRATION; INTRACAUDAL; PERINEURAL ONCE
Status: COMPLETED | OUTPATIENT
Start: 2022-03-22 | End: 2022-03-22

## 2022-03-22 RX ADMIN — DEXAMETHASONE SODIUM PHOSPHATE 10 MG: 10 INJECTION, SOLUTION INTRAMUSCULAR; INTRAVENOUS at 09:58

## 2022-03-22 RX ADMIN — LIDOCAINE HYDROCHLORIDE 5 ML: 10 INJECTION, SOLUTION EPIDURAL; INFILTRATION; INTRACAUDAL; PERINEURAL at 09:57

## 2022-03-22 NOTE — PROCEDURES
Procedures     Low back and yen. legs pain,   Lumbar Back Pain with BLLE Pain,  RLE Numbness and RLE Weakness,  Lumbar Back Pain is increased with prolonged sitting.  Patient planned Lumbar Epidural #1 with Dr. Pleitez, reports very little relief with LESIs. Pain is 9/10 at best, aching, throbbing, worse with bending and standing, interferes with ADLs, activity, failed injections, meds. MRI L-spine with laminectomy and fusion. Taking Zohydro 15mg BID with Dr. Pleitez with poor relief, Norco 10mg TID with PCP with poor relief, had good relief in past with Percocet 10mg TID prn, restarted, but less relief than Hydrocodone, worsening pain. Current patient of this clinic. Rotated to Norco 10mg QID prn, working well initially but pain worsening, rotated to MS-Contin 15mg TID, then Percocet 10mg QID prn, pain worsening. C/o constipation controlled by fiber supplement.     Inspect reviewed, in order. UDS 11/20/20 in order. New UDS (+) for cocaine metabolite, small amount and another physician in this clinic had what appears to be a false positive for cocaine at same time, cannot re-run sample, repeated with significant amount of cocaine metabolite again, will stop opioids.  Stopped Zohydro, Norco. Failed Percocet 10mg TID prn which had worked well in past   Began Norco 10mg QID prn, was working well, tolerant. Completely failed MS-Contin 15mg TID, denies allergy to morphine. Restarted Percocet, then Norco per request, poor relief.  Restarted Percocet 10mg QID prn, best relief so far, increased to Oxycodone 15mg QID prn, no relief. Began MS-IR 15mg QID prn, tapered off.  Increased to Zanaflex 6mg TID prn for worsening muscle pain.  Failed fiber supplement, OTC Colace, Miralax. Began Amitiza 24mcg BID for worsening OIC.  Pt requested ESIs at checkout, will perform b/l L4 TFESIs as she had fusion at this level with remaining neuroforaminal stenosis. Denies current infection or ABX, anticoagulation, has allergy to contrast  dye.  RTC in 3 months to repeat TFESIs.      Lumbar Transforaminal Epidural Steroid Injection    PREOPERATIVE DIAGNOSIS: Lumbar spinal stenosis    POSTOPERATIVE DIAGNOSIS: Lumbar spinal stenosis    PROCEDURE PERFORMED: Transforaminal Epidural, bilateral L4    The patient presents with a history of  lumbar degenerative disc disease with stenosis. The patient presents today for a [ transforaminal epidural ] at bilateral L4.  This is the [ first ] procedure. The patient understands the risks and benefits of the procedure and wishes to proceed. The patient was seen in the preoperative area.  Patient's consent was obtained and updated.  Vitals were taken.  Patient was then brought to the procedure suite and placed in a prone position. The appropriate anatomic area was widely prepped with Chloroprep and draped in a sterile fashion.  Under fluoroscopic guidance using oblique view, a 25 guage curved tip spinal needle  was passed through skin anesthetized with 1% Lidocaine without epinephrine. The needle tip was advanced to the inferior medial aspect of the transverse process and carefully walked into the neuroforamin.  At no time were parathesias elicited. Preservative free contrast not used due to allergy. At this point [ 2.5 ] mL of a solution containing [ Dexamethasone 10mg and Lidocaine PF 1% 4ml ] were injected. The patient reported no discomfort with injection. The fluoroscope was repositioned to right oblique view. The procedure was repeated in all respects at the right L4-L5 neuroforamen.  A sterile dressing was placed over the puncture sites.    The patient tolerated the procedure with [ no complications ]. They were then brought to the post procedure area where they recovered nicely.    Discharge:  The patient will be discharged home in stable condition.   Patient understands to contact the Center with any post procedure questions or concerns.  Discharge instructions given by nursing staff.

## 2022-03-22 NOTE — DISCHARGE INSTRUCTIONS

## 2022-03-23 ENCOUNTER — TELEPHONE (OUTPATIENT)
Dept: PAIN MEDICINE | Facility: HOSPITAL | Age: 67
End: 2022-03-23

## 2022-04-06 DIAGNOSIS — F41.1 GENERALIZED ANXIETY DISORDER: ICD-10-CM

## 2022-04-11 DIAGNOSIS — F41.1 GENERALIZED ANXIETY DISORDER: ICD-10-CM

## 2022-04-11 RX ORDER — ALPRAZOLAM 0.5 MG/1
TABLET ORAL
Qty: 30 TABLET | Refills: 2 | Status: SHIPPED | OUTPATIENT
Start: 2022-04-11 | End: 2022-06-09 | Stop reason: SDUPTHER

## 2022-04-11 RX ORDER — ALPRAZOLAM 0.5 MG/1
0.5 TABLET ORAL NIGHTLY PRN
Qty: 30 TABLET | Refills: 2 | OUTPATIENT
Start: 2022-04-11

## 2022-04-11 NOTE — TELEPHONE ENCOUNTER
Patient said she tried calling the pharmacy to refill her xanax, they said they tried to send request but didn't hear back. She has been out for a month, please advise.

## 2022-04-11 NOTE — TELEPHONE ENCOUNTER
Rx Refill Note  Requested Prescriptions     Pending Prescriptions Disp Refills   • ALPRAZolam (XANAX) 0.5 MG tablet 30 tablet 0     Sig: Take 1 tablet by mouth At Night As Needed for Anxiety.      Last office visit with prescribing clinician: 3/9/2022      Next office visit with prescribing clinician: 6/9/2022            Saniya Jennings MA  04/11/22, 12:27 EDT

## 2022-04-12 NOTE — TELEPHONE ENCOUNTER
They sent a request on 4/6/22 after I had left for vacation and I just got back today. If she has been out for a month, why didn't she call sooner for a refill?  I sent a new Rx plus two refills

## 2022-04-14 RX ORDER — MORPHINE SULFATE 15 MG/1
TABLET ORAL
Qty: 120 TABLET | Refills: 0 | Status: SHIPPED | OUTPATIENT
Start: 2022-04-14 | End: 2022-06-09

## 2022-04-21 ENCOUNTER — TELEPHONE (OUTPATIENT)
Dept: PAIN MEDICINE | Facility: CLINIC | Age: 67
End: 2022-04-21

## 2022-04-21 NOTE — TELEPHONE ENCOUNTER
I couldn't believe it either, but she failed her drug screen twice for cocaine. I can't ignore two failed UDSs.

## 2022-04-21 NOTE — TELEPHONE ENCOUNTER
Patient wants you reconsidered her coming back she needs her Percocets and states she has not done cocaine and doesn't even know what that is.

## 2022-04-21 NOTE — TELEPHONE ENCOUNTER
Caller: TOÑITO  Relationship to Patient: SELF    Phone Number: [phone]  Reason for Call:PT CALLED STATING THAT SHE BELIEVES SHE WAS DISMISSED FROM PRACTICE AND WANTS TO KNOW WHY

## 2022-04-25 RX ORDER — TOPIRAMATE 25 MG/1
TABLET ORAL
Qty: 60 TABLET | Refills: 1 | Status: SHIPPED | OUTPATIENT
Start: 2022-04-25 | End: 2022-05-26

## 2022-04-25 RX ORDER — BUPROPION HYDROCHLORIDE 150 MG/1
TABLET ORAL
Qty: 30 TABLET | Refills: 1 | Status: SHIPPED | OUTPATIENT
Start: 2022-04-25 | End: 2022-05-23 | Stop reason: DRUGHIGH

## 2022-04-25 RX ORDER — MIRTAZAPINE 15 MG/1
TABLET, FILM COATED ORAL
Qty: 30 TABLET | Refills: 1 | Status: SHIPPED | OUTPATIENT
Start: 2022-04-25 | End: 2022-05-26

## 2022-05-09 ENCOUNTER — OFFICE VISIT (OUTPATIENT)
Dept: CARDIOLOGY | Facility: CLINIC | Age: 67
End: 2022-05-09

## 2022-05-09 VITALS
WEIGHT: 190 LBS | BODY MASS INDEX: 32.44 KG/M2 | HEIGHT: 64 IN | SYSTOLIC BLOOD PRESSURE: 122 MMHG | OXYGEN SATURATION: 94 % | HEART RATE: 88 BPM | DIASTOLIC BLOOD PRESSURE: 75 MMHG

## 2022-05-09 DIAGNOSIS — R07.9 CHEST PAIN IN ADULT: Primary | ICD-10-CM

## 2022-05-09 DIAGNOSIS — I10 PRIMARY HYPERTENSION: ICD-10-CM

## 2022-05-09 DIAGNOSIS — I25.10 MILD CORONARY ARTERY DISEASE: ICD-10-CM

## 2022-05-09 DIAGNOSIS — E78.2 MIXED HYPERLIPIDEMIA: ICD-10-CM

## 2022-05-09 PROCEDURE — 99214 OFFICE O/P EST MOD 30 MIN: CPT | Performed by: NURSE PRACTITIONER

## 2022-05-09 PROCEDURE — 93000 ELECTROCARDIOGRAM COMPLETE: CPT | Performed by: NURSE PRACTITIONER

## 2022-05-09 NOTE — PROGRESS NOTES
[facility] CARDIOLOGY      REASON FOR FOLLOW-UP:  Follow-up chest pain  Coronary artery disease          Chief Complaint   Patient presents with   • Atrial Fibrillation   • Coronary Artery Disease   • Hypertension         Dear Dottie Nunez APRN        History of Present Illness   It was my pleasure to see Marlena in the office today.  As you are aware, she is a very pleasant  67y.o. female with an established history of nonobstructive coronary artery disease.  She is additional history that includes COPD, diabetes mellitus, hypertension, hyperlipidemia, palpitations, tobacco abuse, and antiplatelet therapy.  She was evaluated recently for lower extremity edema.  2D echo was performed that showed normal LV systolic function with EF 66-70%, mild concentric LVH with grade 1 diastolic dysfunction.  She presents today in follow-up for the above-mentioned diagnoses.    Nuclear stress testing has been ordered last 2 office visits.  The patient states that her medical transportation is very unreliable and she has not been able to make that appointment.  She does want to follow through with stress testing.  She continues to have some chest pain as previously described as heaviness in her chest, mild in severity with radiation down her left arm.  Intermittent shortness of breath as well.  She denies any lower extremity edema, dizziness or lightheadedness.  She is now ambulating with assistance of a rolling walker chair.    She does report some tingling in her fingers.  She is treated for chronic back pain that involves lower and cervical spine.    Patient was able to have ABIs completed: Right normal, left with mild digital ischemia.        ASSESSMENT:  Known history of coronary artery disease-nonobstructive  Diastolic dysfunction  Claudication  Diabetes mellitus 2  COPD  Essential hypertension  Ongoing tobacco use        PLAN:  Reschedule nuclear stress test  I will notify patient of any  "significant findings  Follow-up in 6 months or sooner if needed          The following portions of the patient's history were reviewed and updated as appropriate: allergies, current medications, past family history, past medical history, past social history, past surgical history and problem list.    REVIEW OF SYSTEMS:    Review of Systems   Cardiovascular: Positive for chest pain.   Respiratory: Positive for shortness of breath.    Musculoskeletal: Positive for back pain.   All other systems reviewed and are negative.      Vitals:    05/09/22 1427   BP: 122/75   Pulse: 88   SpO2: 94%         PHYSICAL EXAM:    General: Alert, cooperative, no distress, appears stated age  Head:  Normocephalic, atraumatic, mucous membranes moist  Eyes:  Conjunctiva/corneas clear, EOM's intact     Neck:  Supple,  no JVD or bruit     Lungs: Clear to auscultation bilaterally, no wheezes rhonchi rales are noted  Chest wall: No tenderness  Musculoskeletal:   Ambulates freely without assistance  Heart::  Regular rate and rhythm, S1 and S2 normal, no murmur, rub or gallop  Abdomen: Soft, non-tender, nondistended, bowel sounds active, no abdominal bruit  Extremities: No cyanosis, clubbing, or edema   Pulses: 2+ and symmetric all extremities  Skin:  No rashes or lesions  Neuro/psych: A&O x3. CN II through XII are grossly intact with appropriate affect        Past Medical History:   Diagnosis Date   • Anemia    • Anesthesia complication     states had chest tightness and SOA with \"gas\" anesthesia   • Arthritis    • Atrial fibrillation (HCC)     Proxysmal   • Chronic kidney disease    • Constipation    • COPD (chronic obstructive pulmonary disease) (HCC)    • Coronary artery disease    • Dark stools    • Depression    • GERD (gastroesophageal reflux disease)    • History of hernia repair    • Hyperlipidemia    • Hypertension    • Hypothyroid    • IBS (irritable bowel syndrome)    • Liver disease    • Low back pain    • Obesity    • Panic attacks "    • RLS (restless legs syndrome)    • Seizure disorder (HCC)    • Type 2 diabetes mellitus (HCC)    • Vitamin D deficiency    • Weight gain        Past Surgical History:   Procedure Laterality Date   • BACK SURGERY      x 4   • CARDIAC [facility]IZ[facility]      [facility] 3-16   • CHOLECYSTECTOMY     • ENDOSCOPY  03/31/2015    Normal   • HERNIA REPAIR  01/19/2016    Dr Mota   • HYSTERECTOMY     • TONSILLECTOMY           Current Outpatient Medications:   •  ALPRAZolam (XANAX) 0.5 MG tablet, TAKE 1 TABLET BY MOUTH EVERY DAY AS NEEDED FOR PANIC ATTACK (NOT-CYCLE) (Patient taking differently: Take 0.5 mg by mouth Daily As Needed.), Disp: 30 tablet, Rfl: 2  •  ARIPiprazole (ABILIFY) 5 MG tablet, Take 1 tablet by mouth Daily., Disp: 30 tablet, Rfl: 5  •  baclofen (LIORESAL) 10 MG tablet, Take 1 tablet by mouth 3 (Three) Times a Day As Needed for Muscle Spasms., Disp: 90 tablet, Rfl: 2  •  Blood Glucose Calibration (OT ULTRA/FASTTK CNTRL SOLN) solution, , Disp: , Rfl:   •  buPROPion XL (WELLBUTRIN XL) 150 MG 24 hr tablet, TAKE 1 TABLET BY MOUTH EVERY MORNING (Patient taking differently: Take 150 mg by mouth Every Morning.), Disp: 30 tablet, Rfl: 1  •  cephalexin (KEFLEX) 500 MG capsule, TAKE 1 CAPSULE BY MOUTH EVERY 12 HOURS AS DIRECTED, Disp: , Rfl:   •  cetirizine (zyrTEC) 10 MG tablet, Take 10 mg by mouth Daily., Disp: , Rfl:   •  colestipol (COLESTID) 1 g tablet, Take 1 g by mouth., Disp: , Rfl:   •  conjugated estrogens (Premarin) 0.625 MG/GM vaginal cream, 3 (three) times a week, Disp: , Rfl:   •  cyclobenzaprine (FLEXERIL) 5 MG tablet, , Disp: , Rfl:   •  diclofenac (VOLTAREN) 75 MG EC tablet, Take 75 mg by mouth 2 (Two) Times a Day., Disp: , Rfl: 0  •  diphenoxylate-atropine (LOMOTIL) 2.5-0.025 MG per tablet, Daily As Needed., Disp: , Rfl:   •  ezetimibe (ZETIA) 10 MG tablet, Take 10 mg by mouth Daily., Disp: , Rfl:   •  fluticasone-salmeterol (ADVAIR) 250-50 MCG/DOSE DISKUS, Inhale  1 puff 2 (Two) Times a Day., Disp: , Rfl:   •  furosemide (LASIX) 40 MG tablet, Take 40 mg by mouth 2 (Two) Times a Day., Disp: , Rfl:   •  hydrOXYzine pamoate (Vistaril) 50 MG capsule, Take one caps twice daily for anxiety and two at bedtime for sleep, Disp: 120 capsule, Rfl: 3  •  icosapent ethyl (VASCEPA) 1 g capsule capsule, Take 2 g by mouth Daily., Disp: , Rfl:   •  insulin aspart (novoLOG FLEXPEN) 100 UNIT/ML solution pen-injector sc pen, Inject 6 Units under the skin into the appropriate area as directed 3 (Three) Times a Day With Meals., Disp: , Rfl:   •  insulin degludec (TRESIBA FLEXTOUCH) 100 UNIT/ML solution pen-injector injection, Inject 50 Units under the skin into the appropriate area as directed Daily., Disp: , Rfl:   •  insulin lispro (humaLOG) 100 UNIT/ML injection, Inject 6 Units under the skin into the appropriate area as directed Daily., Disp: , Rfl:   •  isosorbide mononitrate (IMDUR) 30 MG 24 hr tablet, , Disp: , Rfl:   •  Lancet Devices (OneTouch Delica Plus Lancing) misc, , Disp: , Rfl:   •  levothyroxine (SYNTHROID, LEVOTHROID) 50 MCG tablet, Take 50 mcg by mouth Daily., Disp: , Rfl:   •  lisinopril (PRINIVIL,ZESTRIL) 2.5 MG tablet, Take 2.5 mg by mouth Daily., Disp: , Rfl:   •  lubiprostone (AMITIZA) 24 MCG capsule, TAKE 1 CAPSULE BY MOUTH TWICE DAILY WITH MEALS (Patient taking differently: Take 24 mcg by mouth 2 (Two) Times a Day With Meals.), Disp: 60 capsule, Rfl: 11  •  metoprolol tartrate (LOPRESSOR) 25 MG tablet, Take 25 mg by mouth 2 (Two) Times a Day., Disp: , Rfl:   •  mirtazapine (REMERON) 15 MG tablet, TAKE 1 TABLET BY MOUTH EVERY NIGHT (Patient taking differently: Take 15 mg by mouth Every Night.), Disp: 30 tablet, Rfl: 1  •  montelukast (SINGULAIR) 10 MG tablet, Take 10 mg by mouth Every Night., Disp: , Rfl:   •  Morphine (MSIR) 15 MG tablet, Take 1 tablet by mouth Every 6 (Six) Hours As Needed for Severe Pain ., Disp: 120 tablet, Rfl: 0  •  Morphine (MSIR) 15 MG tablet, Take  1 tablet by mouth Every 6 (Six) Hours As Needed for Severe Pain ., Disp: 120 tablet, Rfl: 0  •  Morphine (MSIR) 15 MG tablet, Take 1 tablet by mouth Every 6 (Six) Hours As Needed for Severe Pain ., Disp: 120 tablet, Rfl: 0  •  Morphine (MSIR) 15 MG tablet, TAKE ONE TABLET BY MOUTH EVERY 6 HOURS AS NEEDED FOR SEVERE PAIN, Disp: 120 tablet, Rfl: 0  •  Movantik 12.5 MG tablet, , Disp: , Rfl:   •  MULTIPLE VITAMINS ESSENTIAL PO, Take 1 tablet by mouth Daily., Disp: , Rfl:   •  naproxen (NAPROSYN) 375 MG tablet, Take 1 tablet by mouth 2 (Two) Times a Day As Needed for Moderate Pain ., Disp: 14 tablet, Rfl: 0  •  nitroglycerin (NITROSTAT) 0.4 MG SL tablet, Place 1 tablet under the tongue Every 5 (Five) Minutes As Needed for Chest Pain. Take no more than 3 doses in 15 minutes., Disp: 30 tablet, Rfl: 5  •  omeprazole (priLOSEC) 40 MG capsule, Take 1 capsule by mouth Daily., Disp: , Rfl:   •  OneTouch Ultra test strip, , Disp: , Rfl:   •  pantoprazole (PROTONIX) 40 MG EC tablet, Take 40 mg by mouth Daily., Disp: , Rfl:   •  potassium chloride (K-DUR,KLOR-CON) 20 MEQ CR tablet, Take 1 tablet by mouth Daily., Disp: 90 tablet, Rfl: 3  •  pravastatin (PRAVACHOL) 40 MG tablet, Take 1 tablet by mouth Every Night., Disp: , Rfl:   •  pregabalin (LYRICA) 100 MG capsule, Take 1 capsule by mouth 2 (Two) Times a Day As Needed., Disp: , Rfl:   •  pregabalin (LYRICA) 150 MG capsule, Take 150 mg by mouth 2 (Two) Times a Day., Disp: , Rfl:   •  promethazine (PHENERGAN) 25 MG tablet, Take 25 mg by mouth Every 6 (Six) Hours As Needed. for nausea, Disp: , Rfl:   •  rOPINIRole (REQUIP) 0.5 MG tablet, Take 0.5 mg by mouth Every Night., Disp: , Rfl:   •  sorbitol 70 % solution solution, See Admin Instructions., Disp: , Rfl:   •  theophylline (THEODUR) 300 MG 12 hr tablet, Take 300 mg by mouth Daily., Disp: , Rfl:   •  tiZANidine (ZANAFLEX) 4 MG tablet, Take 1.5 tablets by mouth Every 8 (Eight) Hours As Needed for Muscle Spasms., Disp: 135 tablet,  "Rfl: 11  •  topiramate (TOPAMAX) 25 MG tablet, TAKE 1 TABLET BY MOUTH TWICE DAILY (Patient taking differently: Take 25 mg by mouth 2 (Two) Times a Day.), Disp: 60 tablet, Rfl: 1  •  Tradjenta 5 MG tablet tablet, Take 5 mg by mouth Daily., Disp: , Rfl:   •  Unifine Pentips 32G X 4 MM misc, USE 3 TIMES DAILY WITH INSULIN INJECTIONS DX E11.65, Disp: , Rfl:   •  venlafaxine XR (EFFEXOR-XR) 150 MG 24 hr capsule, Take 2 capsules by mouth Daily., Disp: 60 capsule, Rfl: 5  •  vitamin D (ERGOCALCIFEROL) 1.25 MG (78386 UT) capsule capsule, Take 50,000 Units by mouth Every 30 (Thirty) Days., Disp: , Rfl:   •  mometasone (NASONEX) 50 MCG/ACT nasal spray, 2 sprays into the nostril(s) as directed by provider Daily., Disp: , Rfl:     Allergies   Allergen Reactions   • Adhesive Tape Rash     Paper tape causes the rash   • Contrast Dye GI Intolerance   • Iodinated Diagnostic Agents Nausea Only   • Latex Rash   • Penicillins Hives   • Statins Rash     BAD LEG CRAMPS     • Sulfa Antibiotics Hives       Family History   Problem Relation Age of Onset   • Cancer Mother         Other Cancer   • Heart disease Mother    • Hypertension Mother    • Stroke Mother    • Hypertension Father    • Kidney disease Father    • Diabetes Paternal Uncle    • Cancer Paternal Uncle         Colon Cancer       Social History     Tobacco Use   • Smoking status: Current Every Day Smoker     Packs/day: 2.00   • Smokeless tobacco: Never Used   • Tobacco comment: Passive Smoke: N   Substance Use Topics   • Alcohol use: No           Current Electrocardiogram:    ECG 12 Lead    Date/Time: 5/9/2022 4:41 PM  Performed by: Ansley Hernandez APRN  Authorized by: Ansley Hernandez APRN   Comparison: not compared with previous ECG   Rhythm: sinus rhythm  Q waves: V1 and V2                    EMR Dragon/Transcription:   \"Dictated utilizing Dragon dictation\".       "

## 2022-05-12 ENCOUNTER — TELEPHONE (OUTPATIENT)
Dept: PAIN MEDICINE | Facility: CLINIC | Age: 67
End: 2022-05-12

## 2022-05-12 NOTE — TELEPHONE ENCOUNTER
Caller: TOÑITO   Relationship to Patient: SELF     Phone Number: [phone]  Reason for Call: PATIENT CALLING STATING SHE GOT A TEXT TO CONFIRM HER APPT BUT STATES SHE WAS DISCHARGED FROM PAIN MANAGEMENT ATTEMPTED TO WARM TRANSFER

## 2022-05-12 NOTE — TELEPHONE ENCOUNTER
PT CAN BE SEEN FOR INJECTIONS ONLY NO MEDICATIONS WILL BE PRESCRIBED 5/19/22 FOLLOW UP APPT CANCELLED. TRIED TO CALL PATIENT BUT NO ANSWER AND VM FULL. OK FOR HUB TO READ.

## 2022-05-20 ENCOUNTER — TELEPHONE (OUTPATIENT)
Dept: PSYCHIATRY | Facility: CLINIC | Age: 67
End: 2022-05-20

## 2022-05-23 ENCOUNTER — APPOINTMENT (OUTPATIENT)
Dept: CARDIOLOGY | Facility: HOSPITAL | Age: 67
End: 2022-05-23

## 2022-05-23 DIAGNOSIS — F34.1 DYSTHYMIA: Chronic | ICD-10-CM

## 2022-05-23 RX ORDER — BUPROPION HYDROCHLORIDE 300 MG/1
300 TABLET ORAL EVERY MORNING
Qty: 30 TABLET | Refills: 2 | Status: SHIPPED | OUTPATIENT
Start: 2022-05-23 | End: 2022-06-09

## 2022-05-23 RX ORDER — VENLAFAXINE HYDROCHLORIDE 150 MG/1
CAPSULE, EXTENDED RELEASE ORAL
Qty: 60 CAPSULE | Refills: 4 | Status: SHIPPED | OUTPATIENT
Start: 2022-05-23 | End: 2022-10-12

## 2022-05-23 NOTE — TELEPHONE ENCOUNTER
Continue taking the Effexor (venlafaxine) and Abilify but increase the Wellbutrin to 300 mg every morning.  If that does not improve things enough in a month, we will increase the Abilify next.  I have sent a new Rx to Donna for the new Wellbutrin dosage

## 2022-05-26 RX ORDER — MIRTAZAPINE 15 MG/1
TABLET, FILM COATED ORAL
Qty: 28 TABLET | Refills: 2 | Status: SHIPPED | OUTPATIENT
Start: 2022-05-26 | End: 2022-09-15 | Stop reason: SDUPTHER

## 2022-05-26 RX ORDER — TOPIRAMATE 25 MG/1
TABLET ORAL
Qty: 56 TABLET | Refills: 2 | Status: SHIPPED | OUTPATIENT
Start: 2022-05-26 | End: 2022-06-09

## 2022-06-06 ENCOUNTER — APPOINTMENT (OUTPATIENT)
Dept: CARDIOLOGY | Facility: HOSPITAL | Age: 67
End: 2022-06-06

## 2022-06-09 ENCOUNTER — OFFICE VISIT (OUTPATIENT)
Dept: PSYCHIATRY | Facility: CLINIC | Age: 67
End: 2022-06-09

## 2022-06-09 DIAGNOSIS — F41.1 GENERALIZED ANXIETY DISORDER: Chronic | ICD-10-CM

## 2022-06-09 DIAGNOSIS — F34.1 DYSTHYMIA: Primary | Chronic | ICD-10-CM

## 2022-06-09 DIAGNOSIS — F51.05 INSOMNIA DUE TO MENTAL DISORDER: ICD-10-CM

## 2022-06-09 PROCEDURE — 99214 OFFICE O/P EST MOD 30 MIN: CPT | Performed by: PHYSICIAN ASSISTANT

## 2022-06-09 RX ORDER — PREGABALIN 200 MG/1
200 CAPSULE ORAL 2 TIMES DAILY
COMMUNITY
Start: 2022-05-25

## 2022-06-09 RX ORDER — TOPIRAMATE 50 MG/1
50 TABLET, FILM COATED ORAL 2 TIMES DAILY
Qty: 56 TABLET | Refills: 5 | Status: SHIPPED | OUTPATIENT
Start: 2022-06-09 | End: 2022-12-06

## 2022-06-09 RX ORDER — BUTALBITAL, ACETAMINOPHEN AND CAFFEINE 50; 325; 40 MG/1; MG/1; MG/1
TABLET ORAL
COMMUNITY
End: 2022-12-05 | Stop reason: ALTCHOICE

## 2022-06-09 RX ORDER — ALPRAZOLAM 0.5 MG/1
0.5 TABLET ORAL 3 TIMES DAILY PRN
Qty: 90 TABLET | Refills: 2 | Status: SHIPPED | OUTPATIENT
Start: 2022-06-09 | End: 2022-09-08 | Stop reason: SDUPTHER

## 2022-06-09 RX ORDER — ZIPRASIDONE HYDROCHLORIDE 40 MG/1
40 CAPSULE ORAL NIGHTLY
Qty: 28 CAPSULE | Refills: 5 | Status: SHIPPED | OUTPATIENT
Start: 2022-06-09 | End: 2022-06-23
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
## 2022-06-15 RX ORDER — MORPHINE SULFATE 15 MG/1
TABLET ORAL
Qty: 120 TABLET | Refills: 0 | OUTPATIENT
Start: 2022-06-15

## 2022-06-16 ENCOUNTER — TELEPHONE (OUTPATIENT)
Dept: PSYCHIATRY | Facility: CLINIC | Age: 67
End: 2022-06-16

## 2022-06-16 NOTE — TELEPHONE ENCOUNTER
Pt says she is in pain in both her legs and lower back.  Pt says she is tired of living like this and needs a new depression medication because venlafaxine 300 mg is not helping.  Advised to go to [facility] if she is having a crisis, but she says she has bills to pay and would rather have a new med.  No sleep since the last time she talked to you.

## 2022-06-22 ENCOUNTER — TELEPHONE (OUTPATIENT)
Dept: PAIN MEDICINE | Facility: CLINIC | Age: 67
End: 2022-06-22

## 2022-06-22 NOTE — TELEPHONE ENCOUNTER
6/22/22- spoke to pt--  pt is injections only-- will need to see PCP or go to ER if in extreme pain

## 2022-06-22 NOTE — TELEPHONE ENCOUNTER
I just started her on Geodon 40 mg during her last visit, so tell her to take two of those at bedtime now to see if it helps her sleep better and helps her mood. I understand that she has a lot of back pain, but the antidepressants are not going to get rid of that.

## 2022-06-22 NOTE — TELEPHONE ENCOUNTER
Pt says she needs a new rx sent in for Geodon BID, because they have been pre-packaging her meds each day.  She wants me to call the pharmacy and ask for Geodon to not be individually packed like DivvyDose.  Please advise.

## 2022-06-22 NOTE — TELEPHONE ENCOUNTER
Provider: DR BEARD    Caller: TOÑITO TREADWELL    Relationship to Patient: SELF    Phone Number: [phone]    Reason for Call: PATIENT STATES THAT THE INJECTIONS HAVE NOT BEEN HELPING, SHE SAYS SHE NEEDS SOMETHING FOR PAIN AND SHE DOESN'T KNOW WHAT SHOWED UP IN HER LAST UDS BUT SHE WANTS TO TALK TO DR BEARD.

## 2022-06-23 RX ORDER — ZIPRASIDONE HYDROCHLORIDE 80 MG/1
80 CAPSULE ORAL NIGHTLY
Qty: 30 CAPSULE | Refills: 5 | Status: SHIPPED | OUTPATIENT
Start: 2022-06-23 | End: 2022-12-06

## 2022-06-23 NOTE — TELEPHONE ENCOUNTER
I don't want it to be BID.  I want 80 mg at bedtime.  Since she had the 40 mg, I was going to have her take two at night, but I will send a new Rx for the 80 mg caps, one at bedtime.

## 2022-07-15 RX ORDER — LUBIPROSTONE 24 UG/1
CAPSULE ORAL
Qty: 60 CAPSULE | Refills: 10 | Status: SHIPPED | OUTPATIENT
Start: 2022-07-15

## 2022-07-19 RX ORDER — DULOXETIN HYDROCHLORIDE 30 MG/1
CAPSULE, DELAYED RELEASE ORAL
Qty: 60 CAPSULE | Refills: 2 | OUTPATIENT
Start: 2022-07-19

## 2022-07-20 RX ORDER — BUPROPION HYDROCHLORIDE 300 MG/1
TABLET ORAL
Qty: 28 TABLET | Refills: 5 | Status: SHIPPED | OUTPATIENT
Start: 2022-07-20 | End: 2022-12-05 | Stop reason: ALTCHOICE

## 2022-08-17 ENCOUNTER — OFFICE (AMBULATORY)
Dept: URBAN - METROPOLITAN AREA CLINIC 64 | Facility: CLINIC | Age: 67
End: 2022-08-17

## 2022-08-17 VITALS
DIASTOLIC BLOOD PRESSURE: 86 MMHG | SYSTOLIC BLOOD PRESSURE: 148 MMHG | HEART RATE: 96 BPM | WEIGHT: 184 LBS | HEIGHT: 63 IN

## 2022-08-17 DIAGNOSIS — K59.00 CONSTIPATION, UNSPECIFIED: ICD-10-CM

## 2022-08-17 DIAGNOSIS — R10.30 LOWER ABDOMINAL PAIN, UNSPECIFIED: ICD-10-CM

## 2022-08-17 PROCEDURE — 99213 OFFICE O/P EST LOW 20 MIN: CPT | Performed by: NURSE PRACTITIONER

## 2022-08-17 RX ORDER — COLESTIPOL HYDROCHLORIDE 1 G/1
TABLET, FILM COATED ORAL
Qty: 60 | Status: COMPLETED
End: 2022-08-17

## 2022-08-17 RX ORDER — POLYETHYLENE GLYCOL 3350 17 G/17G
34 POWDER, FOR SOLUTION ORAL
Qty: 2 | Refills: 11 | Status: ACTIVE
Start: 2022-08-17

## 2022-09-07 RX ORDER — NITROGLYCERIN 0.4 MG/1
TABLET SUBLINGUAL
Qty: 25 TABLET | Refills: 4 | Status: SHIPPED | OUTPATIENT
Start: 2022-09-07

## 2022-09-08 DIAGNOSIS — F41.1 GENERALIZED ANXIETY DISORDER: Chronic | ICD-10-CM

## 2022-09-08 RX ORDER — ALPRAZOLAM 0.5 MG/1
0.5 TABLET ORAL 3 TIMES DAILY PRN
Qty: 90 TABLET | Refills: 2 | Status: SHIPPED | OUTPATIENT
Start: 2022-09-08 | End: 2022-11-09 | Stop reason: SDUPTHER

## 2022-09-15 RX ORDER — MIRTAZAPINE 15 MG/1
15 TABLET, FILM COATED ORAL NIGHTLY
Qty: 28 TABLET | Refills: 2 | Status: SHIPPED | OUTPATIENT
Start: 2022-09-15 | End: 2022-12-06

## 2022-10-11 DIAGNOSIS — F34.1 DYSTHYMIA: Chronic | ICD-10-CM

## 2022-10-12 RX ORDER — VENLAFAXINE HYDROCHLORIDE 150 MG/1
CAPSULE, EXTENDED RELEASE ORAL
Qty: 60 CAPSULE | Refills: 0 | Status: SHIPPED | OUTPATIENT
Start: 2022-10-12 | End: 2022-11-04

## 2022-11-04 DIAGNOSIS — F34.1 DYSTHYMIA: Chronic | ICD-10-CM

## 2022-11-04 RX ORDER — VENLAFAXINE HYDROCHLORIDE 150 MG/1
CAPSULE, EXTENDED RELEASE ORAL
Qty: 60 CAPSULE | Refills: 0 | Status: SHIPPED | OUTPATIENT
Start: 2022-11-04 | End: 2022-12-06

## 2022-11-09 ENCOUNTER — TELEPHONE (OUTPATIENT)
Dept: CARDIOLOGY | Facility: CLINIC | Age: 67
End: 2022-11-09

## 2022-11-09 DIAGNOSIS — F41.1 GENERALIZED ANXIETY DISORDER: Chronic | ICD-10-CM

## 2022-11-09 RX ORDER — ALPRAZOLAM 0.5 MG/1
0.5 TABLET ORAL 3 TIMES DAILY PRN
Qty: 90 TABLET | Refills: 1 | Status: SHIPPED | OUTPATIENT
Start: 2022-11-09 | End: 2023-01-23

## 2022-11-09 NOTE — TELEPHONE ENCOUNTER
Caller: Marlena Avilez    Relationship to patient: Self    Best call back number: [phone]    Patient is needing: PT REACHING OUT TO SEE IF SHE STILL NEEDS TO HAVE A TREADMILL STRESS TEST DONE.

## 2022-11-10 NOTE — TELEPHONE ENCOUNTER
I have sent two month refills so she can get in to see the new provider at the office in [place] who took my place.  The new provider started this week and should have a schedule open, so Marlena needs to call tomorrow and get scheduled with the new provider before these refills run out.  [phone]

## 2022-11-15 DIAGNOSIS — R07.9 CHEST PAIN IN ADULT: ICD-10-CM

## 2022-11-15 DIAGNOSIS — Q24.5 CONGENITAL CORONARY ARTERY FISTULA: Primary | ICD-10-CM

## 2022-12-05 ENCOUNTER — OFFICE VISIT (OUTPATIENT)
Dept: CARDIOLOGY | Facility: CLINIC | Age: 67
End: 2022-12-05

## 2022-12-05 VITALS
DIASTOLIC BLOOD PRESSURE: 71 MMHG | SYSTOLIC BLOOD PRESSURE: 118 MMHG | HEIGHT: 64 IN | WEIGHT: 185 LBS | HEART RATE: 97 BPM | OXYGEN SATURATION: 97 % | BODY MASS INDEX: 31.58 KG/M2

## 2022-12-05 DIAGNOSIS — I10 PRIMARY HYPERTENSION: ICD-10-CM

## 2022-12-05 DIAGNOSIS — I25.10 MILD CORONARY ARTERY DISEASE: ICD-10-CM

## 2022-12-05 DIAGNOSIS — E78.2 MIXED HYPERLIPIDEMIA: Primary | ICD-10-CM

## 2022-12-05 PROCEDURE — 99213 OFFICE O/P EST LOW 20 MIN: CPT | Performed by: NURSE PRACTITIONER

## 2022-12-05 PROCEDURE — 93000 ELECTROCARDIOGRAM COMPLETE: CPT | Performed by: NURSE PRACTITIONER

## 2022-12-05 RX ORDER — ARIPIPRAZOLE 5 MG/1
5 TABLET ORAL DAILY
COMMUNITY

## 2022-12-05 NOTE — PROGRESS NOTES
[facility] CARDIOLOGY      REASON FOR FOLLOW-UP:  Coronary artery disease  Hypertension          Chief Complaint   Patient presents with   • Heart Problem         Dear EnriqueDottie APRN        History of Present Illness   It was my pleasure to see Marlena in the office today.  As you are aware, she is a 67 y.o. female with an established history of nonobstructive coronary artery disease. She has additional history that includes COPD with ongoing tobacco use, diabetes mellitus, hypertension, hyperlipidemia, palpitations, antiplatelet therapy.  2D echo 8/5/2020 was performed that showed normal LV systolic function with EF 66-70%, mild concentric LVH with grade 1 diastolic dysfunction.  She presents today in follow-up for the above-mentioned diagnoses.    Of note, we have been trying to complete a nuclear stress test that was ordered initially in May 2021 for symptoms of chest pain concerning for unstable angina.  Ms. Avilez is disabled, does not drive and her transportation is somewhat unreliable.  She stated she has new transportation and would like to reschedule the stress test.  She continues to have symptoms of intermittent chest discomfort described as heaviness, mild in severity that radiates down her left arm.  Intermittent shortness of breath.  No recent lower extremity edema, dizziness or lightheadedness.  She reports today that she is having severe left hip pain from sciatica and is ambulating with assistance of a rolling walker.  She is limited in options for oral pain control due to her chronic kidney disease.  The patient reports her blood pressure was elevated this morning at 170 systolic.  She describes severe pain from her sciatica this morning.  Her blood pressure is under excellent control in the office today at 118/71.      ASSESSMENT:  Chest pain/unstable angina  Nonobstructive coronary artery disease  Diastolic dysfunction  Chronic kidney disease  Diabetes mellitus  "2  COPD  Essential hypertension  Ongoing tobacco use    PLAN:  Continue current CV plan of care  Encourage patient to investigate nonpharmacological options for pain control including ice/heat  Continue risk factor modification  Recommend complete smoking cessation  Reschedule stress testing.  I will notify patient of any abnormal findings follow-up in 6 months or sooner if needed        The following portions of the patient's history were reviewed and updated as appropriate: allergies, current medications, past family history, past medical history, past social history, past surgical history and problem list.    REVIEW OF SYSTEMS:    Review of Systems   Cardiovascular: Positive for chest pain and dyspnea on exertion.   Musculoskeletal: Positive for back pain.        Hip pain   All other systems reviewed and are negative.      Vitals:    12/05/22 1311   BP: 118/71   Pulse: 97   SpO2: 97%         PHYSICAL EXAM:    General: Alert, cooperative, no distress, appears stated age  Head:  Normocephalic, atraumatic, mucous membranes moist  Eyes:  Conjunctiva/corneas clear, EOM's intact     Neck:  Supple,  no JVD or bruit     Lungs: Clear to auscultation bilaterally, no wheezes rhonchi rales are noted  Chest wall: No tenderness  Musculoskeletal:   Ambulates with assistance of a walker  Heart::  Regular rate and rhythm, S1 and S2 normal, no murmur, rub or gallop  Abdomen: Soft, non-tender, nondistended, bowel sounds active, no abdominal bruit  Extremities: No cyanosis, clubbing, or edema   Pulses: 2+ and symmetric all extremities  Skin:  No rashes or lesions  Neuro/psych: A&O x3. CN II through XII are grossly intact with appropriate affect        Past Medical History:   Diagnosis Date   • Anemia    • Anesthesia complication     states had chest tightness and SOA with \"gas\" anesthesia   • Arthritis    • Atrial fibrillation (HCC)     Proxysmal   • Chronic kidney disease    • Constipation    • COPD (chronic obstructive pulmonary " disease) (HCC)    • Coronary artery disease    • Dark stools    • Depression    • GERD (gastroesophageal reflux disease)    • History of hernia repair    • Hyperlipidemia    • Hypertension    • Hypothyroid    • IBS (irritable bowel syndrome)    • Liver disease    • Low back pain    • Obesity    • Panic attacks    • RLS (restless legs syndrome)    • Seizure disorder (HCC)    • Type 2 diabetes mellitus (HCC)    • Vitamin D deficiency    • Weight gain        Past Surgical History:   Procedure Laterality Date   • BACK SURGERY      x 4   • CARDIAC CATHETERIZATION      Abstraction from [facility]: [facility]? 1980's, 3-16   • CHOLECYSTECTOMY     • COLONOSCOPY     • ENDOSCOPY  03/31/2015    Normal   • HERNIA REPAIR  01/19/2016    Dr Mota   • HYSTERECTOMY     • TONSILLECTOMY           Current Outpatient Medications:   •  ALPRAZolam (XANAX) 0.5 MG tablet, Take 1 tablet by mouth 3 (Three) Times a Day As Needed for Anxiety., Disp: 90 tablet, Rfl: 1  •  ARIPiprazole (ABILIFY) 5 MG tablet, Take 5 mg by mouth Daily., Disp: , Rfl:   •  baclofen (LIORESAL) 10 MG tablet, Take 1 tablet by mouth 3 (Three) Times a Day As Needed for Muscle Spasms., Disp: 90 tablet, Rfl: 2  •  Blood Glucose Calibration (OT ULTRA/FASTTK CNTRL SOLN) solution, , Disp: , Rfl:   •  buPROPion XL (WELLBUTRIN XL) 300 MG 24 hr tablet, TAKE 1 TABLET BY MOUTH EVERY MORNING, Disp: 28 tablet, Rfl: 5  •  butalbital-acetaminophen-caffeine (FIORICET, ESGIC) -40 MG per tablet, butalbital-acetaminophen-caffeine 50 mg-325 mg-40 mg tablet  TAKE 1-2 TABLETS BY MOUTH EVERY 4 HOURS AS NEEDED FOR HEADACHES (MAXIMUM OF 6 PILLS A DAY), Disp: , Rfl:   •  diphenoxylate-atropine (LOMOTIL) 2.5-0.025 MG per tablet, Daily As Needed., Disp: , Rfl:   •  ezetimibe (ZETIA) 10 MG tablet, Take 10 mg by mouth Daily., Disp: , Rfl:   •  fluticasone-salmeterol (ADVAIR) 250-50 MCG/DOSE DISKUS, Inhale 1 puff 2 (Two) Times a Day., Disp: , Rfl:   •  furosemide (LASIX) 40 MG tablet, Take 40 mg  by mouth 2 (Two) Times a Day., Disp: , Rfl:   •  insulin degludec (TRESIBA FLEXTOUCH) 100 UNIT/ML solution pen-injector injection, Inject 50 Units under the skin into the appropriate area as directed Daily., Disp: , Rfl:   •  insulin lispro (humaLOG) 100 UNIT/ML injection, Inject 6 Units under the skin into the appropriate area as directed Daily., Disp: , Rfl:   •  isosorbide mononitrate (IMDUR) 30 MG 24 hr tablet, Take 1 tablet by mouth Daily., Disp: , Rfl:   •  Lancet Devices (Front Appuch Delica Plus Lancing) misc, , Disp: , Rfl:   •  levothyroxine (SYNTHROID, LEVOTHROID) 50 MCG tablet, Take 50 mcg by mouth Daily., Disp: , Rfl:   •  lubiprostone (AMITIZA) 24 MCG capsule, TAKE 1 CAPSULE BY MOUTH TWICE DAILY WITH MEALS, Disp: 60 capsule, Rfl: 10  •  metoprolol tartrate (LOPRESSOR) 25 MG tablet, Take 25 mg by mouth 2 (Two) Times a Day., Disp: , Rfl:   •  montelukast (SINGULAIR) 10 MG tablet, Take 10 mg by mouth Every Night., Disp: , Rfl:   •  MULTIPLE VITAMINS ESSENTIAL PO, Take 1 tablet by mouth Daily., Disp: , Rfl:   •  naproxen (NAPROSYN) 375 MG tablet, Take 1 tablet by mouth 2 (Two) Times a Day As Needed for Moderate Pain ., Disp: 14 tablet, Rfl: 0  •  pantoprazole (PROTONIX) 40 MG EC tablet, Take 40 mg by mouth Daily., Disp: , Rfl:   •  pregabalin (LYRICA) 200 MG capsule, 200 mg 2 (Two) Times a Day., Disp: , Rfl:   •  theophylline (THEODUR) 300 MG 12 hr tablet, Take 300 mg by mouth Daily., Disp: , Rfl:   •  venlafaxine XR (EFFEXOR-XR) 150 MG 24 hr capsule, TAKE 2 CAPSULES BY MOUTH EVERY DAY, Disp: 60 capsule, Rfl: 0  •  cetirizine (zyrTEC) 10 MG tablet, Take 10 mg by mouth Daily., Disp: , Rfl:   •  colestipol (COLESTID) 1 g tablet, Take 1 g by mouth. (Patient not taking: Reported on 10/12/2022), Disp: , Rfl:   •  conjugated estrogens (Premarin) 0.625 MG/GM vaginal cream, 3 (three) times a week, Disp: , Rfl:   •  cyclobenzaprine (FLEXERIL) 5 MG tablet, , Disp: , Rfl:   •  hydrOXYzine pamoate (Vistaril) 50 MG  capsule, Take one caps twice daily for anxiety and two at bedtime for sleep, Disp: 120 capsule, Rfl: 3  •  icosapent ethyl (VASCEPA) 1 g capsule capsule, Take 2 g by mouth Daily. (Patient not taking: Reported on 10/12/2022), Disp: , Rfl:   •  insulin aspart (novoLOG FLEXPEN) 100 UNIT/ML solution pen-injector sc pen, Inject 6 Units under the skin into the appropriate area as directed 3 (Three) Times a Day With Meals., Disp: , Rfl:   •  lisinopril (PRINIVIL,ZESTRIL) 2.5 MG tablet, Take 2.5 mg by mouth Daily., Disp: , Rfl:   •  mirtazapine (REMERON) 15 MG tablet, Take 1 tablet by mouth Every Night., Disp: 28 tablet, Rfl: 2  •  mometasone (NASONEX) 50 MCG/ACT nasal spray, 2 sprays into the nostril(s) as directed by provider Daily., Disp: , Rfl:   •  nitroglycerin (NITROSTAT) 0.4 MG SL tablet, DISSOLVE 1 TABLET UNDER TONGUE EVERY 5 MINUTES AS NEEDED FOR CHEST PAIN. MAX: 3 TABLETS IN 15 MINUTES, Disp: 25 tablet, Rfl: 4  •  omeprazole (priLOSEC) 40 MG capsule, Take 1 capsule by mouth Daily., Disp: , Rfl:   •  OneTouch Ultra test strip, , Disp: , Rfl:   •  potassium chloride (K-DUR,KLOR-CON) 20 MEQ CR tablet, Take 1 tablet by mouth Daily., Disp: 90 tablet, Rfl: 3  •  pravastatin (PRAVACHOL) 40 MG tablet, Take 1 tablet by mouth Every Night., Disp: , Rfl:   •  promethazine (PHENERGAN) 25 MG tablet, Take 25 mg by mouth Every 6 (Six) Hours As Needed. for nausea, Disp: , Rfl:   •  sorbitol 70 % solution solution, See Admin Instructions., Disp: , Rfl:   •  tiZANidine (ZANAFLEX) 4 MG tablet, Take 1.5 tablets by mouth Every 8 (Eight) Hours As Needed for Muscle Spasms. (Patient not taking: Reported on 10/12/2022), Disp: 135 tablet, Rfl: 11  •  topiramate (TOPAMAX) 50 MG tablet, Take 1 tablet by mouth 2 (Two) Times a Day. (Patient not taking: Reported on 10/12/2022), Disp: 56 tablet, Rfl: 5  •  Tradjenta 5 MG tablet tablet, Take 5 mg by mouth Daily., Disp: , Rfl:   •  Unifine Pentips 32G X 4 MM misc, USE 3 TIMES DAILY WITH INSULIN  "INJECTIONS DX E11.65, Disp: , Rfl:   •  vitamin D (ERGOCALCIFEROL) 1.25 MG (09845 UT) capsule capsule, Take 50,000 Units by mouth Every 30 (Thirty) Days., Disp: , Rfl:   •  ziprasidone (Geodon) 80 MG capsule, Take 1 capsule by mouth Every Night., Disp: 30 capsule, Rfl: 5    Allergies   Allergen Reactions   • Adhesive Tape Rash     Paper tape causes the rash   • Contrast Dye GI Intolerance   • Iodinated Diagnostic Agents Nausea Only   • Latex Rash   • Penicillins Hives   • Statins Rash     BAD LEG CRAMPS     • Sulfa Antibiotics Hives       Family History   Problem Relation Age of Onset   • Cancer Mother         Other Cancer   • Heart disease Mother    • Hypertension Mother    • Stroke Mother    • Hypertension Father    • Kidney disease Father    • Diabetes Paternal Uncle    • Cancer Paternal Uncle         Colon Cancer       Social History     Tobacco Use   • Smoking status: Every Day     Packs/day: 2.00     Types: Cigarettes   • Smokeless tobacco: Never   • Tobacco comments:     Passive Smoke: N   Substance Use Topics   • Alcohol use: No           Current Electrocardiogram:    ECG 12 Lead    Date/Time: 12/5/2022 2:03 PM  Performed by: Ansley Hernandez APRN  Authorized by: Ansley Hernandez APRN   Comparison: not compared with previous ECG   Previous ECG: no previous ECG available  Rhythm: sinus rhythm  BPM: 97  Q waves: V1 and V2    QRS axis: right                  EMR Dragon/Transcription:   \"Dictated utilizing Dragon dictation\".       "

## 2022-12-06 ENCOUNTER — OFFICE (AMBULATORY)
Dept: URBAN - METROPOLITAN AREA CLINIC 64 | Facility: CLINIC | Age: 67
End: 2022-12-06

## 2022-12-06 DIAGNOSIS — K59.00 CONSTIPATION, UNSPECIFIED: ICD-10-CM

## 2022-12-06 DIAGNOSIS — F34.1 DYSTHYMIA: Chronic | ICD-10-CM

## 2022-12-06 DIAGNOSIS — R13.10 DYSPHAGIA, UNSPECIFIED: ICD-10-CM

## 2022-12-06 DIAGNOSIS — R10.84 GENERALIZED ABDOMINAL PAIN: ICD-10-CM

## 2022-12-06 DIAGNOSIS — R10.32 LEFT LOWER QUADRANT PAIN: ICD-10-CM

## 2022-12-06 PROCEDURE — 99214 OFFICE O/P EST MOD 30 MIN: CPT | Performed by: INTERNAL MEDICINE

## 2022-12-06 RX ORDER — ZIPRASIDONE HYDROCHLORIDE 80 MG/1
CAPSULE ORAL
Qty: 28 CAPSULE | Refills: 1 | Status: SHIPPED | OUTPATIENT
Start: 2022-12-06 | End: 2023-01-23 | Stop reason: SDUPTHER

## 2022-12-06 RX ORDER — MIRTAZAPINE 15 MG/1
15 TABLET, FILM COATED ORAL NIGHTLY
Qty: 28 TABLET | Refills: 1 | Status: SHIPPED | OUTPATIENT
Start: 2022-12-06 | End: 2023-01-26

## 2022-12-06 RX ORDER — TOPIRAMATE 50 MG/1
TABLET, FILM COATED ORAL
Qty: 56 TABLET | Refills: 1 | Status: SHIPPED | OUTPATIENT
Start: 2022-12-06 | End: 2023-01-23 | Stop reason: SDUPTHER

## 2022-12-06 RX ORDER — VENLAFAXINE HYDROCHLORIDE 150 MG/1
CAPSULE, EXTENDED RELEASE ORAL
Qty: 60 CAPSULE | Refills: 0 | Status: SHIPPED | OUTPATIENT
Start: 2022-12-06 | End: 2022-12-29

## 2022-12-08 ENCOUNTER — HOSPITAL ENCOUNTER (EMERGENCY)
Facility: HOSPITAL | Age: 67
Discharge: HOME | End: 2022-12-09
Attending: EMERGENCY MEDICINE | Admitting: EMERGENCY MEDICINE
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
## 2022-12-08 DIAGNOSIS — R53.1 GENERAL WEAKNESS: Primary | ICD-10-CM

## 2022-12-08 PROCEDURE — 93005 ELECTROCARDIOGRAM TRACING: CPT | Performed by: EMERGENCY MEDICINE

## 2022-12-08 PROCEDURE — 99284 EMERGENCY DEPT VISIT MOD MDM: CPT

## 2022-12-08 RX ORDER — ACETAMINOPHEN 500 MG
1000 TABLET ORAL ONCE
Status: COMPLETED | OUTPATIENT
Start: 2022-12-08 | End: 2022-12-09

## 2022-12-08 RX ORDER — SODIUM CHLORIDE 0.9 % (FLUSH) 0.9 %
10 SYRINGE (ML) INJECTION AS NEEDED
Status: DISCONTINUED | OUTPATIENT
Start: 2022-12-08 | End: 2022-12-09 | Stop reason: HOSPADM

## 2022-12-09 ENCOUNTER — APPOINTMENT (OUTPATIENT)
Dept: CT IMAGING | Facility: HOSPITAL | Age: 67
End: 2022-12-09

## 2022-12-09 ENCOUNTER — APPOINTMENT (OUTPATIENT)
Dept: GENERAL RADIOLOGY | Facility: HOSPITAL | Age: 67
End: 2022-12-09

## 2022-12-09 ENCOUNTER — HOSPITAL ENCOUNTER (EMERGENCY)
Facility: HOSPITAL | Age: 67
Discharge: STILL ADMITTED OR ADMINISTRATIVE | End: 2022-12-09

## 2022-12-09 VITALS
TEMPERATURE: 98.1 F | WEIGHT: 185 LBS | RESPIRATION RATE: 16 BRPM | HEIGHT: 64 IN | DIASTOLIC BLOOD PRESSURE: 78 MMHG | OXYGEN SATURATION: 94 % | HEART RATE: 104 BPM | SYSTOLIC BLOOD PRESSURE: 159 MMHG | BODY MASS INDEX: 31.58 KG/M2

## 2022-12-09 LAB
ANION GAP SERPL CALCULATED.3IONS-SCNC: 10 MMOL/L (ref 5–15)
B PARAPERT DNA SPEC QL NAA+PROBE: NOT DETECTED
B PERT DNA SPEC QL NAA+PROBE: NOT DETECTED
BASOPHILS # BLD AUTO: 0.1 10*3/MM3 (ref 0–0.2)
BASOPHILS NFR BLD AUTO: 0.8 % (ref 0–1.5)
BUN SERPL-MCNC: 32 MG/DL (ref 8–23)
BUN/CREAT SERPL: 35.6 (ref 7–25)
C PNEUM DNA NPH QL NAA+NON-PROBE: NOT DETECTED
CALCIUM SPEC-SCNC: 10.4 MG/DL (ref 8.6–10.5)
CHLORIDE SERPL-SCNC: 102 MMOL/L (ref 98–107)
CO2 SERPL-SCNC: 26 MMOL/L (ref 22–29)
CREAT SERPL-MCNC: 0.9 MG/DL (ref 0.57–1)
DEPRECATED RDW RBC AUTO: 45.5 FL (ref 37–54)
EGFRCR SERPLBLD CKD-EPI 2021: 71.1 ML/MIN/1.73
EOSINOPHIL # BLD AUTO: 0.4 10*3/MM3 (ref 0–0.4)
EOSINOPHIL NFR BLD AUTO: 3.7 % (ref 0.3–6.2)
ERYTHROCYTE [DISTWIDTH] IN BLOOD BY AUTOMATED COUNT: 13.5 % (ref 12.3–15.4)
FLUAV SUBTYP SPEC NAA+PROBE: NOT DETECTED
FLUBV RNA ISLT QL NAA+PROBE: NOT DETECTED
GLUCOSE SERPL-MCNC: 143 MG/DL (ref 65–99)
HADV DNA SPEC NAA+PROBE: NOT DETECTED
HCOV 229E RNA SPEC QL NAA+PROBE: NOT DETECTED
HCOV HKU1 RNA SPEC QL NAA+PROBE: NOT DETECTED
HCOV NL63 RNA SPEC QL NAA+PROBE: NOT DETECTED
HCOV OC43 RNA SPEC QL NAA+PROBE: NOT DETECTED
HCT VFR BLD AUTO: 36.5 % (ref 34–46.6)
HGB BLD-MCNC: 12.6 G/DL (ref 12–15.9)
HMPV RNA NPH QL NAA+NON-PROBE: NOT DETECTED
HPIV1 RNA ISLT QL NAA+PROBE: NOT DETECTED
HPIV2 RNA SPEC QL NAA+PROBE: NOT DETECTED
HPIV3 RNA NPH QL NAA+PROBE: NOT DETECTED
HPIV4 P GENE NPH QL NAA+PROBE: NOT DETECTED
LYMPHOCYTES # BLD AUTO: 3.9 10*3/MM3 (ref 0.7–3.1)
LYMPHOCYTES NFR BLD AUTO: 35.2 % (ref 19.6–45.3)
M PNEUMO IGG SER IA-ACNC: NOT DETECTED
MCH RBC QN AUTO: 31.1 PG (ref 26.6–33)
MCHC RBC AUTO-ENTMCNC: 34.5 G/DL (ref 31.5–35.7)
MCV RBC AUTO: 90.1 FL (ref 79–97)
MONOCYTES # BLD AUTO: 0.9 10*3/MM3 (ref 0.1–0.9)
MONOCYTES NFR BLD AUTO: 8.1 % (ref 5–12)
NEUTROPHILS NFR BLD AUTO: 5.8 10*3/MM3 (ref 1.7–7)
NEUTROPHILS NFR BLD AUTO: 52.2 % (ref 42.7–76)
NRBC BLD AUTO-RTO: 0 /100 WBC (ref 0–0.2)
PLATELET # BLD AUTO: 275 10*3/MM3 (ref 140–450)
PMV BLD AUTO: 8.7 FL (ref 6–12)
POTASSIUM SERPL-SCNC: 4.7 MMOL/L (ref 3.5–5.2)
RBC # BLD AUTO: 4.05 10*6/MM3 (ref 3.77–5.28)
RHINOVIRUS RNA SPEC NAA+PROBE: NOT DETECTED
RSV RNA NPH QL NAA+NON-PROBE: NOT DETECTED
SARS-COV-2 RNA NPH QL NAA+NON-PROBE: NOT DETECTED
SODIUM SERPL-SCNC: 138 MMOL/L (ref 136–145)
THEOPHYLLINE SERPL-MCNC: 3.7 MCG/ML (ref 10–20)
TROPONIN T SERPL-MCNC: <0.01 NG/ML (ref 0–0.03)
WBC NRBC COR # BLD: 11.2 10*3/MM3 (ref 3.4–10.8)

## 2022-12-09 PROCEDURE — 70450 CT HEAD/BRAIN W/O DYE: CPT

## 2022-12-09 PROCEDURE — 84484 ASSAY OF TROPONIN QUANT: CPT | Performed by: EMERGENCY MEDICINE

## 2022-12-09 PROCEDURE — 0202U NFCT DS 22 TRGT SARS-COV-2: CPT | Performed by: EMERGENCY MEDICINE

## 2022-12-09 PROCEDURE — 80048 BASIC METABOLIC PNL TOTAL CA: CPT | Performed by: EMERGENCY MEDICINE

## 2022-12-09 PROCEDURE — 71045 X-RAY EXAM CHEST 1 VIEW: CPT

## 2022-12-09 PROCEDURE — 80198 ASSAY OF THEOPHYLLINE: CPT | Performed by: EMERGENCY MEDICINE

## 2022-12-09 PROCEDURE — 85025 COMPLETE CBC W/AUTO DIFF WBC: CPT | Performed by: EMERGENCY MEDICINE

## 2022-12-09 RX ADMIN — ACETAMINOPHEN 1000 MG: 500 TABLET ORAL at 00:39

## 2022-12-09 NOTE — ED NOTES
This RN and KARTHIK Worley helped pt get ready to leave, pt was asked by Brunilda who would drive her home. Pt stated her son Niall would come get her.

## 2022-12-09 NOTE — ED NOTES
Spoke with pts daughter Erin, daughter states she will call her brother to pick patient up. Pt currently in waiting room in wheel chair

## 2022-12-09 NOTE — ED NOTES
Called [facility] to get an update on patient's ride. It was unknowingly cancelled. Placed an order for another cab.

## 2022-12-09 NOTE — ED NOTES
Family still has not come to  pt, both daughter and son called x2. Neither picked up the phone. Charge nurse aware.

## 2022-12-09 NOTE — ED PROVIDER NOTES
"Subjective   History of Present Illness  Chief complaint: Fall     67-year-old female presents after a fall.  Patient states the fall occurred earlier today.  She states the fall was due to generalized weakness.  She states she has felt weak all day today.  She states she did hit the right side of her head but had no loss of consciousness.  She denies any other injuries.  She states she has had some mild cough and congestion for the last 2 weeks.  She denies any fever.  She has had no chest pain.  She reports mild shortness of breath but she states this is not far off of her baseline.  She does have a history of COPD.  She was able to get back up under her own power after the fall.  She denies any alleviating or exacerbating factors.  She denies any focal numbness, weakness, tingling.        History provided by:  Patient      Review of Systems   Constitutional: Negative for fever.   HENT: Positive for congestion. Negative for sore throat.    Eyes: Negative for redness.   Respiratory: Positive for cough and shortness of breath.    Cardiovascular: Negative for chest pain.   Gastrointestinal: Negative for abdominal pain, diarrhea and vomiting.   Genitourinary: Negative for dysuria.   Musculoskeletal: Negative for back pain.   Skin: Negative for rash.   Neurological: Positive for weakness. Negative for dizziness and headaches.   Psychiatric/Behavioral: Negative for confusion.       Past Medical History:   Diagnosis Date   • Anemia    • Anesthesia complication     states had chest tightness and SOA with \"gas\" anesthesia   • Arthritis    • Atrial fibrillation (HCC)     Proxysmal   • Chronic kidney disease    • Constipation    • COPD (chronic obstructive pulmonary disease) (HCC)    • Coronary artery disease    • Dark stools    • Depression    • GERD (gastroesophageal reflux disease)    • History of hernia repair    • Hyperlipidemia    • Hypertension    • Hypothyroid    • IBS (irritable bowel syndrome)    • Liver disease  " "  • Low back pain    • Obesity    • Panic attacks    • RLS (restless legs syndrome)    • Seizure disorder (HCC)    • Type 2 diabetes mellitus (HCC)    • Vitamin D deficiency    • Weight gain        Allergies   Allergen Reactions   • Adhesive Tape Rash     Paper tape causes the rash   • Contrast Dye GI Intolerance   • Iodinated Diagnostic Agents Nausea Only   • Latex Rash   • Penicillins Hives   • Statins Rash     BAD LEG CRAMPS     • Sulfa Antibiotics Hives       Past Surgical History:   Procedure Laterality Date   • BACK SURGERY      x 4   • CARDIAC CATHETERIZATION      [facility]straction [facility]: [facility]? 1980's, 3-16   • CHOLECYSTECTOMY     • COLONOSCOPY     • ENDOSCOPY  03/31/2015    Normal   • HERNIA REPAIR  01/19/2016    Dr Mota   • HYSTERECTOMY     • TONSILLECTOMY         Family History   Problem Relation Age of Onset   • Cancer Mother         Other Cancer   • Heart disease Mother    • Hypertension Mother    • Stroke Mother    • Hypertension Father    • Kidney disease Father    • Diabetes Paternal Uncle    • Cancer Paternal Uncle         Colon Cancer       Social History     Socioeconomic History   • Marital status: Single   Tobacco Use   • Smoking status: Every Day     Packs/day: 2.00     Types: Cigarettes   • Smokeless tobacco: Never   • Tobacco comments:     Passive Smoke: N   Vaping Use   • Vaping Use: Former   Substance and Sexual Activity   • Alcohol use: No   • Drug use: Not Currently     Comment: no cbd   • Sexual activity: Defer       /69   Pulse 103   Temp 98.1 °F (36.7 °C) (Oral)   Resp 16   Ht 162.6 cm (64\")   Wt 83.9 kg (185 lb)   SpO2 93%   BMI 31.76 kg/m²       Objective   Physical Exam  Vitals and nursing note reviewed.   Constitutional:       Appearance: Normal appearance. She is well-developed.   HENT:      Head: Normocephalic and atraumatic.   Eyes:      Pupils: Pupils are equal, round, and reactive to light.   Cardiovascular:      Rate and Rhythm: Normal rate and regular " rhythm.      Heart sounds: Normal heart sounds.   Pulmonary:      Effort: Pulmonary effort is normal. No respiratory distress.      Breath sounds: Normal breath sounds.   Abdominal:      General: Bowel sounds are normal.      Palpations: Abdomen is soft.      Tenderness: There is no abdominal tenderness.   Musculoskeletal:         General: No deformity or signs of injury. Normal range of motion.      Cervical back: Normal range of motion and neck supple.   Skin:     General: Skin is warm and dry.   Neurological:      Mental Status: She is alert and oriented to person, place, and time.      Comments: General weakness with no focal motor or sensory deficit         Procedures           ED Course      My interpretation of EKG shows sinus rhythm, rate of 99, no ST elevation     Results for orders placed or performed during the hospital encounter of 12/08/22   Respiratory Panel PCR w/COVID-19(SARS-CoV-2) SVETA/ELISABET/SHANNAN/PAD/COR/MAD/GARTH In-House, NP Swab in UTM/VTM, 3-4 HR TAT - Swab, Nasopharynx    Specimen: Nasopharynx; Swab   Result Value Ref Range    ADENOVIRUS, PCR Not Detected Not Detected    Coronavirus 229E Not Detected Not Detected    Coronavirus HKU1 Not Detected Not Detected    Coronavirus NL63 Not Detected Not Detected    Coronavirus OC43 Not Detected Not Detected    COVID19 Not Detected Not Detected - Ref. Range    Human Metapneumovirus Not Detected Not Detected    Human Rhinovirus/Enterovirus Not Detected Not Detected    Influenza A PCR Not Detected Not Detected    Influenza B PCR Not Detected Not Detected    Parainfluenza Virus 1 Not Detected Not Detected    Parainfluenza Virus 2 Not Detected Not Detected    Parainfluenza Virus 3 Not Detected Not Detected    Parainfluenza Virus 4 Not Detected Not Detected    RSV, PCR Not Detected Not Detected    Bordetella pertussis pcr Not Detected Not Detected    Bordetella parapertussis PCR Not Detected Not Detected    Chlamydophila pneumoniae PCR Not Detected Not Detected     Mycoplasma pneumo by PCR Not Detected Not Detected   Troponin    Specimen: Blood   Result Value Ref Range    Troponin T <0.010 0.000 - 0.030 ng/mL   Basic Metabolic Panel    Specimen: Blood   Result Value Ref Range    Glucose 143 (H) 65 - 99 mg/dL    BUN 32 (H) 8 - 23 mg/dL    Creatinine 0.90 0.57 - 1.00 mg/dL    Sodium 138 136 - 145 mmol/L    Potassium 4.7 3.5 - 5.2 mmol/L    Chloride 102 98 - 107 mmol/L    CO2 26.0 22.0 - 29.0 mmol/L    Calcium 10.4 8.6 - 10.5 mg/dL    BUN/Creatinine Ratio 35.6 (H) 7.0 - 25.0    Anion Gap 10.0 5.0 - 15.0 mmol/L    eGFR 71.1 >60.0 mL/min/1.73   Theophylline Level    Specimen: Blood   Result Value Ref Range    Theophylline Level 3.7 (L) 10.0 - 20.0 mcg/mL   CBC Auto Differential    Specimen: Blood   Result Value Ref Range    WBC 11.20 (H) 3.40 - 10.80 10*3/mm3    RBC 4.05 3.77 - 5.28 10*6/mm3    Hemoglobin 12.6 12.0 - 15.9 g/dL    Hematocrit 36.5 34.0 - 46.6 %    MCV 90.1 79.0 - 97.0 fL    MCH 31.1 26.6 - 33.0 pg    MCHC 34.5 31.5 - 35.7 g/dL    RDW 13.5 12.3 - 15.4 %    RDW-SD 45.5 37.0 - 54.0 fl    MPV 8.7 6.0 - 12.0 fL    Platelets 275 140 - 450 10*3/mm3    Neutrophil % 52.2 42.7 - 76.0 %    Lymphocyte % 35.2 19.6 - 45.3 %    Monocyte % 8.1 5.0 - 12.0 %    Eosinophil % 3.7 0.3 - 6.2 %    Basophil % 0.8 0.0 - 1.5 %    Neutrophils, Absolute 5.80 1.70 - 7.00 10*3/mm3    Lymphocytes, Absolute 3.90 (H) 0.70 - 3.10 10*3/mm3    Monocytes, Absolute 0.90 0.10 - 0.90 10*3/mm3    Eosinophils, Absolute 0.40 0.00 - 0.40 10*3/mm3    Basophils, Absolute 0.10 0.00 - 0.20 10*3/mm3    nRBC 0.0 0.0 - 0.2 /100 WBC   ECG 12 Lead Other; general weakness   Result Value Ref Range    QT Interval 361 ms     CT Head Without Contrast    Result Date: 12/9/2022  1. No acute intracranial process. Limited by motion. MRI is more sensitive for the detection of acute nonhemorrhagic infarct. 2. Mild changes small vessel ischemic disease of indeterminate age, presumably mostly chronic. Volume loss. Atherosclerosis.   Electronically signed by:  Mynor Phelps M.D.  12/8/2022 11:56 PM Mountain Time                          Chest x-ray reviewed by me shows no acute disease, pending radiology review.          MDM   Patient had the above evaluation.  Results were discussed with the patient.  Work-up has been unremarkable.  Chest x-ray shows no acute disease.  CT head shows no acute intracranial abnormality.  Respiratory panel is negative.  EKG shows no acute ischemia.  Troponin is negative.  White blood cell count is 11.2.  Patient is oxygenating well on room air.  She has a normal neurologic exam.  I see no indication for admission at this time.  Patient will be discharged to follow-up with her primary doctor.      Final diagnoses:   General weakness   Fall, initial encounter       ED Disposition  ED Disposition     ED Disposition   Discharge    Condition   Stable    Comment   --             Dottie Nunez, APRN  [address] [facility] [place] [address]  [phone]    Call in 2 days           Medication List      No changes were made to your prescriptions during this visit.          Ge Lehman MD  12/09/22 0211

## 2022-12-10 LAB — QT INTERVAL: 361 MS

## 2022-12-28 DIAGNOSIS — F34.1 DYSTHYMIA: Chronic | ICD-10-CM

## 2022-12-29 ENCOUNTER — TELEPHONE (OUTPATIENT)
Dept: CARDIOLOGY | Facility: CLINIC | Age: 67
End: 2022-12-29

## 2022-12-29 RX ORDER — VENLAFAXINE HYDROCHLORIDE 150 MG/1
CAPSULE, EXTENDED RELEASE ORAL
Qty: 60 CAPSULE | Refills: 0 | Status: SHIPPED | OUTPATIENT
Start: 2022-12-29 | End: 2023-01-23

## 2022-12-29 NOTE — TELEPHONE ENCOUNTER
Caller: Marlena Avilez    Relationship: Self    Best call back number:  [phone]     What is the best time to reach you: ANY    Who are you requesting to speak with (clinical staff, provider,  specific staff member): PROVIDER/CLINICAL STAFF    Do you know the name of the person who called: PT    What was the call regarding: PT WAS WANTING TO KNOW IF EDWARD ESCALERA COULD FAX A PRESCRIPTION FOR A LIFT CHAIR TO [facility], IN-TELE NUMBER [phone] AND FAX NUMBER [phone]- PLEASE CALL PT TO DISCUSS  PT WOULD ALSO LIKE TO GEMA ORDER FOR STRESS TEST ORDERED 11/15/22    Do you require a callback: YES

## 2022-12-29 NOTE — TELEPHONE ENCOUNTER
Patient informed- PCP will have to order the Lift chair for her. We cannot order this kind of DME.

## 2022-12-29 NOTE — TELEPHONE ENCOUNTER
PT CALLED BACK IN. SHE BELIEVES SHE GAVE AN INCORRECT PHONE NUMBER FOR THE [facility]. THE CORRECT PHONE NUMBER IS AS FOLLOWS:    [phone]

## 2023-01-12 ENCOUNTER — OFFICE VISIT (OUTPATIENT)
Dept: PSYCHIATRY | Facility: CLINIC | Age: 68
End: 2023-01-12
Payer: MEDICARE

## 2023-01-12 VITALS
BODY MASS INDEX: 33.09 KG/M2 | DIASTOLIC BLOOD PRESSURE: 68 MMHG | WEIGHT: 192.8 LBS | SYSTOLIC BLOOD PRESSURE: 122 MMHG | HEART RATE: 78 BPM | OXYGEN SATURATION: 96 %

## 2023-01-12 DIAGNOSIS — F41.1 GENERALIZED ANXIETY DISORDER: Chronic | ICD-10-CM

## 2023-01-12 DIAGNOSIS — F51.05 INSOMNIA DUE TO MENTAL CONDITION: Chronic | ICD-10-CM

## 2023-01-12 DIAGNOSIS — F33.1 MAJOR DEPRESSIVE DISORDER, RECURRENT EPISODE, MODERATE: Primary | Chronic | ICD-10-CM

## 2023-01-12 PROCEDURE — 90792 PSYCH DIAG EVAL W/MED SRVCS: CPT

## 2023-01-12 NOTE — PROGRESS NOTES
"Subjective   Marlena Avilez is a 67 y.o. female who presents today for follow-up for psychiatric medication management. Patient is new to this provider, but previously saw NASH Burkett.     Chief Complaint:  \"I had to go off my alprazolam because of my pain medicine.\"    History of Present Illness:     The patient last saw SATNAM Avilez on 6/9/22. At that time, she was taking geodon 40 mg nightly, wellbutrin , effexor XR 150mg, two daily for depression, gabapentin 300mg TID, topamax at 50 mg twice daily, and Xanax 0.5 mg TID prn anxiety    She went off her alprazolam because she is on pain medicine now due to back pain. Has been off for about 2 weeks. She is seeing Dr. Lui for pain management, on Hydrocodone. She is having a lot of back pain. They explained to her the risk of being on both, so she stopped the alprazolam.   Depression is not good.   Rates depression a 8 out of 10.   Not having any suicidal thoughts.  Depression has gotten worse since she was here last. She is very tearful.   Not sleeping well. Having trouble staying asleep and falling asleep. Only getting about 2 hours of sleep each night.   We discussed the [facility] testing since she has tried a lot of medication. She is agreeable to this. I let her know I would call her to make medication changes once it comes back.       Past medical history: anemia, arthritis, atrial fibrillation, chronic kidney disease, coronary artery disease, depression, GERD, hyperlipidemia, hypertension, hypothyroidism, irritable bowel syndrome, liver disease, low back pain, restless leg syndrome, seizure disorder, type 2 diabetes.   Past psychiatric history: depression, anxiety   Family history: None pertinent.   Social history: Patient is a current smoker, and he does not report any drug or alcohol use.     Ability and capacity to respond to treatment: Patient has tried a lot of different medications and her depression is still significant. She has the capacity to " "respond to treatment if we can find the correct medication.   Functional status: Poor  Prognosis: Fair  Long term goals: Improve depression and anxiety and overall quality of life.   Short term goals: Improve depression and improve her daily mood. Also to have less crying spells.   Strengths: She understands she needs help with her psychiatric conditions.   Weaknesses: She does not have a lot of support.     Patient presents with symptoms and behaviors that are consistent with the following DSM-5 diagnoses:  1. Major depressive disorder  2.  Generalized anxiety disorder  3. Insomnia    The following portions of the patient's history were reviewed and updated as appropriate: allergies, current medications, past family history, past medical history, past social history, past surgical history and problem list.    PAST OUTPATIENT TREATMENT  Diagnosis treated:  Affective Disorder, Anxiety/Panic Disorder  Treatment Type:  Individual Therapy, Medication Management  Prior Psychiatric Medications:  Citalopram  paroxetine  Sertraline  fluoxetine  Vistaril  Effexor  Abilify  Trazodone no help with sleep  Remeron no help with sleep  Prazosin  Elavil, no help with sleep  Clonazepam  Xanax  Wellbutrin  Topamax not helpful but didn't increase  Seroquel stopped it due to nausea  Ambien, no help  Lunesta, no help  Support Groups:  None  Sequelae Of Mental Disorder:  social isolation, emotional distress    Interval History  Deteriorated    Side Effects  None      Past Medical History:  Past Medical History:   Diagnosis Date   • Anemia    • Anesthesia complication     states had chest tightness and SOA with \"gas\" anesthesia   • Arthritis    • Atrial fibrillation (HCC)     Proxysmal   • Chronic kidney disease    • Constipation    • COPD (chronic obstructive pulmonary disease) (HCC)    • Coronary artery disease    • Dark stools    • Depression    • GERD (gastroesophageal reflux disease)    • History of hernia repair    • Hyperlipidemia  "   • Hypertension    • Hypothyroid    • IBS (irritable bowel syndrome)    • Liver disease    • Low back pain    • Obesity    • Panic attacks    • RLS (restless legs syndrome)    • Seizure disorder (HCC)    • Type 2 diabetes mellitus (HCC)    • Vitamin D deficiency    • Weight gain        Social History:  Social History     Socioeconomic History   • Marital status: Single   Tobacco Use   • Smoking status: Every Day     Packs/day: 2.00     Types: Cigarettes   • Smokeless tobacco: Never   • Tobacco comments:     Passive Smoke: N   Vaping Use   • Vaping Use: Former   Substance and Sexual Activity   • Alcohol use: No   • Drug use: Not Currently     Comment: no cbd   • Sexual activity: Defer       Family History:  Family History   Problem Relation Age of Onset   • Cancer Mother         Other Cancer   • Heart disease Mother    • Hypertension Mother    • Stroke Mother    • Hypertension Father    • Kidney disease Father    • Diabetes Paternal Uncle    • Cancer Paternal Uncle         Colon Cancer       Past Surgical History:  Past Surgical History:   Procedure Laterality Date   • BACK SURGERY      x 4   • CARDIAC [facility]HETERIZ[facility]      [facility], 3-16   • CHOLECYSTECTOMY     • COLONOSCOPY     • ENDOSCOPY  03/31/2015    Normal   • HERNIA REPAIR  01/19/2016    Dr Mota   • HYSTERECTOMY     • TONSILLECTOMY         Problem List:  Patient Active Problem List   Diagnosis   • Dysthymia   • Generalized anxiety disorder   • Arthralgia of left knee   • Lumbar spondylitis (HCC)   • Constipation due to pain medication   • Diabetic neuropathy (HCC)   • Leg pain, bilateral   • Cervical radiculopathy   • Low back pain   • Lumbago of lumbar region with sciatica   • Lumbar radiculopathy   • Spinal stenosis of lumbar region   • Lumbar spondylosis   • Neck pain   • Other long term (current) drug therapy   • Abnormal cardiovascular stress test   • Atrial fibrillation (HCC)   • Chronic obstructive pulmonary  disease ([facility])   • Congenital coronary artery fistula   • Congestive heart failure ([facility])   • Current every day smoker   • Fibromyositis   • Esophageal stricture   • Generalized abdominal pain   • History of tracheostomy   • Hyperlipidemia   • Hypertension   • Hypothyroidism   • Insomnia due to mental disorder   • Iron deficiency anemia   • Myofascial muscle pain   • Nausea   • Morbid obesity ([facility])   • Palpitations   • Renal insufficiency   • S/P lumbar fusion   • Seasonal allergies   • Symptom referrable to nervous system   • Tobacco abuse counseling   • Tobacco dependence syndrome   • Type 2 diabetes mellitus ([facility])   • Vitamin D deficiency   • Chronic back pain   • Depression   • Bronchitis   • Acute UTI (urinary tract infection)   • Somnolence   • Polypharmacy   • Acute urinary retention   • Chronic idiopathic constipation   • Periumbilical abdominal pain   • Peripheral edema   • Spondylolisthesis of lumbar region   • Pre-operative cardiovascular examination   • CKD (chronic kidney disease) stage 3, GFR 30-59 ml/min ([facility])   • Chest pain in adult   • Claudication ([facility])   • Mild coronary artery disease       Allergy:   Allergies   Allergen Reactions   • Adhesive Tape Rash     Paper tape causes the rash   • Contrast Dye (Echo Or Unknown Ct/Mr) GI Intolerance   • Iodinated Contrast Media Nausea Only   • Latex Rash   • Penicillins Hives   • Statins Rash     BAD LEG CRAMPS     • Sulfa Antibiotics Hives        Discontinued Medications:  There are no discontinued medications.    Current Medications:   Current Outpatient Medications   Medication Sig Dispense Refill   • Blood Glucose Calibration (OT ULTRA/FASTTK CNTRL SOLN) solution      • fluticasone-salmeterol (ADVAIR) 250-50 MCG/DOSE DISKUS Inhale 1 puff 2 (Two) Times a Day.     • furosemide (LASIX) 40 MG tablet Take 40 mg by mouth 2 (Two) Times a Day.     • insulin degludec (TRESIBA FLEXTOUCH) 100 UNIT/ML solution pen-injector injection Inject 50 Units under the skin  into the appropriate area as directed Daily.     • insulin lispro (humaLOG) 100 UNIT/ML injection Inject 6 Units under the skin into the appropriate area as directed Daily.     • isosorbide mononitrate (IMDUR) 30 MG 24 hr tablet Take 1 tablet by mouth Daily.     • Lanc[employer] Devices (OneTouch Delica Plus Lancing) misc      • levothyroxine (SYNTHROID, LEVOTHROID) 50 MCG tablet Take 50 mcg by mouth Daily.     • metoprolol tartrate (LOPRESSOR) 25 MG tablet Take 25 mg by mouth 2 (Two) Times a Day.     • MULTIPLE VITAMINS ESSENTIAL PO Take 1 tablet by mouth Daily.     • nitroglycerin (NITROSTAT) 0.4 MG SL tablet DISSOLVE 1 TABLET UNDER TONGUE EVERY 5 MINUTES AS NEEDED FOR CHEST PAIN. MAX: 3 TABLETS IN 15 MINUTES 25 tablet 4   • omeprazole (priLOSEC) 40 MG capsule Take 1 capsule by mouth Daily.     • OneTouch Ultra test strip      • pantoprazole (PROTONIX) 40 MG EC tablet Take 40 mg by mouth Daily.     • potassium chloride (K-DUR,KLOR-CON) 20 MEQ CR tablet Take 1 tablet by mouth Daily. 90 tablet 3   • pravastatin (PRAVACHOL) 40 MG tablet Take 1 tablet by mouth Every Night.     • pregabalin (LYRICA) 200 MG capsule 200 mg 2 (Two) Times a Day.     • promethazine (PHENERGAN) 25 MG tablet Take 25 mg by mouth Every 6 (Six) Hours As Needed. for nausea     • theophylline (THEODUR) 300 MG 12 hr tablet Take 300 mg by mouth Daily.     • Unifine Pentips 32G X 4 MM misc USE 3 TIMES DAILY WITH INSULIN INJECTIONS DX E11.65     • venlafaxine XR (EFFEXOR-XR) 150 MG 24 hr capsule TAKE 2 CAPSULES BY MOUTH EVERY DAY 60 capsule 0   • vitamin D (ERGOCALCIFEROL) 1.25 MG (27590 UT) capsule capsule Take 50,000 Units by mouth Every 30 (Thirty) Days.     • ALPRAZolam (XANAX) 0.5 MG tablet Take 1 tablet by mouth 3 (Three) Times a Day As Needed for Anxiety. 90 tablet 1   • ARIPiprazole (ABILIFY) 5 MG tablet Take 5 mg by mouth Daily.     • baclofen (LIORESAL) 10 MG tablet Take 1 tablet by mouth 3 (Three) Times a Day As Needed for Muscle Spasms. 90 tablet  2   • diphenoxylate-atropine (LOMOTIL) 2.5-0.025 MG per tablet Daily As Needed.     • ezetimibe (ZETIA) 10 MG tablet Take 10 mg by mouth Daily.     • lubiprostone (AMITIZA) 24 MCG capsule TAKE 1 CAPSULE BY MOUTH TWICE DAILY WITH MEALS 60 capsule 10   • mirtazapine (REMERON) 15 MG tablet TAKE 1 TABLET BY MOUTH EVERY NIGHT 28 tablet 1   • mometasone (NASONEX) 50 MCG/ACT nasal spray 2 sprays into the nostril(s) as directed by provider Daily.     • montelukast (SINGULAIR) 10 MG tablet Take 10 mg by mouth Every Night.     • naproxen (NAPROSYN) 375 MG tablet Take 1 tablet by mouth 2 (Two) Times a Day As Needed for Moderate Pain . 14 tablet 0   • sorbitol 70 % solution solution See Admin Instructions.     • tiZANidine (ZANAFLEX) 4 MG tablet Take 1.5 tablets by mouth Every 8 (Eight) Hours As Needed for Muscle Spasms. (Patient not taking: Reported on 10/12/2022) 135 tablet 11   • topiramate (TOPAMAX) 50 MG tablet TAKE 1 TABLET BY MOUTH TWICE DAILY (INCREASED DOSE INTO WILLARD PACKS) 56 tablet 1   • Tradjenta 5 MG tablet tablet Take 5 mg by mouth Daily.     • ziprasidone (GEODON) 80 MG capsule TAKE 1 CAPSULE BY MOUTH AT BEDTIME (INCREASED DOSE) 28 capsule 1     No current facility-administered medications for this visit.         Psychological ROS: positive for - anxiety, depression and sleep disturbances  negative for - behavioral disorder, concentration difficulties, decreased libido, disorientation, hallucinations, hostility, irritability, memory difficulties, mood swings, obsessive thoughts, physical abuse, sexual abuse or suicidal ideation      Physical Exam:   Blood pressure 122/68, pulse 78, weight 87.5 kg (192 lb 12.8 oz), SpO2 96 %, not currently breastfeeding.    Mental Status Exam:   Hygiene:   good  Cooperation:  Cooperative  Eye Contact:  Good  Psychomotor Behavior:  Appropriate  Affect:  Appropriate  Mood: depressed  Hopelessness: Denies  Speech:  Minimal  Thought Process:  Goal directed and Linear  Thought Content:   Normal  Suicidal:  None  Homicidal:  None  Hallucinations:  None  Delusion:  None  Memory:  Intact  Orientation:  Person, Place, Time and Situation  Reliability:  fair  Insight:  Fair  Judgement:  Fair  Impulse Control:  Fair  Physical/Medical Issues:  Yes back pain       PHQ-9 Depression Screening    Little interest or pleasure in doing things? 3-->nearly every day   Feeling down, depressed, or hopeless? 3-->nearly every day   Trouble falling or staying asleep, or sleeping too much? 2-->more than half the days   Feeling tired or having little energy? 2-->more than half the days   Poor appetite or overeating? 3-->nearly every day   Feeling bad about yourself - or that you are a failure or have let yourself or your family down? 3-->nearly every day   Trouble concentrating on things, such as reading the newspaper or watching television? 1-->several days   Moving or speaking so slowly that other people could have noticed? Or the opposite - being so fidgety or restless that you have been moving around a lot more than usual? 2-->more than half the days   Thoughts that you would be better off dead, or of hurting yourself in some way? 0-->not at all   PHQ-9 Total Score 19   If you checked off any problems, how difficult have these problems made it for you to do your work, take care of things at home, or get along with other people? somewhat difficult        Current every day smoker less than 3 minutes spent counseling Not agreeable to stopping    I advised Marlena of the risks of tobacco use.     Result Review:    Labs:  Admission on 12/08/2022, Discharged on 12/09/2022   Component Date Value Ref Range Status   • QT Interval 12/09/2022 361  ms Final   • Troponin T 12/09/2022 <0.010  0.000 - 0.030 ng/mL Final   • ADENOVIRUS, PCR 12/09/2022 Not Detected  Not Detected Final   • Coronavirus 229E 12/09/2022 Not Detected  Not Detected Final   • Coronavirus HKU1 12/09/2022 Not Detected  Not Detected Final   • Coronavirus NL63  12/09/2022 Not Detected  Not Detected Final   • Coronavirus OC43 12/09/2022 Not Detected  Not Detected Final   • COVID19 12/09/2022 Not Detected  Not Detected - Ref. Range Final   • Human Metapneumovirus 12/09/2022 Not Detected  Not Detected Final   • Human Rhinovirus/Enterovirus 12/09/2022 Not Detected  Not Detected Final   • Influenza A PCR 12/09/2022 Not Detected  Not Detected Final   • Influenza B PCR 12/09/2022 Not Detected  Not Detected Final   • Parainfluenza Virus 1 12/09/2022 Not Detected  Not Detected Final   • Parainfluenza Virus 2 12/09/2022 Not Detected  Not Detected Final   • Parainfluenza Virus 3 12/09/2022 Not Detected  Not Detected Final   • Parainfluenza Virus 4 12/09/2022 Not Detected  Not Detected Final   • RSV, PCR 12/09/2022 Not Detected  Not Detected Final   • Bordetella pertussis pcr 12/09/2022 Not Detected  Not Detected Final   • Bordetella parapertussis PCR 12/09/2022 Not Detected  Not Detected Final   • Chlamydophila pneumoniae PCR 12/09/2022 Not Detected  Not Detected Final   • Mycoplasma pneumo by PCR 12/09/2022 Not Detected  Not Detected Final   • Glucose 12/09/2022 143 (H)  65 - 99 mg/dL Final   • BUN 12/09/2022 32 (H)  8 - 23 mg/dL Final   • Creatinine 12/09/2022 0.90  0.57 - 1.00 mg/dL Final   • Sodium 12/09/2022 138  136 - 145 mmol/L Final   • Potassium 12/09/2022 4.7  3.5 - 5.2 mmol/L Final   • Chloride 12/09/2022 102  98 - 107 mmol/L Final   • CO2 12/09/2022 26.0  22.0 - 29.0 mmol/L Final   • Calcium 12/09/2022 10.4  8.6 - 10.5 mg/dL Final   • BUN/Creatinine Ratio 12/09/2022 35.6 (H)  7.0 - 25.0 Final   • Anion Gap 12/09/2022 10.0  5.0 - 15.0 mmol/L Final   • eGFR 12/09/2022 71.1  >60.0 mL/min/1.73 Final    National Kidney Foundation and American Society of Nephrology (ASN) Task Force recommended calculation based on the Chronic Kidney Disease Epidemiology Collaboration (CKD-EPI) equation refit without adjustment for race.   • Theophylline Level 12/09/2022 3.7 (L)  10.0 - 20.0  mcg/mL Final   • WBC 12/09/2022 11.20 (H)  3.40 - 10.80 10*3/mm3 Final   • RBC 12/09/2022 4.05  3.77 - 5.28 10*6/mm3 Final   • Hemoglobin 12/09/2022 12.6  12.0 - 15.9 g/dL Final   • Hematocrit 12/09/2022 36.5  34.0 - 46.6 % Final   • MCV 12/09/2022 90.1  79.0 - 97.0 fL Final   • MCH 12/09/2022 31.1  26.6 - 33.0 pg Final   • MCHC 12/09/2022 34.5  31.5 - 35.7 g/dL Final   • RDW 12/09/2022 13.5  12.3 - 15.4 % Final   • RDW-SD 12/09/2022 45.5  37.0 - 54.0 fl Final   • MPV 12/09/2022 8.7  6.0 - 12.0 fL Final   • Platelets 12/09/2022 275  140 - 450 10*3/mm3 Final   • Neutrophil % 12/09/2022 52.2  42.7 - 76.0 % Final   • Lymphocyte % 12/09/2022 35.2  19.6 - 45.3 % Final   • Monocyte % 12/09/2022 8.1  5.0 - 12.0 % Final   • Eosinophil % 12/09/2022 3.7  0.3 - 6.2 % Final   • Basophil % 12/09/2022 0.8  0.0 - 1.5 % Final   • Neutrophils, Absolute 12/09/2022 5.80  1.70 - 7.00 10*3/mm3 Final   • Lymphocytes, Absolute 12/09/2022 3.90 (H)  0.70 - 3.10 10*3/mm3 Final   • Monocytes, Absolute 12/09/2022 0.90  0.10 - 0.90 10*3/mm3 Final   • Eosinophils, Absolute 12/09/2022 0.40  0.00 - 0.40 10*3/mm3 Final   • Basophils, Absolute 12/09/2022 0.10  0.00 - 0.20 10*3/mm3 Final   • nRBC 12/09/2022 0.0  0.0 - 0.2 /100 WBC Final       Assessment & Plan   Diagnoses and all orders for this visit:    1. Major depressive disorder, recurrent episode, moderate (HCC) (Primary)    2. Generalized anxiety disorder  -     [facility] Full Urine Drug Screen -; Future  -     [facility] - Swab,; Future    3. Insomnia due to mental condition    Continue medications as they are prescribed for now. Once [facility] testing results return, I will call patient to discuss medication changes.      Visit Diagnoses:    ICD-10-CM ICD-9-CM   1. Major depressive disorder, recurrent episode, moderate (HCC)  F33.1 296.32   2. Generalized anxiety disorder  F41.1 300.02   3. Insomnia due to mental condition  F51.05 300.9     327.02       TREATMENT PLAN/GOALS: Continue supportive  psychotherapy efforts and medications as indicated. Treatment and medication options discussed during today's visit. Patient ackowledged and verbally consented to continue with current treatment plan and was educated on the importance of compliance with treatment and follow-up appointments.    MEDICATION ISSUES:  INSPECT reviewed as expected    Discussed medication options and treatment plan of prescribed medication as well as the risks, benefits, and side effects including potential falls, possible impaired driving and metabolic adversities among others. Patient is agreeable to call the office with any worsening of symptoms or onset of side effects. Patient is agreeable to call 911 or go to the nearest ER should he/she begin having SI/HI. No medication side effects or related complaints today.     MEDS ORDERED DURING VISIT:  No orders of the defined types were placed in this encounter.      Return in about 2 months (around 3/12/2023).         This document has been electronically signed by MONIK Craft  January 12, 2023 12:12 EST    Part of this note may be an electronic transcription/translation of spoken language to printed text using the Dragon Dictation System.

## 2023-01-17 ENCOUNTER — OFFICE VISIT (OUTPATIENT)
Dept: PSYCHIATRY | Facility: CLINIC | Age: 68
End: 2023-01-17
Payer: MEDICARE

## 2023-01-17 DIAGNOSIS — F33.1 MODERATE EPISODE OF RECURRENT MAJOR DEPRESSIVE DISORDER: ICD-10-CM

## 2023-01-17 DIAGNOSIS — F41.1 GENERALIZED ANXIETY DISORDER: Primary | Chronic | ICD-10-CM

## 2023-01-17 PROCEDURE — 90791 PSYCH DIAGNOSTIC EVALUATION: CPT | Performed by: SOCIAL WORKER
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
## 2023-01-23 ENCOUNTER — TELEPHONE (OUTPATIENT)
Dept: PSYCHIATRY | Facility: CLINIC | Age: 68
End: 2023-01-23
Payer: MEDICARE

## 2023-01-23 DIAGNOSIS — F33.1 MAJOR DEPRESSIVE DISORDER, RECURRENT EPISODE, MODERATE: ICD-10-CM

## 2023-01-23 DIAGNOSIS — F51.05 INSOMNIA DUE TO MENTAL CONDITION: Primary | ICD-10-CM

## 2023-01-23 RX ORDER — ZIPRASIDONE HYDROCHLORIDE 80 MG/1
80 CAPSULE ORAL NIGHTLY
Qty: 30 CAPSULE | Refills: 1 | Status: SHIPPED | OUTPATIENT
Start: 2023-01-23 | End: 2023-03-23

## 2023-01-23 RX ORDER — DULOXETIN HYDROCHLORIDE 60 MG/1
60 CAPSULE, DELAYED RELEASE ORAL DAILY
Qty: 30 CAPSULE | Refills: 0 | Status: SHIPPED | OUTPATIENT
Start: 2023-01-23 | End: 2023-01-30

## 2023-01-23 RX ORDER — TOPIRAMATE 50 MG/1
50 TABLET, FILM COATED ORAL 2 TIMES DAILY
Qty: 60 TABLET | Refills: 1 | Status: SHIPPED | OUTPATIENT
Start: 2023-01-23 | End: 2023-03-23

## 2023-01-23 RX ORDER — VENLAFAXINE HYDROCHLORIDE 75 MG/1
75 CAPSULE, EXTENDED RELEASE ORAL DAILY
Qty: 30 CAPSULE | Refills: 0 | Status: SHIPPED | OUTPATIENT
Start: 2023-01-23 | End: 2024-01-23

## 2023-01-23 NOTE — TELEPHONE ENCOUNTER
Called patient to discuss [employer] testing.   Will send Effexor 75mg prescription to taper off Effexor.   Will send Cymbalta prescription to start, which is appropriate according to [employer].   Sent other medication refills as well.

## 2023-01-24 ENCOUNTER — OFFICE VISIT (OUTPATIENT)
Dept: PSYCHIATRY | Facility: CLINIC | Age: 68
End: 2023-01-24
Payer: MEDICARE

## 2023-01-24 DIAGNOSIS — F33.1 MODERATE EPISODE OF RECURRENT MAJOR DEPRESSIVE DISORDER: Primary | ICD-10-CM

## 2023-01-24 PROCEDURE — 90834 PSYTX W PT 45 MINUTES: CPT | Performed by: SOCIAL WORKER

## 2023-01-24 NOTE — PROGRESS NOTES
"Date: January 30, 2023  Time In: 0903  Time Out: 0946      PROGRESS NOTE  Data:  Marlena Avilez is a 68 y.o. female who presents today for individual therapy session at [facility] with BIRD Howe, JAKE.     Patient Chief Complaint: \"A little bit depressed\" follow-up for anxiety and depression    Clinical Maneuvering/Intervention: Angeles is pleasant alert and oriented to person place and time.  She talked about how sad she is that she is estranged from her daughter and son.  We discussed boundaries and giving them space.    Additional social information:  Her son Niall has a son age 13, she could not recall his name  Her daughter Erin has a son, Donato.  She said that Erin has 3 other children that moved to [place] with their father when they were small children and neither she nor her daughter has not seen them since.     Assisted patient in processing above session content; acknowledged and normalized patient’s thoughts, feelings, and concerns. Rationalized patient thought process regarding boundaries and not being able to change other people. Discussed triggers associated with patient's depressed mood. Also discussed coping skills for patient to implement such as giving her family space for 1 week before texting or calling again unless she were to have an emergency.    Allowed patient to freely discuss issues without interruption or judgment. Provided safe, confidential environment to facilitate the development of positive therapeutic relationship and encourage open, honest communication. Assisted patient in identifying risk factors which would indicate the need for higher level of care including thoughts to harm self or others and/or self-harming behavior and encouraged patient to contact this office, call 911, or present to the nearest emergency room should any of these events occur. Discussed crisis intervention services and means to access. Patient adamantly and convincingly " denies current suicidal or homicidal ideation or perceptual disturbance.    Assessment   Patient appears to maintain relative stability as compared to their baseline. However, patient continues to struggle with depression which continues to cause impairment in important areas of functioning. A result, they can be reasonably expected to continue to benefit from treatment and would likely be at increased risk for decompensation otherwise.    Mental Status Exam:   Hygiene:   fair  Cooperation:  Cooperative  Eye Contact:  Downcast  Psychomotor Behavior:  Appropriate  Affect:  Blunted  Mood: depressed  Speech:  Minimal  Thought Process:  Goal directed and Linear  Thought Content:  Normal  Suicidal:  None  Homicidal:  None  Hallucinations:  None  Delusion:  None  Memory:  Intact  Orientation:  Person, Place, Time and Situation  Reliability:  fair  Insight:  Fair  Judgement:  Fair  Impulse Control:  Fair  Physical/Medical Issues:  see diagnoses list      PHQ-Score Total:  PHQ-9 Total Score: PHQ-9 Depression Screening  Little interest or pleasure in doing things? 3-->nearly every day   Feeling down, depressed, or hopeless? 3-->nearly every day   Trouble falling or staying asleep, or sleeping too much? 3-->nearly every day   Feeling tired or having little energy?     Poor appetite or overeating? 3-->nearly every day   Feeling bad about yourself - or that you are a failure or have let yourself or your family down? 3-->nearly every day   Trouble concentrating on things, such as reading the newspaper or watching television? 3-->nearly every day   Moving or speaking so slowly that other people could have noticed? Or the opposite - being so fidgety or restless that you have been moving around a lot more than usual? 0-->not at all   Thoughts that you would be better off dead, or of hurting yourself in some way? 0-->not at all   PHQ-9 Total Score 18   If you checked off any problems, how difficult have these problems made it for  you to do your work, take care of things at home, or get along with other people? somewhat difficult         JAHAIRA-7 Total Score:   Over the last two weeks, how often have you been bothered by the following problems?  Feeling nervous, anxious or on edge: Nearly every day  Not being able to stop or control worrying: More than half the days  Worrying too much about different things: Nearly every day  Trouble Relaxing: Nearly every day  Being so restless that it is hard to sit still: Nearly every day  Becoming easily annoyed or irritable: More than half the days  Feeling afraid as if something awful might happen: Nearly every day  JAHAIRA 7 Total Score: 19  If you checked any problems, how difficult have these problems made it for you to do your work, take care of things at home, or get along with other people: Somewhat difficult       Patient's Support Network Includes:  Friends: Familia and Ivonne     Functional Status: Moderate impairment     Progress toward goal: Not at goal    Prognosis: Good with Ongoing Treatment          Plan     Resources: Patient was provided with the following community resources: None at this time    Patient will continue in individual outpatient therapy with focus on improved functioning and coping skills, maintaining stability, and avoiding decompensation and the need for higher level of care.    Patient will adhere to any medication regimens as prescribed and report any side effects. Patient will contact this office, call 911 or present to the nearest emergency room should suicidal or homicidal ideations occur. Provide cognitive behavioral therapy and solution focused therapy to improve functioning, maintain stability and avoid decompensation and the need for higher level of care.     Return in about 2 weeks, or earlier if symptoms worsen or fail to improve.           VISIT DIAGNOSIS:     ICD-10-CM ICD-9-CM   1. Moderate episode of recurrent major depressive disorder (HCC)  F33.1 296.32             This document has been electronically signed by BIRD Howe, VALW   January 30, 2023 16:07 EST      Part of this note may be an electronic transcription/translation of spoken language to printed text using the Dragon Dictation System.

## 2023-01-26 ENCOUNTER — TELEPHONE (OUTPATIENT)
Dept: PSYCHIATRY | Facility: CLINIC | Age: 68
End: 2023-01-26
Payer: MEDICARE

## 2023-01-26 VITALS — HEART RATE: 90 BPM | DIASTOLIC BLOOD PRESSURE: 78 MMHG | HEIGHT: 63 IN | SYSTOLIC BLOOD PRESSURE: 117 MMHG

## 2023-01-26 DIAGNOSIS — F51.05 INSOMNIA DUE TO MENTAL CONDITION: Primary | ICD-10-CM

## 2023-01-26 DIAGNOSIS — F34.1 DYSTHYMIA: ICD-10-CM

## 2023-01-26 RX ORDER — BUPROPION HYDROCHLORIDE 300 MG/1
TABLET ORAL
Qty: 30 TABLET | Refills: 4 | OUTPATIENT
Start: 2023-01-26

## 2023-01-26 RX ORDER — MIRTAZAPINE 15 MG/1
15 TABLET, FILM COATED ORAL NIGHTLY
Qty: 28 TABLET | Refills: 0 | OUTPATIENT
Start: 2023-01-26

## 2023-01-26 NOTE — TELEPHONE ENCOUNTER
Patient called stated she still can not sleep and she thought you were suppose to send in some doxipen to help her sleep.

## 2023-01-27 DIAGNOSIS — F33.1 MAJOR DEPRESSIVE DISORDER, RECURRENT EPISODE, MODERATE: ICD-10-CM

## 2023-01-27 RX ORDER — VENLAFAXINE HYDROCHLORIDE 75 MG/1
CAPSULE, EXTENDED RELEASE ORAL
Qty: 30 CAPSULE | Refills: 0 | OUTPATIENT
Start: 2023-01-27

## 2023-01-27 RX ORDER — DULOXETIN HYDROCHLORIDE 60 MG/1
CAPSULE, DELAYED RELEASE ORAL
Qty: 30 CAPSULE | Refills: 0 | OUTPATIENT
Start: 2023-01-27

## 2023-01-27 RX ORDER — OLANZAPINE 10 MG/1
10 TABLET ORAL NIGHTLY
Qty: 30 TABLET | Refills: 2 | Status: SHIPPED | OUTPATIENT
Start: 2023-01-27 | End: 2024-01-27

## 2023-01-30 ENCOUNTER — TELEPHONE (OUTPATIENT)
Dept: PSYCHIATRY | Facility: CLINIC | Age: 68
End: 2023-01-30
Payer: MEDICARE

## 2023-01-30 RX ORDER — VILAZODONE HYDROCHLORIDE 10 MG/1
10 TABLET ORAL DAILY
Qty: 30 TABLET | Refills: 1 | Status: SHIPPED | OUTPATIENT
Start: 2023-01-30

## 2023-01-30 NOTE — TELEPHONE ENCOUNTER
Received call from patient stating the Cymbalta is making her itch really bad.  Yennifer Olivera NP notified.      Informed patient that she is to stop taking the Cymbalta and that a prescription for Viibryd 10mg a day was sent in.  Informed aptient she is to take this  Medication with food. The most common side effect is nausea, but it should resolve in 1-2 weeks. Keep taking it even if it makes her nauseous and the nausea will resolve. Food helps lessen it though. Patient v/u.

## 2023-01-31 ENCOUNTER — TELEPHONE (OUTPATIENT)
Dept: PSYCHIATRY | Facility: CLINIC | Age: 68
End: 2023-01-31
Payer: MEDICARE

## 2023-01-31 ENCOUNTER — PRIOR AUTHORIZATION (OUTPATIENT)
Dept: PSYCHIATRY | Facility: CLINIC | Age: 68
End: 2023-01-31
Payer: MEDICARE

## 2023-02-07 ENCOUNTER — TELEPHONE (OUTPATIENT)
Dept: PSYCHIATRY | Facility: CLINIC | Age: 68
End: 2023-02-07
Payer: MEDICARE

## 2023-02-07 NOTE — TELEPHONE ENCOUNTER
Patient called wanting to go back on her Xanax.  I read in her note that her pain management wanted her off the Xanax.  I told her that we would need permission from her pain management to be able to put her back on any controled Benzo.    She asked if there was any non controled medicine that would help with her panic attacks she has been having.    (I am working on an appeal with her Viibryd)

## 2023-02-09 NOTE — TELEPHONE ENCOUNTER
Patient called crying because she can not sleep and her depression is so bad that she was thinking about going to [facility].    When I questioned her about her Zyprexa and Geodon that she takes at night. She looked at her pill packs and stated that they were not in there and she has not gotten any bottles from [facility] pharmacy.    I called [facility] pharmacy, spoke with pharmacist Deidre, she states that the Zyprexa and Geodon were not added to the pill packs, if the patient does not bring them back and there needs to be a comment on the scripts to add them right away to the pack it does not get done.  I asked then if the Cymbalta was in the pill packs and the 15 mg Rffexor.  She checked and stated that they called the patient to bring in the packs and took out the Cymbalta and change the mg on the Effexor.   I asked why then was not the Zyprexa and Geodon put in, especially since the Geodon was a refill and not new. She had no answer for the Geodon. And stated again since there was no comment on the script they were going to wait until the next pill pack fill.  I explained that the patient needed her medicine right now, due to we all assumed that she was on the medicines correctly and now she is having all sorts of issues. When in fact she is having withdrawals and on no medicine at all.  Deidre stated she would get the medicine ready right now and give it to her in bottles.    I called patient and explained everything to her. She will go get the medicine and hope that she gets a good nights sleep. She will et me know how she is doing in a few days.    I also added a sticky note to help remind us about the comment in the scripts of any changes

## 2023-02-10 ENCOUNTER — TRANSCRIBE ORDERS (OUTPATIENT)
Dept: ADMINISTRATIVE | Facility: HOSPITAL | Age: 68
End: 2023-02-10
Payer: MEDICARE

## 2023-02-10 DIAGNOSIS — R10.32 LEFT LOWER QUADRANT ABDOMINAL PAIN: Primary | ICD-10-CM

## 2023-02-14 ENCOUNTER — HOSPITAL ENCOUNTER (OUTPATIENT)
Dept: CT IMAGING | Facility: HOSPITAL | Age: 68
Discharge: HOME | End: 2023-02-14
Admitting: NURSE PRACTITIONER
Payer: MEDICARE

## 2023-02-14 DIAGNOSIS — R10.32 LEFT LOWER QUADRANT ABDOMINAL PAIN: ICD-10-CM

## 2023-02-14 PROCEDURE — 74176 CT ABD & PELVIS W/O CONTRAST: CPT

## 2023-02-14 NOTE — TELEPHONE ENCOUNTER
Called Insurance company.  Spoke with Lizzy.  She stated that the Viibryd was approved through the Appeal 2/2/23.    She called pharmacy to inform them. They are filling today.    I called and left a message on patient VM

## 2023-02-17 ENCOUNTER — TELEPHONE (OUTPATIENT)
Dept: PSYCHIATRY | Facility: CLINIC | Age: 68
End: 2023-02-17
Payer: MEDICARE

## 2023-02-17 NOTE — TELEPHONE ENCOUNTER
Patient called stating she has been taking the Viibryd for a few days and she is not sleeping. She is taking the Geodon and Zyprexa at night.    I told her we had to be careful what we could give her with her pain management, we don't want to get her discharged from them, and her taking the Lyrica. And there has been several sleeping medicines that she had reactions to.

## 2023-02-20 NOTE — TELEPHONE ENCOUNTER
Spoke with patient. She wants to know if she goes off her pain medicine and goes on shots if she can get something for her panic attacks. I informed her we would need a letter from the pain management that it is ok with them and then I can present that to the Provider for consideration.  She states she feels a little better than Friday. I told her to keep taking the medications and she would start to improve since she has started feeling a little better.

## 2023-02-22 NOTE — TELEPHONE ENCOUNTER
Spoke with patient. She will hang in there and continue to take her medication until her appointment.

## 2023-02-24 DIAGNOSIS — F33.1 MAJOR DEPRESSIVE DISORDER, RECURRENT EPISODE, MODERATE: ICD-10-CM

## 2023-02-27 RX ORDER — VENLAFAXINE HYDROCHLORIDE 75 MG/1
CAPSULE, EXTENDED RELEASE ORAL
Qty: 30 CAPSULE | Refills: 0 | OUTPATIENT
Start: 2023-02-27

## 2023-03-01 DIAGNOSIS — F33.1 MAJOR DEPRESSIVE DISORDER, RECURRENT EPISODE, MODERATE: ICD-10-CM

## 2023-03-01 RX ORDER — VENLAFAXINE HYDROCHLORIDE 75 MG/1
CAPSULE, EXTENDED RELEASE ORAL
Qty: 30 CAPSULE | Refills: 0 | OUTPATIENT
Start: 2023-03-01

## 2023-03-02 ENCOUNTER — OFFICE VISIT (OUTPATIENT)
Dept: PSYCHIATRY | Facility: CLINIC | Age: 68
End: 2023-03-02
Payer: MEDICARE

## 2023-03-02 DIAGNOSIS — F33.1 MODERATE EPISODE OF RECURRENT MAJOR DEPRESSIVE DISORDER: ICD-10-CM

## 2023-03-02 DIAGNOSIS — F41.1 GENERALIZED ANXIETY DISORDER: Primary | Chronic | ICD-10-CM

## 2023-03-02 PROCEDURE — 90837 PSYTX W PT 60 MINUTES: CPT | Performed by: SOCIAL WORKER

## 2023-03-02 NOTE — PROGRESS NOTES
Date: March 2, 2023  Time In: 1309  Time Out: 1405      PROGRESS NOTE  Data:  Marlena Avilez is a 68 y.o. female who presents today for individual therapy session at [facility] with BIRD Howe, JAKE.     Patient Chief Complaint: Follow-up for depression and anxiety    Clinical Maneuvering/Intervention: Marlena is pleasant, alert and oriented to person place and time.  Marlena is cheerful today regarding the relationship that she has with her daughter and her son.  Her daughter remains estranged from her.  Her son has texted her once in the past 6 weeks.  She is having a difficult time dealing with this and we discussed how she is unable to control other people's actions.  She is also concerned about her health and she has questions about her medications.  She was unable to clearly state what problems she was having with her medications therefore JAKE called [facility] to see which psychotropic medications that she was on.  According to the pharmacist her Viibryd was discontinued she continues to be given her Zyprexa, Geodon, Topamax, as prescribed and her Effexor was discontinued.  Marlena said one medication made her heart race and she stopped taking it but she was unable to identify which medication.  She wants the medications to take away all of her depression and anxiety especially regarding her children.  We had a lengthy discussion that the medications that she was on was good and they were working health if she may not feel like they are if she has a bad day with her mood or her anxiety.  She gave verbal understanding.  She also worked well with reassurance and validation.  All of this above regarding her medications was reported to both Yennifer Olivera NP  and Barb ORELLANA MA.     Marlena is also concerned about a judgment that she has had against her within the legal system.  She is painting this as she is supposed to, and she was encouraged to keep pain according to the judgment.   She does have a neighbor, Nelida who is helping her through this.  =  Assisted patient in processing above session content; acknowledged and normalized patient’s thoughts, feelings, and concerns. Rationalized patient thought process regarding relationship with her children. Discussed triggers associated with patient's anxiety and depression. Also discussed coping skills for patient to implement such as relaxed breathing.    Allowed patient to freely discuss issues without interruption or judgment. Provided safe, confidential environment to facilitate the development of positive therapeutic relationship and encourage open, honest communication. Assisted patient in identifying risk factors which would indicate the need for higher level of care including thoughts to harm self or others and/or self-harming behavior and encouraged patient to contact this office, call 911, or present to the nearest emergency room should any of these events occur. Discussed crisis intervention services and means to access. Patient adamantly and convincingly denies current suicidal or homicidal ideation or perceptual disturbance.    Assessment   Patient appears to maintain relative stability as compared to their baseline. However, patient continues to struggle with depression and anxiety which continues to cause impairment in important areas of functioning. A result, they can be reasonably expected to continue to benefit from treatment and would likely be at increased risk for decompensation otherwise.    Mental Status Exam:   Hygiene:   good  Cooperation:  Cooperative  Eye Contact:  Fair  Psychomotor Behavior:  Slow  Affect:  Appropriate  Mood: depressed, anxious and tearful  Speech:  Monotone  Thought Process:  Linear  Thought Content:  Normal  Suicidal:  None  Homicidal:  None  Hallucinations:  None  Delusion:  None  Memory:  Intact  Orientation:  Person, Place, Time and Situation  Reliability:  good  Insight:  Good  Judgement:   Good  Impulse Control:  Good  Physical/Medical Issues:  See diagnosis list     PHQ-Score Total:  PHQ-9 Total Score: PHQ-9 Depression Screening  Little interest or pleasure in doing things? 3-->nearly every day   Feeling down, depressed, or hopeless? 3-->nearly every day   Trouble falling or staying asleep, or sleeping too much? 2-->more than half the days   Feeling tired or having little energy? 3-->nearly every day   Poor appetite or overeating? 3-->nearly every day   Feeling bad about yourself - or that you are a failure or have let yourself or your family down? 0-->not at all   Trouble concentrating on things, such as reading the newspaper or watching television? 2-->more than half the days   Moving or speaking so slowly that other people could have noticed? Or the opposite - being so fidgety or restless that you have been moving around a lot more than usual? 3-->nearly every day   Thoughts that you would be better off dead, or of hurting yourself in some way? 0-->not at all   PHQ-9 Total Score 19   If you checked off any problems, how difficult have these problems made it for you to do your work, take care of things at home, or get along with other people?        JAHAIRA-7 Total Score:   Over the last two weeks, how often have you been bothered by the following problems?  Feeling nervous, anxious or on edge: Nearly every day  Not being able to stop or control worrying: Nearly every day  Worrying too much about different things: Nearly every day  Trouble Relaxing: More than half the days  Being so restless that it is hard to sit still: Nearly every day  Becoming easily annoyed or irritable: Several days  Feeling afraid as if something awful might happen: Nearly every day  JAHAIRA 7 Total Score: 18     Patient's Support Network Includes:  Friend's Nelida and Ivonne    Functional Status: Moderate impairment     Progress toward goal: Not at goal    Prognosis: Good with Ongoing Treatment          Plan     Resources: Patient  was provided with the following community resources: None at this    Patient will continue in individual outpatient therapy with focus on improved functioning and coping skills, maintaining stability, and avoiding decompensation and the need for higher level of care.    Patient will adhere to any medication regimens as prescribed and report any side effects. Patient will contact this office, call 911 or present to the nearest emergency room should suicidal or homicidal ideations occur. Provide cognitive behavioral therapy and solution focused therapy to improve functioning, maintain stability and avoid decompensation and the need for higher level of care.     Return in about 4 weeks, or earlier if symptoms worsen or fail to improve.           VISIT DIAGNOSIS:     ICD-10-CM ICD-9-CM   1. Generalized anxiety disorder  F41.1 300.02   2. Moderate episode of recurrent major depressive disorder (HCC)  F33.1 296.32            This document has been electronically signed by BIRD Howe, VALW   March 2, 2023 14:27 EST      Part of this note may be an electronic transcription/translation of spoken language to printed text using the Dragon Dictation System.

## 2023-03-14 ENCOUNTER — TELEPHONE (OUTPATIENT)
Dept: PSYCHIATRY | Facility: CLINIC | Age: 68
End: 2023-03-14
Payer: MEDICARE

## 2023-03-14 NOTE — TELEPHONE ENCOUNTER
Tried calling patient, her VM is full    Patient called directly back when I was typing note.    Reminded her of her appointment tomorrow and to bring all her medicines, bottles and pill packs. Provider wants to go over all meds.  Patient stated understanding

## 2023-03-22 DIAGNOSIS — F33.1 MAJOR DEPRESSIVE DISORDER, RECURRENT EPISODE, MODERATE: ICD-10-CM

## 2023-03-22 NOTE — TELEPHONE ENCOUNTER
Office Visit with Yennifer Olivera APRN (01/12/2023)    Lets of telephone notes regarding medicines in chart

## 2023-03-23 RX ORDER — TOPIRAMATE 50 MG/1
TABLET, FILM COATED ORAL
Qty: 60 TABLET | Refills: 0 | Status: SHIPPED | OUTPATIENT
Start: 2023-03-23

## 2023-03-23 RX ORDER — ZIPRASIDONE HYDROCHLORIDE 80 MG/1
CAPSULE ORAL
Qty: 30 CAPSULE | Refills: 0 | Status: SHIPPED | OUTPATIENT
Start: 2023-03-23

## 2023-04-06 ENCOUNTER — OFFICE (AMBULATORY)
Dept: URBAN - METROPOLITAN AREA CLINIC 64 | Facility: CLINIC | Age: 68
End: 2023-04-06

## 2023-04-06 VITALS
SYSTOLIC BLOOD PRESSURE: 119 MMHG | DIASTOLIC BLOOD PRESSURE: 65 MMHG | HEIGHT: 63 IN | HEART RATE: 71 BPM | WEIGHT: 189 LBS

## 2023-04-06 DIAGNOSIS — F11.90 OPIOID USE, UNSPECIFIED, UNCOMPLICATED: ICD-10-CM

## 2023-04-06 DIAGNOSIS — R10.32 LEFT LOWER QUADRANT PAIN: ICD-10-CM

## 2023-04-06 DIAGNOSIS — G89.29 OTHER CHRONIC PAIN: ICD-10-CM

## 2023-04-06 DIAGNOSIS — K21.9 GASTRO-ESOPHAGEAL REFLUX DISEASE WITHOUT ESOPHAGITIS: ICD-10-CM

## 2023-04-06 DIAGNOSIS — J45.909 UNSPECIFIED ASTHMA, UNCOMPLICATED: ICD-10-CM

## 2023-04-06 DIAGNOSIS — Z79.4 LONG TERM (CURRENT) USE OF INSULIN: ICD-10-CM

## 2023-04-06 DIAGNOSIS — F17.200 NICOTINE DEPENDENCE, UNSPECIFIED, UNCOMPLICATED: ICD-10-CM

## 2023-04-06 DIAGNOSIS — J44.9 CHRONIC OBSTRUCTIVE PULMONARY DISEASE, UNSPECIFIED: ICD-10-CM

## 2023-04-06 DIAGNOSIS — R13.10 DYSPHAGIA, UNSPECIFIED: ICD-10-CM

## 2023-04-06 DIAGNOSIS — R11.0 NAUSEA: ICD-10-CM

## 2023-04-06 DIAGNOSIS — E11.9 TYPE 2 DIABETES MELLITUS WITHOUT COMPLICATIONS: ICD-10-CM

## 2023-04-06 DIAGNOSIS — K59.00 CONSTIPATION, UNSPECIFIED: ICD-10-CM

## 2023-04-06 PROCEDURE — 99213 OFFICE O/P EST LOW 20 MIN: CPT

## 2023-04-06 RX ORDER — PRUCALOPRIDE 2 MG/1
2 TABLET, FILM COATED ORAL
Qty: 30 | Refills: 11 | Status: ACTIVE
Start: 2023-04-06

## 2023-04-06 RX ORDER — LINACLOTIDE 290 UG/1
290 CAPSULE, GELATIN COATED ORAL
Qty: 30 | Refills: 11 | Status: ACTIVE
Start: 2023-04-06

## 2023-04-20 DIAGNOSIS — F33.1 MAJOR DEPRESSIVE DISORDER, RECURRENT EPISODE, MODERATE: ICD-10-CM

## 2023-04-21 NOTE — TELEPHONE ENCOUNTER
Office Visit with Yennifer Olivera APRN (01/12/2023)    There is no future appt with you nor Agueda. She has canceled all appt for last month and this month. She is not answering her phone or returning calls

## 2023-04-24 ENCOUNTER — TELEPHONE (OUTPATIENT)
Dept: PSYCHIATRY | Facility: CLINIC | Age: 68
End: 2023-04-24
Payer: MEDICARE

## 2023-04-24 NOTE — TELEPHONE ENCOUNTER
Per provider request I tried calling patient to find out if she was going to continue to come to this office for medicine management or go to another office. We received medication refills. She has canceled all her appointments with this office. VM full.    As I was typing, patient called back. Another person answered call. They put her on hold, as I went to  phone, the number disconnected. I tried calling back and the VM is full.  
Negative

## 2023-05-01 RX ORDER — TOPIRAMATE 50 MG/1
TABLET, FILM COATED ORAL
Qty: 60 TABLET | Refills: 0 | OUTPATIENT
Start: 2023-05-01

## 2023-05-01 RX ORDER — ZIPRASIDONE HYDROCHLORIDE 80 MG/1
CAPSULE ORAL
Qty: 30 CAPSULE | Refills: 0 | OUTPATIENT
Start: 2023-05-01

## 2023-05-01 RX ORDER — OLANZAPINE 10 MG/1
TABLET ORAL
Qty: 30 TABLET | Refills: 1 | OUTPATIENT
Start: 2023-05-01

## 2023-05-19 RX ORDER — LUBIPROSTONE 24 UG/1
CAPSULE ORAL
Qty: 60 CAPSULE | Refills: 9 | OUTPATIENT
Start: 2023-05-19

## 2023-05-25 RX ORDER — LUBIPROSTONE 24 UG/1
CAPSULE ORAL
Qty: 60 CAPSULE | Refills: 9 | OUTPATIENT
Start: 2023-05-25

## 2023-05-31 ENCOUNTER — TELEPHONE (OUTPATIENT)
Dept: CARDIOLOGY | Facility: CLINIC | Age: 68
End: 2023-05-31

## 2023-05-31 NOTE — TELEPHONE ENCOUNTER
Caller: Marlena Avielz    Relationship: Self    Best call back number: [phone]    What is the best time to reach you: ANYTIME    Who are you requesting to speak with (clinical staff, provider,  specific staff member): ANYONE    What was the call regarding: PT IS CALLING TO SEE IF SHE CAN BE SEEN SOONER BECAUSE OF HER CHEST PAIN AND FAST HEART RATE.    Is it okay if the provider responds through MyChart: PT WOULD LIKE A CALL BACK.

## 2023-05-31 NOTE — TELEPHONE ENCOUNTER
Have tried to reach pt, unable. Mailbox is full.       OKAY FOR [facility] TO SCHEDULE PT with either of our NP's.

## 2023-06-01 ENCOUNTER — TELEPHONE (OUTPATIENT)
Dept: CARDIOLOGY | Facility: CLINIC | Age: 68
End: 2023-06-01

## 2023-06-01 NOTE — TELEPHONE ENCOUNTER
Attempted to call patient back again- she did not answer and VM is full. Patient needs to schedule an appt    OK for [facility] to schedule

## 2023-06-01 NOTE — TELEPHONE ENCOUNTER
Hub staff attempted to follow warm transfer process and was unsuccessful     Caller: Marlena Avilez    Relationship to patient: Self    Best call back number: [phone]    Patient is needing: PATIENT STATING THAT HER BP /102

## 2023-06-01 NOTE — TELEPHONE ENCOUNTER
Per Chelly- BP in office has been good historically- is there something going on (stress, pain etc..)  She does not want to change anything based on 1 reading     I attempted to call back- VM is full- cannot leave a message.     OK for [facility] to relay

## 2023-06-14 RX ORDER — LUBIPROSTONE 24 UG/1
CAPSULE ORAL
Qty: 60 CAPSULE | Refills: 9 | OUTPATIENT
Start: 2023-06-14

## 2023-07-11 ENCOUNTER — TELEPHONE (OUTPATIENT)
Dept: CARDIOLOGY | Facility: CLINIC | Age: 68
End: 2023-07-11

## 2023-07-11 NOTE — TELEPHONE ENCOUNTER
Caller: Marlena Avilez    Relationship to patient: Self    Best call back number: [phone]     Chief complaint: IRREGULAR HEARTBEAT    Type of visit: FU    Requested date: TRANSPORTATION NEEDS TWO DAY NOTICE    If rescheduling, when is the original appointment: 08.01.2023    Additional notes: PT STATES THAT HER HEART HAS BEEN BEATING FUNNY THE LAST TWO DAYS - PT DENIES CHEST PAIN AND SOB WHILE ON PHONE BUT STATES HER HEART WAS STILL BEATING WEIRD

## 2023-08-28 PROBLEM — I25.10 NONOBSTRUCTIVE ATHEROSCLEROSIS OF CORONARY ARTERY: Status: ACTIVE | Noted: 2023-08-28

## 2023-08-28 PROBLEM — E78.5 DYSLIPIDEMIA: Status: ACTIVE | Noted: 2023-02-13

## 2023-08-28 PROBLEM — K21.9 GASTROESOPHAGEAL REFLUX DISEASE: Status: ACTIVE | Noted: 2023-02-13

## 2023-08-28 PROBLEM — R33.9 INCOMPLETE EMPTYING OF BLADDER: Status: ACTIVE | Noted: 2023-02-13

## 2023-08-28 PROBLEM — F41.9 ANXIETY: Status: ACTIVE | Noted: 2023-02-13

## 2023-08-28 PROBLEM — M51.36 DEGENERATION OF LUMBAR INTERVERTEBRAL DISC: Status: ACTIVE | Noted: 2021-11-09
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
## 2023-08-28 PROBLEM — G25.81 RLS (RESTLESS LEGS SYNDROME): Status: ACTIVE | Noted: 2023-08-28

## 2023-08-28 PROBLEM — Z72.0 TOBACCO USE: Status: ACTIVE | Noted: 2023-02-13

## 2023-08-28 PROBLEM — G43.909 MIGRAINE: Status: ACTIVE | Noted: 2021-11-09

## 2023-08-28 RX ORDER — LUMATEPERONE 42 MG/1
CAPSULE ORAL
COMMUNITY

## 2023-08-28 RX ORDER — BUPROPION HYDROCHLORIDE 150 MG/1
1 TABLET ORAL
COMMUNITY
Start: 2023-03-30

## 2023-08-28 RX ORDER — RIMEGEPANT SULFATE 75 MG/75MG
TABLET, ORALLY DISINTEGRATING ORAL
COMMUNITY

## 2023-08-28 RX ORDER — DULOXETIN HYDROCHLORIDE 30 MG/1
CAPSULE, DELAYED RELEASE ORAL
COMMUNITY

## 2023-08-28 RX ORDER — TEMAZEPAM 7.5 MG/1
CAPSULE ORAL
COMMUNITY
Start: 2023-07-24

## 2023-08-28 RX ORDER — FLUTICASONE PROPIONATE 50 MCG
SPRAY, SUSPENSION (ML) NASAL
COMMUNITY

## 2023-08-28 RX ORDER — TAMSULOSIN HYDROCHLORIDE 0.4 MG/1
0.4 CAPSULE ORAL DAILY
Qty: 30 CAPSULE | Refills: 11 | COMMUNITY
Start: 2023-02-10 | End: 2024-02-10

## 2023-08-28 RX ORDER — QUETIAPINE 150 MG/1
TABLET, FILM COATED, EXTENDED RELEASE ORAL
COMMUNITY

## 2023-08-28 RX ORDER — DOXYCYCLINE HYCLATE 100 MG/1
CAPSULE ORAL
COMMUNITY

## 2023-08-28 RX ORDER — HYDROXYZINE PAMOATE 25 MG/1
CAPSULE ORAL
COMMUNITY

## 2023-08-28 RX ORDER — PREGABALIN 150 MG/1
CAPSULE ORAL
COMMUNITY

## 2023-08-28 RX ORDER — GABAPENTIN 300 MG/1
CAPSULE ORAL
COMMUNITY

## 2023-08-28 RX ORDER — PRAVASTATIN SODIUM 80 MG/1
TABLET ORAL
COMMUNITY
Start: 2023-08-09

## 2023-08-28 RX ORDER — POLYETHYLENE GLYCOL 3350 17 G/17G
17 POWDER, FOR SOLUTION ORAL
COMMUNITY
Start: 2023-03-28

## 2023-08-28 RX ORDER — METRONIDAZOLE 500 MG/1
TABLET ORAL
COMMUNITY

## 2023-08-28 RX ORDER — ZIPRASIDONE HYDROCHLORIDE 40 MG/1
CAPSULE ORAL
COMMUNITY

## 2023-08-28 RX ORDER — ICOSAPENT ETHYL 1000 MG/1
CAPSULE ORAL EVERY 12 HOURS SCHEDULED
COMMUNITY

## 2023-08-28 RX ORDER — LEVOCETIRIZINE DIHYDROCHLORIDE 5 MG/1
TABLET, FILM COATED ORAL
COMMUNITY

## 2023-08-28 RX ORDER — CETIRIZINE HYDROCHLORIDE 10 MG/1
TABLET ORAL
COMMUNITY
Start: 2023-08-10

## 2023-08-28 RX ORDER — VENLAFAXINE HYDROCHLORIDE 225 MG/1
TABLET, EXTENDED RELEASE ORAL
COMMUNITY

## 2023-08-28 RX ORDER — QUETIAPINE FUMARATE 50 MG/1
1 TABLET, EXTENDED RELEASE ORAL
COMMUNITY
Start: 2023-04-04

## 2023-08-28 RX ORDER — DEXTROMETHORPHAN HYDROBROMIDE AND PROMETHAZINE HYDROCHLORIDE 15; 6.25 MG/5ML; MG/5ML
5 SYRUP ORAL EVERY 12 HOURS SCHEDULED
COMMUNITY

## 2023-08-28 RX ORDER — BENZONATATE 100 MG/1
CAPSULE ORAL EVERY 8 HOURS SCHEDULED
COMMUNITY

## 2023-08-28 RX ORDER — OXYCODONE HYDROCHLORIDE 15 MG/1
TABLET ORAL
COMMUNITY

## 2023-08-28 RX ORDER — FLUCONAZOLE 150 MG/1
TABLET ORAL
COMMUNITY

## 2023-08-28 RX ORDER — PANTOPRAZOLE SODIUM 40 MG/1
1 TABLET, DELAYED RELEASE ORAL DAILY
COMMUNITY

## 2023-08-28 RX ORDER — IPRATROPIUM BROMIDE AND ALBUTEROL SULFATE 2.5; .5 MG/3ML; MG/3ML
SOLUTION RESPIRATORY (INHALATION)
COMMUNITY
Start: 2022-11-02

## 2023-08-28 RX ORDER — TRAZODONE HYDROCHLORIDE 100 MG/1
1 TABLET ORAL
COMMUNITY
Start: 2023-04-05

## 2023-08-28 RX ORDER — DOXYCYCLINE 100 MG/1
TABLET ORAL
COMMUNITY

## 2023-08-28 RX ORDER — EZETIMIBE 10 MG/1
1 TABLET ORAL DAILY
COMMUNITY
Start: 2023-02-28

## 2023-08-28 RX ORDER — OLANZAPINE 10 MG/1
1 TABLET ORAL
COMMUNITY
Start: 2023-03-24

## 2023-08-28 RX ORDER — DIAZEPAM 5 MG/1
TABLET ORAL
COMMUNITY

## 2023-08-28 RX ORDER — METHOCARBAMOL 750 MG/1
TABLET, FILM COATED ORAL
COMMUNITY

## 2023-08-28 RX ORDER — ISOSORBIDE MONONITRATE 30 MG/1
TABLET, EXTENDED RELEASE ORAL
COMMUNITY
Start: 2023-08-09

## 2023-08-28 RX ORDER — METFORMIN HYDROCHLORIDE 500 MG/1
TABLET, EXTENDED RELEASE ORAL
COMMUNITY

## 2023-08-28 RX ORDER — VENLAFAXINE HYDROCHLORIDE 150 MG/1
CAPSULE, EXTENDED RELEASE ORAL
COMMUNITY
Start: 2023-08-07

## 2023-08-28 RX ORDER — PEG-3350, SODIUM SULFATE, SODIUM CHLORIDE, POTASSIUM CHLORIDE, SODIUM ASCORBATE AND ASCORBIC ACID 7.5-2.691G
KIT ORAL
COMMUNITY

## 2023-08-28 RX ORDER — ALBUTEROL SULFATE 90 UG/1
AEROSOL, METERED RESPIRATORY (INHALATION)
COMMUNITY

## 2023-08-28 RX ORDER — ROPINIROLE 0.5 MG/1
TABLET, FILM COATED ORAL
COMMUNITY
Start: 2023-08-09

## 2023-08-28 RX ORDER — HYDROXYZINE HYDROCHLORIDE 25 MG/1
1 TABLET, FILM COATED ORAL
COMMUNITY
Start: 2023-03-30

## 2023-08-28 RX ORDER — TOPIRAMATE 25 MG/1
TABLET ORAL
COMMUNITY

## 2023-08-28 RX ORDER — OXYCODONE AND ACETAMINOPHEN 10; 325 MG/1; MG/1
TABLET ORAL
COMMUNITY

## 2023-08-28 RX ORDER — CLINDAMYCIN PHOSPHATE 20 MG/G
CREAM VAGINAL
COMMUNITY

## 2023-08-28 RX ORDER — QUETIAPINE FUMARATE 25 MG/1
TABLET, FILM COATED ORAL
COMMUNITY

## 2023-08-28 RX ORDER — PREGABALIN 100 MG/1
CAPSULE ORAL
COMMUNITY

## 2023-08-28 RX ORDER — LISINOPRIL 2.5 MG/1
TABLET ORAL EVERY 24 HOURS
COMMUNITY

## 2023-08-28 RX ORDER — DICYCLOMINE HYDROCHLORIDE 10 MG/1
1 CAPSULE ORAL
COMMUNITY
Start: 2023-04-10

## 2023-08-28 RX ORDER — NALOXEGOL OXALATE 25 MG/1
TABLET, FILM COATED ORAL
COMMUNITY
Start: 2023-05-30

## 2023-08-28 RX ORDER — TRIAMCINOLONE ACETONIDE 1 MG/G
CREAM TOPICAL
COMMUNITY
Start: 2023-05-17

## 2023-08-28 RX ORDER — NITROFURANTOIN 25; 75 MG/1; MG/1
CAPSULE ORAL
COMMUNITY

## 2023-08-28 RX ORDER — CIPROFLOXACIN 500 MG/1
TABLET, FILM COATED ORAL
COMMUNITY
Start: 2023-05-03

## 2023-09-01 ENCOUNTER — TELEPHONE (OUTPATIENT)
Dept: CARDIOLOGY | Facility: CLINIC | Age: 68
End: 2023-09-01
Payer: MEDICARE

## 2023-09-01 NOTE — TELEPHONE ENCOUNTER
Hub staff attempted to follow warm transfer process and was unsuccessful     Caller: Marlena Avilez    Relationship to patient: Self    Best call back number: [phone]    Patient is needing: PATIENT STATED THAT SHE IS HAVING SWELLING IN HER ANKLES, FEET AND FACE. SHE CALLED [facility] AND WAS ADVISED TO CONTACT L[facility]A [facility]. PLEASE CONTACT PATIENT. THANK YOU.

## 2023-09-01 NOTE — TELEPHONE ENCOUNTER
SPOKE WITH PT AND AFTER DISCUSSING HER SYMPTOMS ADVISED HER TO GO TO THE ER.  SHE VOICED UNDERSTANDING,     Hub staff attempted to follow warm transfer process and was unsuccessful      Caller: Marlena Avilez     Relationship to patient: Self     Best call back number: [phone]     Patient is needing: PATIENT STATED THAT SHE IS HAVING SWELLING IN HER ANKLES, FEET AND FACE. SHE CALLED [facility] AND WAS ADVISED TO CONTACT LORA RU[facility]. PLEASE CONTACT PATIENT. THANK YOU.

## 2023-10-24 ENCOUNTER — OFFICE (AMBULATORY)
Dept: URBAN - METROPOLITAN AREA CLINIC 64 | Facility: CLINIC | Age: 68
End: 2023-10-24
Payer: MEDICAID

## 2023-10-24 VITALS
DIASTOLIC BLOOD PRESSURE: 77 MMHG | WEIGHT: 188 LBS | HEART RATE: 85 BPM | HEIGHT: 63 IN | SYSTOLIC BLOOD PRESSURE: 128 MMHG

## 2023-10-24 DIAGNOSIS — R10.30 LOWER ABDOMINAL PAIN, UNSPECIFIED: ICD-10-CM

## 2023-10-24 DIAGNOSIS — K92.1 MELENA: ICD-10-CM

## 2023-10-24 DIAGNOSIS — Z86.010 PERSONAL HISTORY OF COLONIC POLYPS: ICD-10-CM

## 2023-10-24 DIAGNOSIS — R13.10 DYSPHAGIA, UNSPECIFIED: ICD-10-CM

## 2023-10-24 PROCEDURE — 99214 OFFICE O/P EST MOD 30 MIN: CPT

## 2023-10-24 RX ORDER — ONDANSETRON 4 MG/1
12 TABLET, ORALLY DISINTEGRATING ORAL
Qty: 45 | Refills: 4 | Status: ACTIVE
Start: 2023-10-24

## 2023-11-13 ENCOUNTER — ON CAMPUS - OUTPATIENT (AMBULATORY)
Dept: URBAN - METROPOLITAN AREA HOSPITAL 85 | Facility: HOSPITAL | Age: 68
End: 2023-11-13
Payer: MEDICAID

## 2023-11-13 ENCOUNTER — ANESTHESIA EVENT (OUTPATIENT)
Dept: GASTROENTEROLOGY | Facility: HOSPITAL | Age: 68
End: 2023-11-13
Payer: MEDICARE

## 2023-11-13 ENCOUNTER — ANESTHESIA (OUTPATIENT)
Dept: GASTROENTEROLOGY | Facility: HOSPITAL | Age: 68
End: 2023-11-13
Payer: MEDICARE

## 2023-11-13 ENCOUNTER — HOSPITAL ENCOUNTER (OUTPATIENT)
Facility: HOSPITAL | Age: 68
Setting detail: HOSPITAL OUTPATIENT SURGERY
Discharge: HOME | End: 2023-11-13
Attending: INTERNAL MEDICINE | Admitting: INTERNAL MEDICINE
Payer: MEDICARE

## 2023-11-13 VITALS — DIASTOLIC BLOOD PRESSURE: 97 MMHG | HEART RATE: 72 BPM | SYSTOLIC BLOOD PRESSURE: 115 MMHG | OXYGEN SATURATION: 100 %

## 2023-11-13 VITALS
WEIGHT: 186 LBS | DIASTOLIC BLOOD PRESSURE: 57 MMHG | TEMPERATURE: 98.4 F | SYSTOLIC BLOOD PRESSURE: 118 MMHG | HEIGHT: 65 IN | RESPIRATION RATE: 16 BRPM | OXYGEN SATURATION: 95 % | HEART RATE: 75 BPM | BODY MASS INDEX: 30.99 KG/M2

## 2023-11-13 DIAGNOSIS — R10.9 UNSPECIFIED ABDOMINAL PAIN: ICD-10-CM

## 2023-11-13 DIAGNOSIS — K31.89 OTHER DISEASES OF STOMACH AND DUODENUM: ICD-10-CM

## 2023-11-13 DIAGNOSIS — R13.10 DYSPHAGIA, UNSPECIFIED: ICD-10-CM

## 2023-11-13 DIAGNOSIS — R13.10 DYSPHAGIA: ICD-10-CM

## 2023-11-13 DIAGNOSIS — Z86.010 PERSONAL HISTORY OF COLONIC POLYPS: ICD-10-CM

## 2023-11-13 DIAGNOSIS — K92.1 MELENA: ICD-10-CM

## 2023-11-13 DIAGNOSIS — D12.0 BENIGN NEOPLASM OF CECUM: ICD-10-CM

## 2023-11-13 DIAGNOSIS — K25.9 GASTRIC ULCER, UNSPECIFIED AS ACUTE OR CHRONIC, WITHOUT HEMO: ICD-10-CM

## 2023-11-13 DIAGNOSIS — Z09 ENCOUNTER FOR FOLLOW-UP EXAMINATION AFTER COMPLETED TREATMEN: ICD-10-CM

## 2023-11-13 DIAGNOSIS — R10.9 ABDOMINAL PAIN: ICD-10-CM

## 2023-11-13 DIAGNOSIS — D12.2 BENIGN NEOPLASM OF ASCENDING COLON: ICD-10-CM

## 2023-11-13 DIAGNOSIS — D12.5 BENIGN NEOPLASM OF SIGMOID COLON: ICD-10-CM

## 2023-11-13 DIAGNOSIS — D12.4 BENIGN NEOPLASM OF DESCENDING COLON: ICD-10-CM

## 2023-11-13 LAB — GLUCOSE BLDC GLUCOMTR-MCNC: 86 MG/DL (ref 70–105)

## 2023-11-13 PROCEDURE — 82948 REAGENT STRIP/BLOOD GLUCOSE: CPT

## 2023-11-13 PROCEDURE — 25010000002 PROPOFOL 10 MG/ML EMULSION: Performed by: NURSE ANESTHETIST, CERTIFIED REGISTERED

## 2023-11-13 PROCEDURE — 43450 DILATE ESOPHAGUS 1/MULT PASS: CPT | Performed by: INTERNAL MEDICINE

## 2023-11-13 PROCEDURE — 25810000003 SODIUM CHLORIDE 0.9 % SOLUTION: Performed by: NURSE ANESTHETIST, CERTIFIED REGISTERED

## 2023-11-13 PROCEDURE — 45385 COLONOSCOPY W/LESION REMOVAL: CPT | Mod: PT | Performed by: INTERNAL MEDICINE

## 2023-11-13 PROCEDURE — 43239 EGD BIOPSY SINGLE/MULTIPLE: CPT | Performed by: INTERNAL MEDICINE

## 2023-11-13 PROCEDURE — 88305 TISSUE EXAM BY PATHOLOGIST: CPT | Performed by: INTERNAL MEDICINE

## 2023-11-13 RX ORDER — PROPOFOL 10 MG/ML
VIAL (ML) INTRAVENOUS AS NEEDED
Status: DISCONTINUED | OUTPATIENT
Start: 2023-11-13 | End: 2023-11-13 | Stop reason: SURG

## 2023-11-13 RX ORDER — SODIUM CHLORIDE 9 MG/ML
INJECTION, SOLUTION INTRAVENOUS CONTINUOUS PRN
Status: DISCONTINUED | OUTPATIENT
Start: 2023-11-13 | End: 2023-11-13 | Stop reason: SURG

## 2023-11-13 RX ORDER — PHENYLEPHRINE HCL IN 0.9% NACL 1 MG/10 ML
SYRINGE (ML) INTRAVENOUS AS NEEDED
Status: DISCONTINUED | OUTPATIENT
Start: 2023-11-13 | End: 2023-11-13 | Stop reason: SURG

## 2023-11-13 RX ORDER — ONDANSETRON 2 MG/ML
4 INJECTION INTRAMUSCULAR; INTRAVENOUS ONCE AS NEEDED
Status: DISCONTINUED | OUTPATIENT
Start: 2023-11-13 | End: 2023-11-13 | Stop reason: HOSPADM

## 2023-11-13 RX ORDER — LIDOCAINE HYDROCHLORIDE 10 MG/ML
INJECTION, SOLUTION EPIDURAL; INFILTRATION; INTRACAUDAL; PERINEURAL AS NEEDED
Status: DISCONTINUED | OUTPATIENT
Start: 2023-11-13 | End: 2023-11-13 | Stop reason: SURG

## 2023-11-13 RX ADMIN — Medication 100 MCG: at 12:19

## 2023-11-13 RX ADMIN — PROPOFOL INJECTABLE EMULSION 50 MG: 10 INJECTION, EMULSION INTRAVENOUS at 12:18

## 2023-11-13 RX ADMIN — Medication 100 MCG: at 12:15

## 2023-11-13 RX ADMIN — PROPOFOL INJECTABLE EMULSION 50 MG: 10 INJECTION, EMULSION INTRAVENOUS at 12:07

## 2023-11-13 RX ADMIN — PROPOFOL INJECTABLE EMULSION 100 MG: 10 INJECTION, EMULSION INTRAVENOUS at 12:05

## 2023-11-13 RX ADMIN — Medication 100 MCG: at 12:10

## 2023-11-13 RX ADMIN — SODIUM CHLORIDE: 9 INJECTION, SOLUTION INTRAVENOUS at 11:58

## 2023-11-13 RX ADMIN — LIDOCAINE HYDROCHLORIDE 50 MG: 10 INJECTION, SOLUTION EPIDURAL; INFILTRATION; INTRACAUDAL; PERINEURAL at 12:05

## 2023-11-13 RX ADMIN — PROPOFOL INJECTABLE EMULSION 50 MG: 10 INJECTION, EMULSION INTRAVENOUS at 12:24

## 2023-11-13 RX ADMIN — LIDOCAINE HYDROCHLORIDE 50 MG: 10 INJECTION, SOLUTION EPIDURAL; INFILTRATION; INTRACAUDAL; PERINEURAL at 12:07

## 2023-11-13 RX ADMIN — PROPOFOL INJECTABLE EMULSION 25 MG: 10 INJECTION, EMULSION INTRAVENOUS at 12:12

## 2023-11-13 NOTE — ANESTHESIA PREPROCEDURE EVALUATION
Anesthesia Evaluation     Patient summary reviewed and Nursing notes reviewed   NPO Solid Status: > 8 hours  NPO Liquid Status: > 2 hours           Airway   Mallampati: II  TM distance: >3 FB  Neck ROM: full  Dental    (+) edentulous    Pulmonary    (+) a smoker Current Abstained day of surgery, COPD moderate,decreased breath sounds  Cardiovascular - normal exam  Exercise tolerance: poor (<4 METS)    ECG reviewed    (+) hypertension well controlled, CAD, dysrhythmias PVC, CHF Systolic <55%, hyperlipidemia      Neuro/Psych  (+) headaches, numbness  GI/Hepatic/Renal/Endo    (+) obesity, GERD well controlled, liver disease fatty liver disease, renal disease- CRI, diabetes mellitus type 2 well controlled, thyroid problem hypothyroidism    Musculoskeletal     (+) myalgias, neck pain  Abdominal   (+) obese   Substance History      OB/GYN          Other   arthritis,   Chronic steroid use: 8/20.  ROS/Med Hx Other: 8/20  · Left ventricular systolic function is normal.  · Estimated EF appears to be in the range of 66 - 70%.  · Left ventricular wall thickness is consistent with mild concentric hypertrophy.  · Left ventricular diastolic dysfunction (grade I) consistent with impaired relaxation                      Anesthesia Plan    ASA 3     general     intravenous induction     Anesthetic plan, risks, benefits, and alternatives have been provided, discussed and informed consent has been obtained with: patient.    Plan discussed with CRNA.        CODE STATUS:

## 2023-11-13 NOTE — OP NOTE
ESOPHAGOGASTRODUODENOSCOPY, COLONOSCOPY Procedure Report    Patient Name:  Marlena Avilez  YOB: 1955    Date of Surgery:  11/13/2023     Pre-Op Diagnosis:  Personal history of colonic polyps [Z86.010]  Abdominal pain [R10.9]  Melena [K92.1]  Dysphagia [R13.10]       Post-Op Diagnosis:  Post-Op Diagnosis Codes:     * Personal history of colonic polyps [Z86.010]     * Abdominal pain [R10.9]     * Melena [K92.1]     * Dysphagia [R13.10]  Normal esophagus dilated 60 [place] Chapin without resistance or trauma  5 mm linear nonbleeding ulcer in the antrum with adjacent antritis biopsied  7 colon polyps removed via cold snare polypectomy the largest was 7 mm in the sigmoid colon  Otherwise normal colonoscopy to the cecum with a good prep after using flushing    Procedure/CPT® Codes:      Procedure(s):  ESOPHAGOGASTRODUODENOSCOPY WITH BIPSY X1 AREA  COLONOSCOPY WITH POLYPECTOMY X7    Staff:  Surgeon(s):  Kumar Galvez MD         Anesthesia: Monitored Anesthesia Care    Implants:    Nothing was implanted during the procedure    Specimen:        See Below    Estimated blood loss: none    Complications:  None    Description of Procedure:  Description of Procedure:  A description of the procedure as well as risks, benefits and alternative methods were explained to the patient who voiced understanding and signed the corresponding consent form. A physical exam was performed and vital signs were monitored throughout the procedure.    An upper GI endoscope was placed into the mouth and proceeded through the esophagus, stomach and second portion of the duodenum without difficulty. The scope was then retroflexed and the fundus was visualized. The procedure was not difficult and there were no immediate complications.    A rectal exam was performed which was normal. An Olympus colonoscope was placed into the rectum and proceeded under direct visualization through the colon until the cecum and appendiceal orifice were  identified. Careful visualization occurred upon slow withdraw of the scope. The scope was then retroflexed and the distal rectum was visualized. The quality of the prep was good. The procedure was not difficult and there were no immediate complications.      Findings:   Normal esophagus dilated 60 [place]is[place] Chapin without resistance or trauma  5 mm linear nonbleeding ulcer in the antrum with adjacent antritis biopsied  7 sessile colon polyps (1 cecal, 2 ascending, 3 descending and 1 sigmoid) removed via cold snare polypectomy the largest was 7 mm in the sigmoid colon  Otherwise normal colonoscopy to the cecum with a good prep after using flushing    Impression:  Dysphagia status post dilation which is helped in the past  Recent episode of melena and some epigastric pain likely due to antral ulcer  Personal history of polyps with 7 removed today    Recommendations:  Follow-up biopsy results  Repeat colonoscopy in 3 years  Continue omeprazole  Avoid NSAIDs      Kumar Galvez MD     Date: 11/13/2023  Time: 12:47 EST

## 2023-11-13 NOTE — ANESTHESIA POSTPROCEDURE EVALUATION
Patient: Marlena Avilez    Procedure Summary       Date: 11/13/23 Room / Location: [facility] ENDOSCOPY 1 / [facility]COPY    Anesthesia Start: 1158 Anesthesia Stop: 1243    Procedures:       ESOPHAGOGASTRODUODENOSCOPY WITH BIPSY X1 AREA      COLONOSCOPY WITH POLYPECTOMY X7 Diagnosis:       Personal history of colonic polyps      Abdominal pain      Melena      Dysphagia      (Personal history of colonic polyps [Z86.010])      (Abdominal pain [R10.9])      (Melena [K92.1])      (Dysphagia [R13.10])    Surgeons: Kumar Galvez MD Provider: Ta Harris MD    Anesthesia Type: general ASA Status: 3            Anesthesia Type: general    Vitals  Vitals Value Taken Time   BP 98/44 11/13/23 1251   Temp     Pulse 76 11/13/23 1259   Resp 16 11/13/23 1244   SpO2 97 % 11/13/23 1259   Vitals shown include unfiled device data.        Post Anesthesia Care and Evaluation    Patient location during evaluation: PACU  Patient participation: complete - patient participated  Level of consciousness: awake  Pain score: 2 (See nurse's notes for pain score)  Pain management: adequate    Airway patency: patent  Anesthetic complications: No anesthetic complications  PONV Status: none  Cardiovascular status: acceptable  Respiratory status: acceptable and spontaneous ventilation  Hydration status: acceptable    Comments: Patient seen and examined postoperatively; vital signs stable; SpO2 greater than or equal to 90%; cardiopulmonary status stable; nausea/vomiting adequately controlled; pain adequately controlled; no apparent anesthesia complications; patient discharged from anesthesia care when discharge criteria were met

## 2023-11-13 NOTE — H&P
"GI CONSULT  NOTE:    Referring Provider:    Dottie Nunez APRN  [unfilled]    Chief complaint: <principal problem not specified>    Subjective .  Dysphagia, melena and personal history of colon polyps    History of present illness:      Marlena Avilez is a 68 y.o. female who presents today for Procedure(s):  ESOPHAGOGASTRODUODENOSCOPY  COLONOSCOPY for the indications listed below.     The updated Patient Profile was reviewed prior to the procedure, in conjunction with the Physical Exam, including medical conditions, surgical procedures, medications, allergies, family history and social history.     Pre-operatively, I reviewed the indication(s) for the procedure, the risks of the procedure [including but not limited to: unexpected bleeding possibly requiring hospitalization and/or unplanned repeat procedures, perforation possibly requiring surgical treatment, missed lesions and complications of sedation/MAC (also explained by anesthesia staff)].     I have evaluated the patient for risks associated with the planned anesthesia and the procedure to be performed and find the patient an acceptable candidate for IV sedation.    Multiple opportunities were provided for any questions or concerns, and all questions were answered satisfactorily before any anesthesia was administered. We will proceed with the planned procedure.    Past Medical History:  Past Medical History:   Diagnosis Date    Anemia     Anesthesia complication     states had chest tightness and SOA with \"gas\" anesthesia    Arthritis     Atrial fibrillation     Proxysmal    CHF (congestive heart failure)     Chronic kidney disease     Constipation     COPD (chronic obstructive pulmonary disease)     Coronary artery disease     Dark stools     Depression     Diabetic neuropathy     GERD (gastroesophageal reflux disease)     History of hernia repair     Hyperlipidemia     Hypertension     Hypothyroid     IBS (irritable bowel syndrome)     Liver " disease     Low back pain     Nausea & vomiting     Obesity     Panic attacks     RLS (restless legs syndrome)     Seizure disorder     Type 2 diabetes mellitus     Vitamin D deficiency     Vitamin D deficiency     Weight gain        Past Surgical History:  Past Surgical History:   Procedure Laterality Date    BACK SURGERY      x 4    CARDIAC CATHETERIZATION      [facility]? [facility], 3-16    CHOLECYSTECTOMY      COLONOSCOPY      ENDOSCOPY  03/31/2015    Normal    HERNIA REPAIR  01/19/2016    Dr Mota    HYSTERECTOMY      TONSILLECTOMY         Social History:  Social History     Tobacco Use    Smoking status: Every Day     Packs/day: 2.00     Years: 50.00     Additional pack years: 0.00     Total pack years: 100.00     Types: Cigarettes     Start date: 1973    Smokeless tobacco: Never    Tobacco comments:     Passive Smoke: N   Vaping Use    Vaping Use: Never used   Substance Use Topics    Alcohol use: No    Drug use: Never     Comment: no cbd       Family History:  Family History   Problem Relation Age of Onset    Cancer Mother         Other Cancer    Heart disease Mother     Hypertension Mother     Stroke Mother     Hypertension Father     Kidney disease Father     Diabetes Paternal Uncle     Cancer Paternal Uncle         Colon Cancer       Medications:  Medications Prior to Admission   Medication Sig Dispense Refill Last Dose    albuterol sulfate  (90 Base) MCG/ACT inhaler albuterol sulfate HFA 90 mcg/actuation aerosol inhaler       benzonatate (TESSALON) 100 MG capsule Every 8 (Eight) Hours.       buPROPion XL (WELLBUTRIN XL) 150 MG 24 hr tablet Take 1 tablet by mouth.       Cariprazine HCl (Vraylar) 1.5 MG capsule capsule Vraylar 1.5 mg capsule       cetirizine (zyrTEC) 10 MG tablet        ciprofloxacin (CIPRO) 500 MG tablet        clindamycin (CLEOCIN) 2 % vaginal cream clindamycin 2 % vaginal cream   PLACE 1 APPLICATORFUL VAGINALLY NIGHTLY FOR 7 DAYS.       diazePAM (VALIUM) 5  MG tablet diazepam 5 mg tablet   TAKE ONE TABLET BY MOUTH ONE HOUR BEFORE PROCEDURE AND TAKE ONE TABLET 30 MINUTES BEFORE PROCEDURE AS NEEDED FOR ANXIETY FOR MRI       dicyclomine (BENTYL) 10 MG capsule Take 1 capsule by mouth.       doxycycline (ADOXA) 100 MG tablet doxycycline monohydrate 100 mg tablet       doxycycline (VIBRAMYCIN) 100 MG capsule doxycycline hyclate 100 mg capsule   TAKE ONE CAPSULE BY MOUTH TWICE DAILY FOR 7 DAYS       DULoxetine (CYMBALTA) 30 MG capsule duloxetine 30 mg capsule,delayed release       ezetimibe (ZETIA) 10 MG tablet Take 1 tablet by mouth Daily.       fluconazole (DIFLUCAN) 150 MG tablet fluconazole 150 mg tablet       fluticasone (FLONASE) 50 MCG/ACT nasal spray fluticasone propionate 50 mcg/actuation nasal spray,suspension   USE TWO SPRAYS INTO EACH NOSTRIL DAILY       fluticasone-salmeterol (ADVAIR) 250-50 MCG/DOSE DISKUS Inhale 1 puff 2 (Two) Times a Day.       furosemide (LASIX) 40 MG tablet Take 1 tablet by mouth 2 (Two) Times a Day.       gabapentin (NEURONTIN) 300 MG capsule gabapentin 300 mg capsule       HYDROcodone-acetaminophen (NORCO) 7.5-325 MG per tablet Take 1 tablet by mouth Every 6 (Six) Hours As Needed for Moderate Pain.       hydrOXYzine (ATARAX) 25 MG tablet Take 1 tablet by mouth.       hydrOXYzine pamoate (VISTARIL) 25 MG capsule hydroxyzine pamoate 25 mg capsule   TAKE 1 CAPSULE BY MOUTH THREE TIMES DAILY AS NEEDED FOR ANXIETY (BOTTLE AS NEEDED )       icosapent ethyl (VASCEPA) 1 g capsule capsule Every 12 (Twelve) Hours.       insulin degludec (TRESIBA FLEXTOUCH) 100 UNIT/ML solution pen-injector injection Inject 50 Units under the skin into the appropriate area as directed Daily.       insulin lispro (humaLOG) 100 UNIT/ML injection Inject 6 Units under the skin into the appropriate area as directed 3 (Three) Times a Day Before Meals.       ipratropium-albuterol (DUO-NEB) 0.5-2.5 mg/3 ml nebulizer ipratropium 0.5 mg-albuterol 3 mg (2.5 mg base)/3 mL  nebulization soln       isosorbide mononitrate (IMDUR) 30 MG 24 hr tablet        levocetirizine (XYZAL) 5 MG tablet levocetirizine 5 mg tablet       levothyroxine (SYNTHROID, LEVOTHROID) 50 MCG tablet Take 1 tablet by mouth Daily.       linaclotide (Linzess) 290 MCG capsule capsule Take 1 capsule by mouth.       lisinopril (PRINIVIL,ZESTRIL) 2.5 MG tablet Daily.       lubiprostone (AMITIZA) 24 MCG capsule TAKE 1 CAPSULE BY MOUTH TWICE DAILY WITH MEALS 60 capsule 10     Lumateperone Tosylate (Caplyta) 42 MG capsule Caplyta 42 mg capsule       metFORMIN (GLUCOPHAGE) 500 MG tablet Every 12 (Twelve) Hours.       metFORMIN ER (GLUCOPHAGE-XR) 500 MG 24 hr tablet metformin  mg tablet,extended release 24 hr       methocarbamol (ROBAXIN) 750 MG tablet methocarbamol 750 mg tablet       metoprolol tartrate (LOPRESSOR) 25 MG tablet Take 1 tablet by mouth 2 (Two) Times a Day.       metroNIDAZOLE (FLAGYL) 500 MG tablet metronidazole 500 mg tablet       mometasone (NASONEX) 50 MCG/ACT nasal spray 2 sprays into the nostril(s) as directed by provider Daily.       montelukast (SINGULAIR) 10 MG tablet Take 1 tablet by mouth Every Night.       Movantik 25 MG tablet        MULTIPLE VITAMINS ESSENTIAL PO Take 1 tablet by mouth Daily.       mupirocin (BACTROBAN) 2 % ointment mupirocin 2 % topical ointment   APPLY SMALL AMOUNT INSIDE EACH NOSTRIL TWICE DAILY FOR THE 5 DAYS PRIOR TO SURGERY..       nitrofurantoin, macrocrystal-monohydrate, (MACROBID) 100 MG capsule nitrofurantoin monohydrate/macrocrystals 100 mg capsule       nitroglycerin (NITROSTAT) 0.4 MG SL tablet Place 1 tablet under the tongue Every 5 (Five) Minutes As Needed for Chest Pain. Take no more than 3 doses in 15 minutes. 30 tablet 5     nystatin (MYCOSTATIN) 100,000 unit/mL suspension nystatin 100,000 unit/mL oral suspension       OLANZapine (zyPREXA) 10 MG tablet Take 1 tablet by mouth.       omeprazole (priLOSEC) 40 MG capsule Take 1 capsule by mouth Daily.        oxyCODONE (ROXICODONE) 15 MG immediate release tablet oxycodone 15 mg tablet   TAKE 1 TABLET BY MOUTH EVERY 6 HOURS AS NEEDED FOR MODERATE PAIN       oxyCODONE-acetaminophen (PERCOCET)  MG per tablet oxycodone-acetaminophen 10 mg-325 mg tablet   TAKE ONE TABLET BY MOUTH EVERY SIX HOURS AS NEEDED FOR MODERATE PAIN       pantoprazole (PROTONIX) 40 MG EC tablet Take 1 tablet by mouth Daily.       PEG-KCl-NaCl-NaSulf-Na Asc-C (MOVIPREP) 100 g reconstituted solution powder [id number] 100 gram-sod sulf 7.5 gram-NaCl-KCl-ascorbate-C oral pwdr pack       polyethylene glycol (MIRALAX) 17 GM/SCOOP powder Take 17 g by mouth.       potassium chloride (K-DUR,KLOR-CON) 20 MEQ CR tablet Take 1 tablet by mouth Daily. 90 tablet 3     pravastatin (PRAVACHOL) 40 MG tablet Take 1 tablet by mouth Every Night.       pravastatin (PRAVACHOL) 80 MG tablet        pregabalin (LYRICA) 100 MG capsule pregabalin 100 mg capsule       pregabalin (LYRICA) 150 MG capsule pregabalin 150 mg capsule       pregabalin (LYRICA) 200 MG capsule Take 1 capsule by mouth 2 (Two) Times a Day.       promethazine (PHENERGAN) 25 MG tablet Take 1 tablet by mouth Every 6 (Six) Hours As Needed. for nausea       promethazine-dextromethorphan (PROMETHAZINE-DM) 6.25-15 MG/5ML syrup 5 mL Every 12 (Twelve) Hours.       QUEtiapine (SEROquel) 25 MG tablet quetiapine 25 mg tablet       QUEtiapine fumarate ER (SEROquel XR) 50 MG tablet sustained-release 24 hour tablet Take 1 tablet by mouth.       QUEtiapine XR (SEROquel XR) 150 MG 24 hr tablet quetiapine  mg tablet,extended release 24 hr       Rimegepant Sulfate (Nurtec) 75 MG tablet dispersible tablet Nurtec ODT 75 mg disintegrating tablet       rOPINIRole (REQUIP) 0.5 MG tablet        tamsulosin (FLOMAX) 0.4 MG capsule 24 hr capsule Take 1 capsule by mouth Daily. 30 capsule 11     temazepam (RESTORIL) 7.5 MG capsule        theophylline (THEODUR) 300 MG 12 hr tablet Take 1 tablet by mouth Daily.       tiZANidine  (ZANAFLEX) 4 MG tablet Take 1.5 tablets by mouth Every 8 (Eight) Hours As Needed for Muscle Spasms. 135 tablet 11     topiramate (TOPAMAX) 25 MG tablet topiramate 25 mg tablet       Tradjenta 5 MG tablet tablet Take 1 tablet by mouth Daily.       traZODone (DESYREL) 100 MG tablet Take 1 tablet by mouth.       triamcinolone (KENALOG) 0.1 % cream        Unifine Pentips 32G X 4 MM misc USE 3 TIMES DAILY WITH INSULIN INJECTIONS DX E11.65       venlafaxine 225 MG tablet sustained-release 24 hour 24 hr tablet venlafaxine  mg tablet,extended release 24 hr       venlafaxine XR (Effexor XR) 75 MG 24 hr capsule Take 1 capsule by mouth Daily. 30 capsule 0     venlafaxine XR (EFFEXOR-XR) 150 MG 24 hr capsule        vitamin D (ERGOCALCIFEROL) 1.25 MG (29072 UT) capsule capsule Take 1 capsule by mouth Every 30 (Thirty) Days.       ziprasidone (GEODON) 40 MG capsule ziprasidone 40 mg capsule          Scheduled Meds:  Continuous Infusions:No current facility-administered medications for this encounter.    PRN Meds:.    ALLERGIES:  Adhesive tape, Contrast dye (echo or unknown ct/mr), Iodinated contrast media, Latex, Penicillins, Statins, and Sulfa antibiotics    ROS:  The following systems were reviewed and negative;   Constitution:  No fevers, chills, no unintentional weight loss  Skin: no rash, no jaundice  Eyes:  No blurry vision, no eye pain  HENT:  No change in hearing or smell  Resp:  No dyspnea or cough  CV:  No chest pain or palpitations  :  No dysuria, hematuria  Musculoskeletal:  No leg cramps or arthralgias  Neuro:  No tremor, no numbness  Psych:  No depression or confsuion    Objective     Vital Signs:   There were no vitals filed for this visit.    Physical Exam:       General Appearance:    Awake and alert, in no acute distress   Head:    Normocephalic, without obvious abnormality, atraumatic   Throat:   No oral lesions, no thrush, oral mucosa moist   Lungs:     respirations regular, even and unlabored   Skin:    No rash, no jaundice       Results Review:  Lab Results (last 24 hours)       Procedure Component Value Units Date/Time    POC Glucose Once [[id number]]  (Normal) Collected: 11/13/23 1044    Specimen: Blood Updated: 11/13/23 1046     Glucose 86 mg/dL      Comment: Serial Number: [id number]:  [id number]               Imaging Results (Last 24 Hours)       ** No results found for the last 24 hours. **             I reviewed the patient's labs and imaging.    ASSESSMENT AND PLAN:  Dysphagia, melena and personal history of colon polyps    Active Problems:    * No active hospital problems. *       Procedure(s):  ESOPHAGOGASTRODUODENOSCOPY  COLONOSCOPY      I discussed the patients findings and my recommendations with the patient.    Kumar Galvez MD  11/13/23  10:52 EST

## 2023-11-13 NOTE — DISCHARGE INSTRUCTIONS
A responsible adult should stay with you and you should rest quietly for the rest of the day.    Do not drink alcohol, drive, operate any heavy machinery or power tools or make any legal/important decisions for the next 24 hours.     Progress your diet as tolerated.  If you begin to experience severe pain, increased shortness of breath, racing heartbeat or a fever above 101 F, seek immediate medical attention.     Follow up with MD as instructed. Call office for results in 3 to 5 days if needed.    [phone]    Impression:  Dysphagia status post dilation which is helped in the past  Recent episode of melena and some epigastric pain likely due to antral ulcer  Personal history of polyps with 7 removed today     Recommendations:  Follow-up biopsy results  Repeat colonoscopy in 3 years  Continue omeprazole  Avoid NSAIDs

## 2023-11-14 LAB
LAB AP CASE REPORT: NORMAL
PATH REPORT.FINAL DX SPEC: NORMAL
PATH REPORT.GROSS SPEC: NORMAL

## 2024-01-30 ENCOUNTER — TELEPHONE (OUTPATIENT)
Dept: CARDIOLOGY | Facility: CLINIC | Age: 69
End: 2024-01-30
Payer: MEDICARE

## 2024-01-30 DIAGNOSIS — R07.9 CHEST PAIN IN ADULT: Primary | ICD-10-CM

## 2024-01-30 DIAGNOSIS — I25.10 NONOBSTRUCTIVE ATHEROSCLEROSIS OF CORONARY ARTERY: ICD-10-CM

## 2024-01-30 DIAGNOSIS — E78.5 DYSLIPIDEMIA: ICD-10-CM

## 2024-01-30 DIAGNOSIS — I10 PRIMARY HYPERTENSION: ICD-10-CM

## 2024-01-30 NOTE — TELEPHONE ENCOUNTER
Caller: [facility]    Relationship to patient: Provider    Best call back number: [phone]      Type of visit: FOLLOW UP     Requested date: WHEN ADVISED          Additional notes:PATIENT MISSSED AN APPOINTMENT ON  8/1/23

## 2024-10-02 ENCOUNTER — OFFICE (AMBULATORY)
Age: 69
End: 2024-10-02
Payer: MEDICAID

## 2024-10-02 ENCOUNTER — OFFICE (AMBULATORY)
Dept: URBAN - METROPOLITAN AREA CLINIC 64 | Facility: CLINIC | Age: 69
End: 2024-10-02
Payer: MEDICAID

## 2024-10-02 VITALS
HEIGHT: 63 IN | WEIGHT: 181 LBS | WEIGHT: 181 LBS | HEIGHT: 63 IN | HEIGHT: 63 IN | DIASTOLIC BLOOD PRESSURE: 76 MMHG | WEIGHT: 181 LBS | HEIGHT: 63 IN | SYSTOLIC BLOOD PRESSURE: 135 MMHG | DIASTOLIC BLOOD PRESSURE: 76 MMHG | HEART RATE: 76 BPM | SYSTOLIC BLOOD PRESSURE: 135 MMHG | WEIGHT: 181 LBS | HEART RATE: 76 BPM | HEART RATE: 76 BPM | DIASTOLIC BLOOD PRESSURE: 76 MMHG | DIASTOLIC BLOOD PRESSURE: 76 MMHG | SYSTOLIC BLOOD PRESSURE: 135 MMHG | HEART RATE: 76 BPM | SYSTOLIC BLOOD PRESSURE: 135 MMHG | WEIGHT: 181 LBS | HEART RATE: 76 BPM | HEIGHT: 63 IN | WEIGHT: 181 LBS | HEIGHT: 63 IN | HEART RATE: 76 BPM | SYSTOLIC BLOOD PRESSURE: 135 MMHG | SYSTOLIC BLOOD PRESSURE: 135 MMHG | DIASTOLIC BLOOD PRESSURE: 76 MMHG | HEART RATE: 76 BPM | HEIGHT: 63 IN | SYSTOLIC BLOOD PRESSURE: 135 MMHG | WEIGHT: 181 LBS | DIASTOLIC BLOOD PRESSURE: 76 MMHG | DIASTOLIC BLOOD PRESSURE: 76 MMHG

## 2024-10-02 PROCEDURE — 99212 OFFICE O/P EST SF 10 MIN: CPT | Performed by: NURSE PRACTITIONER

## (undated) DEVICE — BITEBLOCK ENDO W/STRAP 60F A/ LF DISP

## (undated) DEVICE — SINGLE-USE BIOPSY FORCEPS: Brand: RADIAL JAW 4

## (undated) DEVICE — PK ENDO GI 50